# Patient Record
Sex: FEMALE | Race: OTHER | HISPANIC OR LATINO | URBAN - METROPOLITAN AREA
[De-identification: names, ages, dates, MRNs, and addresses within clinical notes are randomized per-mention and may not be internally consistent; named-entity substitution may affect disease eponyms.]

---

## 2017-07-14 ENCOUNTER — EMERGENCY (EMERGENCY)
Facility: HOSPITAL | Age: 27
LOS: 1 days | Discharge: HOME | End: 2017-07-14
Attending: EMERGENCY MEDICINE
Payer: MEDICAID

## 2017-07-14 VITALS
HEART RATE: 71 BPM | RESPIRATION RATE: 16 BRPM | SYSTOLIC BLOOD PRESSURE: 103 MMHG | TEMPERATURE: 98 F | WEIGHT: 128.97 LBS | DIASTOLIC BLOOD PRESSURE: 62 MMHG | HEIGHT: 58 IN | OXYGEN SATURATION: 99 %

## 2017-07-14 DIAGNOSIS — Z87.42 PERSONAL HISTORY OF OTHER DISEASES OF THE FEMALE GENITAL TRACT: Chronic | ICD-10-CM

## 2017-07-14 DIAGNOSIS — Z98.89 OTHER SPECIFIED POSTPROCEDURAL STATES: Chronic | ICD-10-CM

## 2017-07-14 DIAGNOSIS — N12 TUBULO-INTERSTITIAL NEPHRITIS, NOT SPECIFIED AS ACUTE OR CHRONIC: ICD-10-CM

## 2017-07-14 DIAGNOSIS — Z79.3 LONG TERM (CURRENT) USE OF HORMONAL CONTRACEPTIVES: ICD-10-CM

## 2017-07-14 DIAGNOSIS — Z91.018 ALLERGY TO OTHER FOODS: ICD-10-CM

## 2017-07-14 DIAGNOSIS — J45.909 UNSPECIFIED ASTHMA, UNCOMPLICATED: ICD-10-CM

## 2017-07-14 DIAGNOSIS — E28.2 POLYCYSTIC OVARIAN SYNDROME: ICD-10-CM

## 2017-07-14 LAB
ALBUMIN SERPL ELPH-MCNC: 3.7 G/DL — SIGNIFICANT CHANGE UP (ref 3.5–5)
ALP SERPL-CCNC: 41 U/L — SIGNIFICANT CHANGE UP (ref 40–120)
ALT FLD-CCNC: 25 U/L DA — SIGNIFICANT CHANGE UP (ref 10–60)
ANION GAP SERPL CALC-SCNC: 5 MMOL/L — SIGNIFICANT CHANGE UP (ref 5–17)
APPEARANCE UR: CLEAR — SIGNIFICANT CHANGE UP
AST SERPL-CCNC: 13 U/L — SIGNIFICANT CHANGE UP (ref 10–40)
BASOPHILS # BLD AUTO: 0.1 K/UL — SIGNIFICANT CHANGE UP (ref 0–0.2)
BASOPHILS NFR BLD AUTO: 1.1 % — SIGNIFICANT CHANGE UP (ref 0–2)
BILIRUB SERPL-MCNC: 0.4 MG/DL — SIGNIFICANT CHANGE UP (ref 0.2–1.2)
BILIRUB UR-MCNC: ABNORMAL
BUN SERPL-MCNC: 12 MG/DL — SIGNIFICANT CHANGE UP (ref 7–18)
CALCIUM SERPL-MCNC: 8.8 MG/DL — SIGNIFICANT CHANGE UP (ref 8.4–10.5)
CHLORIDE SERPL-SCNC: 107 MMOL/L — SIGNIFICANT CHANGE UP (ref 96–108)
CO2 SERPL-SCNC: 26 MMOL/L — SIGNIFICANT CHANGE UP (ref 22–31)
COLOR SPEC: YELLOW — SIGNIFICANT CHANGE UP
CREAT SERPL-MCNC: 0.63 MG/DL — SIGNIFICANT CHANGE UP (ref 0.5–1.3)
DIFF PNL FLD: ABNORMAL
EOSINOPHIL # BLD AUTO: 0.2 K/UL — SIGNIFICANT CHANGE UP (ref 0–0.5)
EOSINOPHIL NFR BLD AUTO: 2.6 % — SIGNIFICANT CHANGE UP (ref 0–6)
GLUCOSE SERPL-MCNC: 94 MG/DL — SIGNIFICANT CHANGE UP (ref 70–99)
GLUCOSE UR QL: NEGATIVE — SIGNIFICANT CHANGE UP
HCG UR QL: NEGATIVE — SIGNIFICANT CHANGE UP
HCT VFR BLD CALC: 40.6 % — SIGNIFICANT CHANGE UP (ref 34.5–45)
HGB BLD-MCNC: 13.6 G/DL — SIGNIFICANT CHANGE UP (ref 11.5–15.5)
KETONES UR-MCNC: NEGATIVE — SIGNIFICANT CHANGE UP
LEUKOCYTE ESTERASE UR-ACNC: ABNORMAL
LYMPHOCYTES # BLD AUTO: 1.4 K/UL — SIGNIFICANT CHANGE UP (ref 1–3.3)
LYMPHOCYTES # BLD AUTO: 22.7 % — SIGNIFICANT CHANGE UP (ref 13–44)
MCHC RBC-ENTMCNC: 32.9 PG — SIGNIFICANT CHANGE UP (ref 27–34)
MCHC RBC-ENTMCNC: 33.4 GM/DL — SIGNIFICANT CHANGE UP (ref 32–36)
MCV RBC AUTO: 98.5 FL — SIGNIFICANT CHANGE UP (ref 80–100)
MONOCYTES # BLD AUTO: 0.4 K/UL — SIGNIFICANT CHANGE UP (ref 0–0.9)
MONOCYTES NFR BLD AUTO: 7 % — SIGNIFICANT CHANGE UP (ref 2–14)
NEUTROPHILS # BLD AUTO: 4.2 K/UL — SIGNIFICANT CHANGE UP (ref 1.8–7.4)
NEUTROPHILS NFR BLD AUTO: 66.7 % — SIGNIFICANT CHANGE UP (ref 43–77)
NITRITE UR-MCNC: POSITIVE
PH UR: 6 — SIGNIFICANT CHANGE UP (ref 5–8)
PLATELET # BLD AUTO: 240 K/UL — SIGNIFICANT CHANGE UP (ref 150–400)
POTASSIUM SERPL-MCNC: 4.4 MMOL/L — SIGNIFICANT CHANGE UP (ref 3.5–5.3)
POTASSIUM SERPL-SCNC: 4.4 MMOL/L — SIGNIFICANT CHANGE UP (ref 3.5–5.3)
PROT SERPL-MCNC: 8 G/DL — SIGNIFICANT CHANGE UP (ref 6–8.3)
PROT UR-MCNC: 15
RBC # BLD: 4.12 M/UL — SIGNIFICANT CHANGE UP (ref 3.8–5.2)
RBC # FLD: 11.2 % — SIGNIFICANT CHANGE UP (ref 10.3–14.5)
SODIUM SERPL-SCNC: 138 MMOL/L — SIGNIFICANT CHANGE UP (ref 135–145)
SP GR SPEC: 1.01 — SIGNIFICANT CHANGE UP (ref 1.01–1.02)
UROBILINOGEN FLD QL: 4
WBC # BLD: 6.3 K/UL — SIGNIFICANT CHANGE UP (ref 3.8–10.5)
WBC # FLD AUTO: 6.3 K/UL — SIGNIFICANT CHANGE UP (ref 3.8–10.5)

## 2017-07-14 PROCEDURE — 80053 COMPREHEN METABOLIC PANEL: CPT

## 2017-07-14 PROCEDURE — 85027 COMPLETE CBC AUTOMATED: CPT

## 2017-07-14 PROCEDURE — 81001 URINALYSIS AUTO W/SCOPE: CPT

## 2017-07-14 PROCEDURE — 99284 EMERGENCY DEPT VISIT MOD MDM: CPT

## 2017-07-14 PROCEDURE — 96374 THER/PROPH/DIAG INJ IV PUSH: CPT

## 2017-07-14 PROCEDURE — 99284 EMERGENCY DEPT VISIT MOD MDM: CPT | Mod: 25

## 2017-07-14 PROCEDURE — 81025 URINE PREGNANCY TEST: CPT

## 2017-07-14 PROCEDURE — 96375 TX/PRO/DX INJ NEW DRUG ADDON: CPT

## 2017-07-14 PROCEDURE — 87086 URINE CULTURE/COLONY COUNT: CPT

## 2017-07-14 RX ORDER — SODIUM CHLORIDE 9 MG/ML
1000 INJECTION INTRAMUSCULAR; INTRAVENOUS; SUBCUTANEOUS ONCE
Qty: 0 | Refills: 0 | Status: COMPLETED | OUTPATIENT
Start: 2017-07-14 | End: 2017-07-14

## 2017-07-14 RX ORDER — CEFOXITIN 1 G/1
1 INJECTION, POWDER, FOR SOLUTION INTRAVENOUS
Qty: 20 | Refills: 0 | OUTPATIENT
Start: 2017-07-14 | End: 2017-07-24

## 2017-07-14 RX ORDER — KETOROLAC TROMETHAMINE 30 MG/ML
15 SYRINGE (ML) INJECTION ONCE
Qty: 0 | Refills: 0 | Status: DISCONTINUED | OUTPATIENT
Start: 2017-07-14 | End: 2017-07-14

## 2017-07-14 RX ORDER — CEFTRIAXONE 500 MG/1
1 INJECTION, POWDER, FOR SOLUTION INTRAMUSCULAR; INTRAVENOUS ONCE
Qty: 0 | Refills: 0 | Status: COMPLETED | OUTPATIENT
Start: 2017-07-14 | End: 2017-07-14

## 2017-07-14 RX ADMIN — Medication 15 MILLIGRAM(S): at 12:37

## 2017-07-14 RX ADMIN — Medication 15 MILLIGRAM(S): at 14:52

## 2017-07-14 RX ADMIN — CEFTRIAXONE 100 GRAM(S): 500 INJECTION, POWDER, FOR SOLUTION INTRAMUSCULAR; INTRAVENOUS at 12:37

## 2017-07-14 RX ADMIN — SODIUM CHLORIDE 1000 MILLILITER(S): 9 INJECTION INTRAMUSCULAR; INTRAVENOUS; SUBCUTANEOUS at 12:37

## 2017-07-14 NOTE — ED PROVIDER NOTE - MEDICAL DECISION MAKING DETAILS
25 y/o female c/o UTI sxs x 3 days and back pain x 1 day. Will do IV fluids, labs, UA, culture and pregnancy test, reassess.

## 2017-07-14 NOTE — ED PROVIDER NOTE - PMH
Asthma in childhood with multiple ER visits  No recent hospitalizarion. No meds  Ovarian cyst    Umbilical hernia

## 2017-07-14 NOTE — ED PROVIDER NOTE - OBJECTIVE STATEMENT
27 y/o female with a PMhx of  PCOS, on Metformin, Spironolactone and BCP, presents to ED c/o dysuria, urinary frequency and urgency x 3 days. Pt notes she was placed on Macrobid by her PMD but has had no sx relief. She is now experiencing R flank pain x last night. Pt notes she has had UTIs in the past, last was about 5 months ago, and states sxs feel similar. Pt denies any fever, chills, NVD or any other sxs at the moment.

## 2017-07-14 NOTE — ED PROVIDER NOTE - PSH
H/O hernia repair  umbilical hernia repair 2011  History of ovarian cyst  rupture- 2011. pt states she had internal bleeding and needed a transfusion.  S/P Appendectomy

## 2017-07-15 LAB
CULTURE RESULTS: NO GROWTH — SIGNIFICANT CHANGE UP
SPECIMEN SOURCE: SIGNIFICANT CHANGE UP

## 2018-01-10 ENCOUNTER — HOSPITAL ENCOUNTER (EMERGENCY)
Dept: HOSPITAL 93 - ER | Age: 28
Discharge: HOME | End: 2018-01-10
Payer: COMMERCIAL

## 2018-01-10 VITALS — BODY MASS INDEX: 25.6 KG/M2 | HEIGHT: 59 IN | WEIGHT: 127 LBS

## 2018-01-10 DIAGNOSIS — N39.0: Primary | ICD-10-CM

## 2018-01-16 ENCOUNTER — EMERGENCY (EMERGENCY)
Facility: HOSPITAL | Age: 28
LOS: 1 days | Discharge: LEFT BEFORE DISCHARGE | End: 2018-01-16
Admitting: EMERGENCY MEDICINE

## 2018-01-16 ENCOUNTER — EMERGENCY (EMERGENCY)
Facility: HOSPITAL | Age: 28
LOS: 1 days | Discharge: HOME | End: 2018-01-16
Attending: EMERGENCY MEDICINE
Payer: MEDICAID

## 2018-01-16 VITALS
SYSTOLIC BLOOD PRESSURE: 104 MMHG | RESPIRATION RATE: 16 BRPM | TEMPERATURE: 97 F | WEIGHT: 119.05 LBS | OXYGEN SATURATION: 99 % | HEIGHT: 59 IN | HEART RATE: 88 BPM | DIASTOLIC BLOOD PRESSURE: 68 MMHG

## 2018-01-16 VITALS
DIASTOLIC BLOOD PRESSURE: 68 MMHG | TEMPERATURE: 98 F | RESPIRATION RATE: 16 BRPM | SYSTOLIC BLOOD PRESSURE: 124 MMHG | HEART RATE: 91 BPM | OXYGEN SATURATION: 100 %

## 2018-01-16 DIAGNOSIS — Z87.42 PERSONAL HISTORY OF OTHER DISEASES OF THE FEMALE GENITAL TRACT: Chronic | ICD-10-CM

## 2018-01-16 DIAGNOSIS — Z98.89 OTHER SPECIFIED POSTPROCEDURAL STATES: Chronic | ICD-10-CM

## 2018-01-16 PROCEDURE — 99285 EMERGENCY DEPT VISIT HI MDM: CPT | Mod: 25

## 2018-01-16 NOTE — ED ADULT TRIAGE NOTE - CHIEF COMPLAINT QUOTE
pt c/o cramping and pressure like pelvic pain since 1pm. pt also endorses vaginal bleeding/spotting x1 hr. LMP Jan 2nd. PMH- ruptured ovarian cysts. pt states pain feels the same. PMH- Appendicitis, hernia repair x3, asthma, ovarian cysts.

## 2018-01-17 VITALS
HEART RATE: 75 BPM | SYSTOLIC BLOOD PRESSURE: 110 MMHG | RESPIRATION RATE: 16 BRPM | DIASTOLIC BLOOD PRESSURE: 70 MMHG | TEMPERATURE: 98 F | OXYGEN SATURATION: 100 %

## 2018-01-17 LAB
ALBUMIN SERPL ELPH-MCNC: 4 G/DL — SIGNIFICANT CHANGE UP (ref 3.5–5)
ALP SERPL-CCNC: 60 U/L — SIGNIFICANT CHANGE UP (ref 40–120)
ALT FLD-CCNC: 22 U/L DA — SIGNIFICANT CHANGE UP (ref 10–60)
ANION GAP SERPL CALC-SCNC: 6 MMOL/L — SIGNIFICANT CHANGE UP (ref 5–17)
APPEARANCE UR: CLEAR — SIGNIFICANT CHANGE UP
AST SERPL-CCNC: 10 U/L — SIGNIFICANT CHANGE UP (ref 10–40)
BASOPHILS # BLD AUTO: 0.1 K/UL — SIGNIFICANT CHANGE UP (ref 0–0.2)
BASOPHILS NFR BLD AUTO: 1.3 % — SIGNIFICANT CHANGE UP (ref 0–2)
BILIRUB SERPL-MCNC: 0.3 MG/DL — SIGNIFICANT CHANGE UP (ref 0.2–1.2)
BILIRUB UR-MCNC: NEGATIVE — SIGNIFICANT CHANGE UP
BUN SERPL-MCNC: 20 MG/DL — HIGH (ref 7–18)
C TRACH RRNA SPEC QL NAA+PROBE: SIGNIFICANT CHANGE UP
CALCIUM SERPL-MCNC: 8.8 MG/DL — SIGNIFICANT CHANGE UP (ref 8.4–10.5)
CHLORIDE SERPL-SCNC: 103 MMOL/L — SIGNIFICANT CHANGE UP (ref 96–108)
CO2 SERPL-SCNC: 28 MMOL/L — SIGNIFICANT CHANGE UP (ref 22–31)
COLOR SPEC: YELLOW — SIGNIFICANT CHANGE UP
CREAT SERPL-MCNC: 0.75 MG/DL — SIGNIFICANT CHANGE UP (ref 0.5–1.3)
DIFF PNL FLD: ABNORMAL
EOSINOPHIL # BLD AUTO: 0.3 K/UL — SIGNIFICANT CHANGE UP (ref 0–0.5)
EOSINOPHIL NFR BLD AUTO: 3 % — SIGNIFICANT CHANGE UP (ref 0–6)
GLUCOSE SERPL-MCNC: 98 MG/DL — SIGNIFICANT CHANGE UP (ref 70–99)
GLUCOSE UR QL: NEGATIVE — SIGNIFICANT CHANGE UP
HCG SERPL-ACNC: <1 MIU/ML — SIGNIFICANT CHANGE UP
HCG UR QL: NEGATIVE — SIGNIFICANT CHANGE UP
HCT VFR BLD CALC: 41.6 % — SIGNIFICANT CHANGE UP (ref 34.5–45)
HGB BLD-MCNC: 13.5 G/DL — SIGNIFICANT CHANGE UP (ref 11.5–15.5)
KETONES UR-MCNC: NEGATIVE — SIGNIFICANT CHANGE UP
LEUKOCYTE ESTERASE UR-ACNC: ABNORMAL
LYMPHOCYTES # BLD AUTO: 3.6 K/UL — HIGH (ref 1–3.3)
LYMPHOCYTES # BLD AUTO: 34.6 % — SIGNIFICANT CHANGE UP (ref 13–44)
MCHC RBC-ENTMCNC: 32.5 GM/DL — SIGNIFICANT CHANGE UP (ref 32–36)
MCHC RBC-ENTMCNC: 32.9 PG — SIGNIFICANT CHANGE UP (ref 27–34)
MCV RBC AUTO: 101.1 FL — HIGH (ref 80–100)
MONOCYTES # BLD AUTO: 0.9 K/UL — SIGNIFICANT CHANGE UP (ref 0–0.9)
MONOCYTES NFR BLD AUTO: 9 % — SIGNIFICANT CHANGE UP (ref 2–14)
N GONORRHOEA RRNA SPEC QL NAA+PROBE: SIGNIFICANT CHANGE UP
NEUTROPHILS # BLD AUTO: 5.4 K/UL — SIGNIFICANT CHANGE UP (ref 1.8–7.4)
NEUTROPHILS NFR BLD AUTO: 52.1 % — SIGNIFICANT CHANGE UP (ref 43–77)
NITRITE UR-MCNC: NEGATIVE — SIGNIFICANT CHANGE UP
PH UR: 5 — SIGNIFICANT CHANGE UP (ref 5–8)
PLATELET # BLD AUTO: 313 K/UL — SIGNIFICANT CHANGE UP (ref 150–400)
POTASSIUM SERPL-MCNC: 4.1 MMOL/L — SIGNIFICANT CHANGE UP (ref 3.5–5.3)
POTASSIUM SERPL-SCNC: 4.1 MMOL/L — SIGNIFICANT CHANGE UP (ref 3.5–5.3)
PROT SERPL-MCNC: 8.1 G/DL — SIGNIFICANT CHANGE UP (ref 6–8.3)
PROT UR-MCNC: 30 MG/DL
RBC # BLD: 4.12 M/UL — SIGNIFICANT CHANGE UP (ref 3.8–5.2)
RBC # FLD: 11.2 % — SIGNIFICANT CHANGE UP (ref 10.3–14.5)
SODIUM SERPL-SCNC: 137 MMOL/L — SIGNIFICANT CHANGE UP (ref 135–145)
SP GR SPEC: 1.02 — SIGNIFICANT CHANGE UP (ref 1.01–1.02)
SPECIMEN SOURCE: SIGNIFICANT CHANGE UP
UROBILINOGEN FLD QL: NEGATIVE — SIGNIFICANT CHANGE UP
WBC # BLD: 10.3 K/UL — SIGNIFICANT CHANGE UP (ref 3.8–10.5)
WBC # FLD AUTO: 10.3 K/UL — SIGNIFICANT CHANGE UP (ref 3.8–10.5)

## 2018-01-17 PROCEDURE — 76830 TRANSVAGINAL US NON-OB: CPT

## 2018-01-17 PROCEDURE — 85027 COMPLETE CBC AUTOMATED: CPT

## 2018-01-17 PROCEDURE — 76830 TRANSVAGINAL US NON-OB: CPT | Mod: 26,59

## 2018-01-17 PROCEDURE — 86850 RBC ANTIBODY SCREEN: CPT

## 2018-01-17 PROCEDURE — 96375 TX/PRO/DX INJ NEW DRUG ADDON: CPT

## 2018-01-17 PROCEDURE — 84702 CHORIONIC GONADOTROPIN TEST: CPT

## 2018-01-17 PROCEDURE — 87186 SC STD MICRODIL/AGAR DIL: CPT

## 2018-01-17 PROCEDURE — 81001 URINALYSIS AUTO W/SCOPE: CPT

## 2018-01-17 PROCEDURE — 86901 BLOOD TYPING SEROLOGIC RH(D): CPT

## 2018-01-17 PROCEDURE — 76856 US EXAM PELVIC COMPLETE: CPT | Mod: 26,59

## 2018-01-17 PROCEDURE — 76856 US EXAM PELVIC COMPLETE: CPT

## 2018-01-17 PROCEDURE — 99284 EMERGENCY DEPT VISIT MOD MDM: CPT | Mod: 25

## 2018-01-17 PROCEDURE — 87086 URINE CULTURE/COLONY COUNT: CPT

## 2018-01-17 PROCEDURE — 86900 BLOOD TYPING SEROLOGIC ABO: CPT

## 2018-01-17 PROCEDURE — 96374 THER/PROPH/DIAG INJ IV PUSH: CPT

## 2018-01-17 PROCEDURE — 81025 URINE PREGNANCY TEST: CPT

## 2018-01-17 PROCEDURE — 80053 COMPREHEN METABOLIC PANEL: CPT

## 2018-01-17 RX ORDER — KETOROLAC TROMETHAMINE 30 MG/ML
30 SYRINGE (ML) INJECTION ONCE
Qty: 0 | Refills: 0 | Status: DISCONTINUED | OUTPATIENT
Start: 2018-01-17 | End: 2018-01-17

## 2018-01-17 RX ORDER — SODIUM CHLORIDE 9 MG/ML
3 INJECTION INTRAMUSCULAR; INTRAVENOUS; SUBCUTANEOUS ONCE
Qty: 0 | Refills: 0 | Status: COMPLETED | OUTPATIENT
Start: 2018-01-17 | End: 2018-01-17

## 2018-01-17 RX ORDER — MORPHINE SULFATE 50 MG/1
2 CAPSULE, EXTENDED RELEASE ORAL ONCE
Qty: 0 | Refills: 0 | Status: DISCONTINUED | OUTPATIENT
Start: 2018-01-17 | End: 2018-01-17

## 2018-01-17 RX ADMIN — Medication 1 TABLET(S): at 04:15

## 2018-01-17 RX ADMIN — MORPHINE SULFATE 2 MILLIGRAM(S): 50 CAPSULE, EXTENDED RELEASE ORAL at 04:01

## 2018-01-17 RX ADMIN — SODIUM CHLORIDE 3 MILLILITER(S): 9 INJECTION INTRAMUSCULAR; INTRAVENOUS; SUBCUTANEOUS at 01:39

## 2018-01-17 RX ADMIN — MORPHINE SULFATE 2 MILLIGRAM(S): 50 CAPSULE, EXTENDED RELEASE ORAL at 01:46

## 2018-01-17 RX ADMIN — Medication 30 MILLIGRAM(S): at 04:15

## 2018-01-17 RX ADMIN — Medication 30 MILLIGRAM(S): at 04:35

## 2018-01-17 NOTE — ED PROVIDER NOTE - MEDICAL DECISION MAKING DETAILS
Pt with PCOS with pelvic pain and vaginal spotting x 1 day. Will get labs, UA, pelvic ultrasound to r/u torsion. Given morphine for pain and reassess.

## 2018-01-17 NOTE — ED PROVIDER NOTE - OBJECTIVE STATEMENT
26 y/o female with PMHx of PCOS and PSHx of umbilical hernia repair, appendectomy presents to the ED c/o pelvic pain x 1 day. Pt notes the pain began around 1PM today with associated vaginal spotting and dysuria. Pt notes the pain started in the pelvic area and radiated up the abd. Pt denies N/V/D, fever, or any other complaints. Pt allergic to Reglan (rash).

## 2018-01-17 NOTE — ED PROVIDER NOTE - PROGRESS NOTE DETAILS
labs show wbc 10K, UA cw UTI-given bactrim  pelvic US shows R ovarian cyst-no evidence of free fluid or torsion. Discussed above with patient. patient stable for discharge.

## 2018-02-24 ENCOUNTER — EMERGENCY (EMERGENCY)
Facility: HOSPITAL | Age: 28
LOS: 1 days | Discharge: HOME | End: 2018-02-24
Attending: EMERGENCY MEDICINE | Admitting: EMERGENCY MEDICINE
Payer: MEDICAID

## 2018-02-24 VITALS
SYSTOLIC BLOOD PRESSURE: 93 MMHG | DIASTOLIC BLOOD PRESSURE: 56 MMHG | RESPIRATION RATE: 16 BRPM | OXYGEN SATURATION: 99 % | TEMPERATURE: 99 F | HEART RATE: 78 BPM

## 2018-02-24 VITALS
HEART RATE: 74 BPM | OXYGEN SATURATION: 100 % | DIASTOLIC BLOOD PRESSURE: 53 MMHG | SYSTOLIC BLOOD PRESSURE: 101 MMHG | TEMPERATURE: 98 F | RESPIRATION RATE: 18 BRPM

## 2018-02-24 DIAGNOSIS — Z87.42 PERSONAL HISTORY OF OTHER DISEASES OF THE FEMALE GENITAL TRACT: Chronic | ICD-10-CM

## 2018-02-24 DIAGNOSIS — Z98.89 OTHER SPECIFIED POSTPROCEDURAL STATES: Chronic | ICD-10-CM

## 2018-02-24 LAB
ALBUMIN SERPL ELPH-MCNC: 3.8 G/DL — SIGNIFICANT CHANGE UP (ref 3.3–5)
ALP SERPL-CCNC: 34 U/L — LOW (ref 40–120)
ALT FLD-CCNC: 10 U/L — SIGNIFICANT CHANGE UP (ref 4–33)
APPEARANCE UR: CLEAR — SIGNIFICANT CHANGE UP
AST SERPL-CCNC: 12 U/L — SIGNIFICANT CHANGE UP (ref 4–32)
BACTERIA # UR AUTO: SIGNIFICANT CHANGE UP
BASOPHILS # BLD AUTO: 0.05 K/UL — SIGNIFICANT CHANGE UP (ref 0–0.2)
BASOPHILS NFR BLD AUTO: 0.5 % — SIGNIFICANT CHANGE UP (ref 0–2)
BILIRUB SERPL-MCNC: 0.3 MG/DL — SIGNIFICANT CHANGE UP (ref 0.2–1.2)
BILIRUB UR-MCNC: NEGATIVE — SIGNIFICANT CHANGE UP
BLD GP AB SCN SERPL QL: NEGATIVE — SIGNIFICANT CHANGE UP
BLOOD UR QL VISUAL: HIGH
BUN SERPL-MCNC: 11 MG/DL — SIGNIFICANT CHANGE UP (ref 7–23)
CALCIUM SERPL-MCNC: 8.7 MG/DL — SIGNIFICANT CHANGE UP (ref 8.4–10.5)
CHLORIDE SERPL-SCNC: 102 MMOL/L — SIGNIFICANT CHANGE UP (ref 98–107)
CO2 SERPL-SCNC: 23 MMOL/L — SIGNIFICANT CHANGE UP (ref 22–31)
COLOR SPEC: YELLOW — SIGNIFICANT CHANGE UP
CREAT SERPL-MCNC: 0.61 MG/DL — SIGNIFICANT CHANGE UP (ref 0.5–1.3)
EOSINOPHIL # BLD AUTO: 0.14 K/UL — SIGNIFICANT CHANGE UP (ref 0–0.5)
EOSINOPHIL NFR BLD AUTO: 1.3 % — SIGNIFICANT CHANGE UP (ref 0–6)
GLUCOSE SERPL-MCNC: 92 MG/DL — SIGNIFICANT CHANGE UP (ref 70–99)
GLUCOSE UR-MCNC: NEGATIVE — SIGNIFICANT CHANGE UP
HCG SERPL-ACNC: SIGNIFICANT CHANGE UP MIU/ML
HCT VFR BLD CALC: 35.4 % — SIGNIFICANT CHANGE UP (ref 34.5–45)
HGB BLD-MCNC: 12.2 G/DL — SIGNIFICANT CHANGE UP (ref 11.5–15.5)
IMM GRANULOCYTES # BLD AUTO: 0.05 # — SIGNIFICANT CHANGE UP
IMM GRANULOCYTES NFR BLD AUTO: 0.5 % — SIGNIFICANT CHANGE UP (ref 0–1.5)
KETONES UR-MCNC: NEGATIVE — SIGNIFICANT CHANGE UP
LEUKOCYTE ESTERASE UR-ACNC: HIGH
LYMPHOCYTES # BLD AUTO: 2.52 K/UL — SIGNIFICANT CHANGE UP (ref 1–3.3)
LYMPHOCYTES # BLD AUTO: 24.2 % — SIGNIFICANT CHANGE UP (ref 13–44)
MCHC RBC-ENTMCNC: 34.4 PG — HIGH (ref 27–34)
MCHC RBC-ENTMCNC: 34.5 % — SIGNIFICANT CHANGE UP (ref 32–36)
MCV RBC AUTO: 99.7 FL — SIGNIFICANT CHANGE UP (ref 80–100)
MONOCYTES # BLD AUTO: 0.88 K/UL — SIGNIFICANT CHANGE UP (ref 0–0.9)
MONOCYTES NFR BLD AUTO: 8.4 % — SIGNIFICANT CHANGE UP (ref 2–14)
MUCOUS THREADS # UR AUTO: SIGNIFICANT CHANGE UP
NEUTROPHILS # BLD AUTO: 6.78 K/UL — SIGNIFICANT CHANGE UP (ref 1.8–7.4)
NEUTROPHILS NFR BLD AUTO: 65.1 % — SIGNIFICANT CHANGE UP (ref 43–77)
NITRITE UR-MCNC: NEGATIVE — SIGNIFICANT CHANGE UP
NRBC # FLD: 0 — SIGNIFICANT CHANGE UP
PH UR: 5.5 — SIGNIFICANT CHANGE UP (ref 4.6–8)
PLATELET # BLD AUTO: 228 K/UL — SIGNIFICANT CHANGE UP (ref 150–400)
PMV BLD: 10.6 FL — SIGNIFICANT CHANGE UP (ref 7–13)
POTASSIUM SERPL-MCNC: 4.4 MMOL/L — SIGNIFICANT CHANGE UP (ref 3.5–5.3)
POTASSIUM SERPL-SCNC: 4.4 MMOL/L — SIGNIFICANT CHANGE UP (ref 3.5–5.3)
PROT SERPL-MCNC: 6.7 G/DL — SIGNIFICANT CHANGE UP (ref 6–8.3)
PROT UR-MCNC: NEGATIVE MG/DL — SIGNIFICANT CHANGE UP
RBC # BLD: 3.55 M/UL — LOW (ref 3.8–5.2)
RBC # FLD: 11.8 % — SIGNIFICANT CHANGE UP (ref 10.3–14.5)
RBC CASTS # UR COMP ASSIST: SIGNIFICANT CHANGE UP (ref 0–?)
RH IG SCN BLD-IMP: POSITIVE — SIGNIFICANT CHANGE UP
SODIUM SERPL-SCNC: 138 MMOL/L — SIGNIFICANT CHANGE UP (ref 135–145)
SP GR SPEC: 1.02 — SIGNIFICANT CHANGE UP (ref 1–1.04)
SQUAMOUS # UR AUTO: SIGNIFICANT CHANGE UP
UROBILINOGEN FLD QL: NORMAL MG/DL — SIGNIFICANT CHANGE UP
WBC # BLD: 10.42 K/UL — SIGNIFICANT CHANGE UP (ref 3.8–10.5)
WBC # FLD AUTO: 10.42 K/UL — SIGNIFICANT CHANGE UP (ref 3.8–10.5)
WBC UR QL: SIGNIFICANT CHANGE UP (ref 0–?)

## 2018-02-24 PROCEDURE — 76830 TRANSVAGINAL US NON-OB: CPT | Mod: 26

## 2018-02-24 PROCEDURE — 99285 EMERGENCY DEPT VISIT HI MDM: CPT | Mod: 25

## 2018-02-24 NOTE — ED PROVIDER NOTE - MEDICAL DECISION MAKING DETAILS
28yo female 7 weeks gestation with vaginal bleeding, LLQ pain, will check TVUS, labs, offered Tylenol but declined, reassess. Ashley Doherty DO

## 2018-02-24 NOTE — ED ADULT NURSE NOTE - CHIEF COMPLAINT
The patient is a 27y Female complaining of vaginal bleeding when wiping since this morning during flight back from [place], reports x7 weeks pregnant, 2nd pregnancy, last pregnancy resulted in spontaneous . Pt also c/o slight abdominal cramping. Denies any clots, denies saturating pads, denies dizziness/lightheadedness. Reports pmhx ovarian cyst rupture, asthma, umbilical hernia.

## 2018-02-24 NOTE — ED ADULT NURSE NOTE - OBJECTIVE STATEMENT
Received pt in room 20, ambulatory, pt A&Ox4, respirations even and unlabored b/l. Abdomen soft, nondistended, nontender. Mother at bedside. IVL 20g Angiocath placed on right AC. Labs sent. Awaiting MD barakat. Report endorsed to primary RN.

## 2018-02-24 NOTE — ED PROVIDER NOTE - OBJECTIVE STATEMENT
28yo female PMH ovarian cyst ,  ~7 weeks gestation (LMP 18) presenting with vaginal bleeding x 1 day. Patient flew home from [place] earlier today and noticed pink discharge after she landed. Later this evening she noticed dark red blood only while wiping, none in underwear. Patient states she has mild LLQ pain. No dysuria or hematuria, no nausea or vomiting, no back pain. Patient had a miscarriage prior to this pregnancy around the same time. Patient states that she had a visualized IUP earlier this week in [place].     OB/GYN: Dr. Tovar ([place]) 26yo female PMH ovarian cyst ,  ~7 weeks gestation (LMP 18) presenting with vaginal bleeding x 1 day. Patient flew home from [place] earlier today and noticed pink discharge after she landed. Later this evening she noticed dark red blood only while wiping, none in underwear. Patient states she has mild LLQ pain. No dysuria or hematuria, no nausea or vomiting, no back pain. Patient had a miscarriage prior to this pregnancy around the same time. Patient states that she had a visualized IUP earlier this week in [place]. This pregnancy was conceived naturally without aid of fertility agents, desired pregnancy.    OB/GYN: Dr. Tovar ([place])

## 2018-02-24 NOTE — ED PROVIDER NOTE - PHYSICAL EXAMINATION
Gen: NAD  Head: NCAT  Lung: CTAB, no respiratory distress, no wheezing, rales, rhonchi  CV: normal s1/s2, rrr, no murmurs, Normal perfusion  Abd: soft, mild TTP LLQ, no guarding or rigidity  MSK: No edema, no visible deformities, full range of motion in all 4 extremities  Neuro: CN II-XII grossly intact, No focal neurologic deficits  Skin: No rash   Psych: normal affect Gen: NAD  Head: NCAT  Lung: CTAB, no respiratory distress, no wheezing, rales, rhonchi  CV: normal s1/s2, rrr, no murmurs, Normal perfusion  Abd: soft, mild TTP LLQ, no guarding or rigidity  MSK: No edema, no visible deformities, full range of motion in all 4 extremities  Neuro: CN II-XII grossly intact, No focal neurologic deficits  Skin: No rash   Psych: normal affect  Pelvic: (Chaperoned by Dr. Cruz) Cervical os open, +left adnexal tenderness

## 2018-02-24 NOTE — ED ADULT NURSE NOTE - CHIEF COMPLAINT QUOTE
7 weeks pregnant and started having vaginal spotting around 4pm Yesterday but this AM she started to have more bleeding. c/o lower abdominal discomfort. LMP :1/02/18

## 2018-02-25 LAB
BACTERIA UR CULT: SIGNIFICANT CHANGE UP
SPECIMEN SOURCE: SIGNIFICANT CHANGE UP

## 2018-04-03 ENCOUNTER — EMERGENCY (EMERGENCY)
Facility: HOSPITAL | Age: 28
LOS: 1 days | Discharge: HOME | End: 2018-04-03
Attending: EMERGENCY MEDICINE
Payer: MEDICAID

## 2018-04-03 VITALS
TEMPERATURE: 98 F | HEIGHT: 59 IN | RESPIRATION RATE: 16 BRPM | HEART RATE: 76 BPM | SYSTOLIC BLOOD PRESSURE: 103 MMHG | OXYGEN SATURATION: 98 % | WEIGHT: 130.07 LBS | DIASTOLIC BLOOD PRESSURE: 78 MMHG

## 2018-04-03 DIAGNOSIS — Z87.42 PERSONAL HISTORY OF OTHER DISEASES OF THE FEMALE GENITAL TRACT: Chronic | ICD-10-CM

## 2018-04-03 DIAGNOSIS — Z98.89 OTHER SPECIFIED POSTPROCEDURAL STATES: Chronic | ICD-10-CM

## 2018-04-03 LAB
APPEARANCE UR: CLEAR — SIGNIFICANT CHANGE UP
BILIRUB UR-MCNC: NEGATIVE — SIGNIFICANT CHANGE UP
COLOR SPEC: YELLOW — SIGNIFICANT CHANGE UP
DIFF PNL FLD: NEGATIVE — SIGNIFICANT CHANGE UP
GLUCOSE UR QL: NEGATIVE — SIGNIFICANT CHANGE UP
KETONES UR-MCNC: NEGATIVE — SIGNIFICANT CHANGE UP
LEUKOCYTE ESTERASE UR-ACNC: ABNORMAL
NITRITE UR-MCNC: NEGATIVE — SIGNIFICANT CHANGE UP
PH UR: 8 — SIGNIFICANT CHANGE UP (ref 5–8)
PROT UR-MCNC: NEGATIVE — SIGNIFICANT CHANGE UP
SP GR SPEC: 1.01 — SIGNIFICANT CHANGE UP (ref 1.01–1.02)
UROBILINOGEN FLD QL: NEGATIVE — SIGNIFICANT CHANGE UP

## 2018-04-03 PROCEDURE — 99285 EMERGENCY DEPT VISIT HI MDM: CPT

## 2018-04-03 PROCEDURE — 76801 OB US < 14 WKS SINGLE FETUS: CPT

## 2018-04-03 PROCEDURE — 76817 TRANSVAGINAL US OBSTETRIC: CPT

## 2018-04-03 PROCEDURE — 81001 URINALYSIS AUTO W/SCOPE: CPT

## 2018-04-03 PROCEDURE — 99284 EMERGENCY DEPT VISIT MOD MDM: CPT | Mod: 25

## 2018-04-03 PROCEDURE — 76801 OB US < 14 WKS SINGLE FETUS: CPT | Mod: 26

## 2018-04-03 PROCEDURE — 76817 TRANSVAGINAL US OBSTETRIC: CPT | Mod: 26

## 2018-04-03 NOTE — ED PROVIDER NOTE - PMH
Asthma in childhood with multiple ER visits  No recent hospitalizarion. No meds  Endometriosis    Ovarian cyst    Peritoneal adhesions    Umbilical hernia

## 2018-04-03 NOTE — ED ADULT NURSE NOTE - OBJECTIVE STATEMENT
Patient presents to ED with severe pelvic cramping since around 5am today, pressure, patient is about 13 weeks pregnant. Denies any nausea or vomiting

## 2018-04-03 NOTE — ED PROVIDER NOTE - MEDICAL DECISION MAKING DETAILS
Patient with second trimester abdominal pain. Threatened . To f/u with gyn in 1-2 days. Bleeding precautions, pelvic rest given.

## 2018-04-03 NOTE — ED PROVIDER NOTE - OBJECTIVE STATEMENT
Patient reports severe pelvic cramping since around 5am today, pressure. No fever, ha, cp, sob,  n/v/d, dysuria, urgency, frequency, vaginal bleeding, vaginal discharge. Patient is approximately 13 weeks pregnant. Has h/o endometriosis with intraabdominal adhesions.

## 2018-08-28 NOTE — ED ADULT TRIAGE NOTE - CHIEF COMPLAINT QUOTE
7 weeks pregnant and started having vaginal spotting around 4pm Yesterday but this AM she started to have more bleeding. c/o lower abdominal discomfort. LMP )1/02/18 7 weeks pregnant and started having vaginal spotting around 4pm Yesterday but this AM she started to have more bleeding. c/o lower abdominal discomfort. LMP :1/02/18 good, to achieve stated therapy goals

## 2018-09-16 ENCOUNTER — OUTPATIENT (OUTPATIENT)
Dept: OUTPATIENT SERVICES | Facility: HOSPITAL | Age: 28
LOS: 1 days | End: 2018-09-16

## 2018-09-16 DIAGNOSIS — Z98.89 OTHER SPECIFIED POSTPROCEDURAL STATES: Chronic | ICD-10-CM

## 2018-09-16 DIAGNOSIS — O26.899 OTHER SPECIFIED PREGNANCY RELATED CONDITIONS, UNSPECIFIED TRIMESTER: ICD-10-CM

## 2018-09-16 DIAGNOSIS — Z87.42 PERSONAL HISTORY OF OTHER DISEASES OF THE FEMALE GENITAL TRACT: Chronic | ICD-10-CM

## 2018-09-16 DIAGNOSIS — Z3A.00 WEEKS OF GESTATION OF PREGNANCY NOT SPECIFIED: ICD-10-CM

## 2018-09-16 PROBLEM — N80.9 ENDOMETRIOSIS, UNSPECIFIED: Chronic | Status: ACTIVE | Noted: 2018-04-03

## 2018-10-01 ENCOUNTER — OUTPATIENT (OUTPATIENT)
Dept: OUTPATIENT SERVICES | Facility: HOSPITAL | Age: 28
LOS: 1 days | End: 2018-10-01
Payer: MEDICAID

## 2018-10-01 DIAGNOSIS — Z98.89 OTHER SPECIFIED POSTPROCEDURAL STATES: Chronic | ICD-10-CM

## 2018-10-01 DIAGNOSIS — Z87.42 PERSONAL HISTORY OF OTHER DISEASES OF THE FEMALE GENITAL TRACT: Chronic | ICD-10-CM

## 2018-10-07 ENCOUNTER — INPATIENT (INPATIENT)
Facility: HOSPITAL | Age: 28
LOS: 1 days | Discharge: HOME | End: 2018-10-09
Attending: OBSTETRICS & GYNECOLOGY | Admitting: OBSTETRICS & GYNECOLOGY
Payer: MEDICAID

## 2018-10-07 VITALS — HEIGHT: 59 IN | WEIGHT: 160.94 LBS

## 2018-10-07 DIAGNOSIS — Z87.42 PERSONAL HISTORY OF OTHER DISEASES OF THE FEMALE GENITAL TRACT: Chronic | ICD-10-CM

## 2018-10-07 DIAGNOSIS — Z3A.00 WEEKS OF GESTATION OF PREGNANCY NOT SPECIFIED: ICD-10-CM

## 2018-10-07 DIAGNOSIS — Z98.89 OTHER SPECIFIED POSTPROCEDURAL STATES: Chronic | ICD-10-CM

## 2018-10-07 DIAGNOSIS — O26.899 OTHER SPECIFIED PREGNANCY RELATED CONDITIONS, UNSPECIFIED TRIMESTER: ICD-10-CM

## 2018-10-07 LAB
BASOPHILS # BLD AUTO: 0.05 K/UL — SIGNIFICANT CHANGE UP (ref 0–0.2)
BASOPHILS NFR BLD AUTO: 0.5 % — SIGNIFICANT CHANGE UP (ref 0–2)
BLD GP AB SCN SERPL QL: NEGATIVE — SIGNIFICANT CHANGE UP
EOSINOPHIL # BLD AUTO: 0.12 K/UL — SIGNIFICANT CHANGE UP (ref 0–0.5)
EOSINOPHIL NFR BLD AUTO: 1.3 % — SIGNIFICANT CHANGE UP (ref 0–6)
HBV SURFACE AG SER-ACNC: NEGATIVE — SIGNIFICANT CHANGE UP
HCT VFR BLD CALC: 37.2 % — SIGNIFICANT CHANGE UP (ref 34.5–45)
HGB BLD-MCNC: 12.4 G/DL — SIGNIFICANT CHANGE UP (ref 11.5–15.5)
HIV COMBO RESULT: SIGNIFICANT CHANGE UP
HIV1+2 AB SPEC QL: SIGNIFICANT CHANGE UP
IMM GRANULOCYTES # BLD AUTO: 0.06 # — SIGNIFICANT CHANGE UP
IMM GRANULOCYTES NFR BLD AUTO: 0.7 % — SIGNIFICANT CHANGE UP (ref 0–1.5)
LYMPHOCYTES # BLD AUTO: 2.53 K/UL — SIGNIFICANT CHANGE UP (ref 1–3.3)
LYMPHOCYTES # BLD AUTO: 27.6 % — SIGNIFICANT CHANGE UP (ref 13–44)
MCHC RBC-ENTMCNC: 33.3 % — SIGNIFICANT CHANGE UP (ref 32–36)
MCHC RBC-ENTMCNC: 33.4 PG — SIGNIFICANT CHANGE UP (ref 27–34)
MCV RBC AUTO: 100.3 FL — HIGH (ref 80–100)
MONOCYTES # BLD AUTO: 0.87 K/UL — SIGNIFICANT CHANGE UP (ref 0–0.9)
MONOCYTES NFR BLD AUTO: 9.5 % — SIGNIFICANT CHANGE UP (ref 2–14)
NEUTROPHILS # BLD AUTO: 5.55 K/UL — SIGNIFICANT CHANGE UP (ref 1.8–7.4)
NEUTROPHILS NFR BLD AUTO: 60.4 % — SIGNIFICANT CHANGE UP (ref 43–77)
NRBC # FLD: 0 — SIGNIFICANT CHANGE UP
PLATELET # BLD AUTO: 205 K/UL — SIGNIFICANT CHANGE UP (ref 150–400)
PMV BLD: 11.8 FL — SIGNIFICANT CHANGE UP (ref 7–13)
RBC # BLD: 3.71 M/UL — LOW (ref 3.8–5.2)
RBC # FLD: 12.7 % — SIGNIFICANT CHANGE UP (ref 10.3–14.5)
RH IG SCN BLD-IMP: POSITIVE — SIGNIFICANT CHANGE UP
WBC # BLD: 9.18 K/UL — SIGNIFICANT CHANGE UP (ref 3.8–10.5)
WBC # FLD AUTO: 9.18 K/UL — SIGNIFICANT CHANGE UP (ref 3.8–10.5)

## 2018-10-07 PROCEDURE — 59409 OBSTETRICAL CARE: CPT | Mod: U9,UB,GC

## 2018-10-07 RX ORDER — OXYTOCIN 10 UNIT/ML
41.67 VIAL (ML) INJECTION
Qty: 20 | Refills: 0 | Status: DISCONTINUED | OUTPATIENT
Start: 2018-10-07 | End: 2018-10-08

## 2018-10-07 RX ORDER — PRAMOXINE HYDROCHLORIDE 150 MG/15G
1 AEROSOL, FOAM RECTAL EVERY 4 HOURS
Qty: 0 | Refills: 0 | Status: DISCONTINUED | OUTPATIENT
Start: 2018-10-07 | End: 2018-10-09

## 2018-10-07 RX ORDER — MAGNESIUM HYDROXIDE 400 MG/1
30 TABLET, CHEWABLE ORAL
Qty: 0 | Refills: 0 | Status: DISCONTINUED | OUTPATIENT
Start: 2018-10-07 | End: 2018-10-09

## 2018-10-07 RX ORDER — DIPHENHYDRAMINE HCL 50 MG
25 CAPSULE ORAL EVERY 6 HOURS
Qty: 0 | Refills: 0 | Status: DISCONTINUED | OUTPATIENT
Start: 2018-10-07 | End: 2018-10-09

## 2018-10-07 RX ORDER — OXYCODONE HYDROCHLORIDE 5 MG/1
5 TABLET ORAL
Qty: 0 | Refills: 0 | Status: DISCONTINUED | OUTPATIENT
Start: 2018-10-07 | End: 2018-10-09

## 2018-10-07 RX ORDER — ACETAMINOPHEN 500 MG
975 TABLET ORAL EVERY 6 HOURS
Qty: 0 | Refills: 0 | Status: DISCONTINUED | OUTPATIENT
Start: 2018-10-07 | End: 2018-10-09

## 2018-10-07 RX ORDER — OXYTOCIN 10 UNIT/ML
333.33 VIAL (ML) INJECTION
Qty: 20 | Refills: 0 | Status: COMPLETED | OUTPATIENT
Start: 2018-10-07

## 2018-10-07 RX ORDER — SODIUM CHLORIDE 9 MG/ML
3 INJECTION INTRAMUSCULAR; INTRAVENOUS; SUBCUTANEOUS EVERY 8 HOURS
Qty: 0 | Refills: 0 | Status: DISCONTINUED | OUTPATIENT
Start: 2018-10-07 | End: 2018-10-09

## 2018-10-07 RX ORDER — OXYTOCIN 10 UNIT/ML
2 VIAL (ML) INJECTION
Qty: 30 | Refills: 0 | Status: DISCONTINUED | OUTPATIENT
Start: 2018-10-07 | End: 2018-10-07

## 2018-10-07 RX ORDER — DOCUSATE SODIUM 100 MG
100 CAPSULE ORAL
Qty: 0 | Refills: 0 | Status: DISCONTINUED | OUTPATIENT
Start: 2018-10-07 | End: 2018-10-09

## 2018-10-07 RX ORDER — SIMETHICONE 80 MG/1
80 TABLET, CHEWABLE ORAL EVERY 6 HOURS
Qty: 0 | Refills: 0 | Status: DISCONTINUED | OUTPATIENT
Start: 2018-10-07 | End: 2018-10-09

## 2018-10-07 RX ORDER — SODIUM CHLORIDE 9 MG/ML
1000 INJECTION, SOLUTION INTRAVENOUS ONCE
Qty: 0 | Refills: 0 | Status: COMPLETED | OUTPATIENT
Start: 2018-10-07 | End: 2018-10-07

## 2018-10-07 RX ORDER — OXYTOCIN 10 UNIT/ML
333.33 VIAL (ML) INJECTION
Qty: 20 | Refills: 0 | Status: DISCONTINUED | OUTPATIENT
Start: 2018-10-07 | End: 2018-10-07

## 2018-10-07 RX ORDER — IBUPROFEN 200 MG
600 TABLET ORAL EVERY 6 HOURS
Qty: 0 | Refills: 0 | Status: COMPLETED | OUTPATIENT
Start: 2018-10-07 | End: 2019-09-05

## 2018-10-07 RX ORDER — LANOLIN
1 OINTMENT (GRAM) TOPICAL EVERY 6 HOURS
Qty: 0 | Refills: 0 | Status: DISCONTINUED | OUTPATIENT
Start: 2018-10-07 | End: 2018-10-09

## 2018-10-07 RX ORDER — HYDROCORTISONE 1 %
1 OINTMENT (GRAM) TOPICAL EVERY 4 HOURS
Qty: 0 | Refills: 0 | Status: DISCONTINUED | OUTPATIENT
Start: 2018-10-07 | End: 2018-10-09

## 2018-10-07 RX ORDER — ACETAMINOPHEN 500 MG
975 TABLET ORAL EVERY 6 HOURS
Qty: 0 | Refills: 0 | Status: COMPLETED | OUTPATIENT
Start: 2018-10-07 | End: 2019-09-05

## 2018-10-07 RX ORDER — OXYCODONE HYDROCHLORIDE 5 MG/1
5 TABLET ORAL EVERY 4 HOURS
Qty: 0 | Refills: 0 | Status: DISCONTINUED | OUTPATIENT
Start: 2018-10-07 | End: 2018-10-09

## 2018-10-07 RX ORDER — AER TRAVELER 0.5 G/1
1 SOLUTION RECTAL; TOPICAL EVERY 4 HOURS
Qty: 0 | Refills: 0 | Status: DISCONTINUED | OUTPATIENT
Start: 2018-10-07 | End: 2018-10-09

## 2018-10-07 RX ORDER — SODIUM CHLORIDE 9 MG/ML
1000 INJECTION, SOLUTION INTRAVENOUS
Qty: 0 | Refills: 0 | Status: DISCONTINUED | OUTPATIENT
Start: 2018-10-07 | End: 2018-10-07

## 2018-10-07 RX ORDER — HYDROCORTISONE 1 %
1 OINTMENT (GRAM) TOPICAL EVERY 4 HOURS
Qty: 0 | Refills: 0 | Status: DISCONTINUED | OUTPATIENT
Start: 2018-10-07 | End: 2018-10-07

## 2018-10-07 RX ORDER — TETANUS TOXOID, REDUCED DIPHTHERIA TOXOID AND ACELLULAR PERTUSSIS VACCINE, ADSORBED 5; 2.5; 8; 8; 2.5 [IU]/.5ML; [IU]/.5ML; UG/.5ML; UG/.5ML; UG/.5ML
0.5 SUSPENSION INTRAMUSCULAR ONCE
Qty: 0 | Refills: 0 | Status: COMPLETED | OUTPATIENT
Start: 2018-10-07 | End: 2019-09-05

## 2018-10-07 RX ORDER — PRAMOXINE HYDROCHLORIDE 150 MG/15G
1 AEROSOL, FOAM RECTAL EVERY 4 HOURS
Qty: 0 | Refills: 0 | Status: DISCONTINUED | OUTPATIENT
Start: 2018-10-07 | End: 2018-10-07

## 2018-10-07 RX ORDER — INFLUENZA VIRUS VACCINE 15; 15; 15; 15 UG/.5ML; UG/.5ML; UG/.5ML; UG/.5ML
0.5 SUSPENSION INTRAMUSCULAR ONCE
Qty: 0 | Refills: 0 | Status: COMPLETED | OUTPATIENT
Start: 2018-10-07 | End: 2018-10-07

## 2018-10-07 RX ORDER — DIBUCAINE 1 %
1 OINTMENT (GRAM) RECTAL EVERY 4 HOURS
Qty: 0 | Refills: 0 | Status: DISCONTINUED | OUTPATIENT
Start: 2018-10-07 | End: 2018-10-09

## 2018-10-07 RX ORDER — KETOROLAC TROMETHAMINE 30 MG/ML
30 SYRINGE (ML) INJECTION ONCE
Qty: 0 | Refills: 0 | Status: DISCONTINUED | OUTPATIENT
Start: 2018-10-07 | End: 2018-10-07

## 2018-10-07 RX ORDER — GLYCERIN ADULT
1 SUPPOSITORY, RECTAL RECTAL AT BEDTIME
Qty: 0 | Refills: 0 | Status: DISCONTINUED | OUTPATIENT
Start: 2018-10-07 | End: 2018-10-09

## 2018-10-07 RX ADMIN — Medication 30 MILLIGRAM(S): at 23:15

## 2018-10-07 RX ADMIN — Medication 2 MILLIUNIT(S)/MIN: at 13:34

## 2018-10-07 RX ADMIN — SODIUM CHLORIDE 3 MILLILITER(S): 9 INJECTION INTRAMUSCULAR; INTRAVENOUS; SUBCUTANEOUS at 22:44

## 2018-10-07 RX ADMIN — Medication 1 APPLICATION(S): at 22:41

## 2018-10-07 RX ADMIN — SODIUM CHLORIDE 2000 MILLILITER(S): 9 INJECTION, SOLUTION INTRAVENOUS at 04:26

## 2018-10-07 RX ADMIN — Medication 1 APPLICATION(S): at 22:42

## 2018-10-07 RX ADMIN — AER TRAVELER 1 APPLICATION(S): 0.5 SOLUTION RECTAL; TOPICAL at 22:41

## 2018-10-07 RX ADMIN — Medication 125 MILLIUNIT(S)/MIN: at 21:06

## 2018-10-07 RX ADMIN — Medication 30 MILLIGRAM(S): at 22:46

## 2018-10-07 RX ADMIN — PRAMOXINE HYDROCHLORIDE 1 APPLICATION(S): 150 AEROSOL, FOAM RECTAL at 22:41

## 2018-10-08 ENCOUNTER — TRANSCRIPTION ENCOUNTER (OUTPATIENT)
Age: 28
End: 2018-10-08

## 2018-10-08 DIAGNOSIS — Z71.89 OTHER SPECIFIED COUNSELING: ICD-10-CM

## 2018-10-08 LAB
RUBV IGG SER-ACNC: 0.8 INDEX — SIGNIFICANT CHANGE UP
RUBV IGG SER-IMP: NEGATIVE — SIGNIFICANT CHANGE UP
T PALLIDUM AB TITR SER: NEGATIVE — SIGNIFICANT CHANGE UP

## 2018-10-08 RX ORDER — TETANUS TOXOID, REDUCED DIPHTHERIA TOXOID AND ACELLULAR PERTUSSIS VACCINE, ADSORBED 5; 2.5; 8; 8; 2.5 [IU]/.5ML; [IU]/.5ML; UG/.5ML; UG/.5ML; UG/.5ML
0.5 SUSPENSION INTRAMUSCULAR ONCE
Qty: 0 | Refills: 0 | Status: COMPLETED | OUTPATIENT
Start: 2018-10-08 | End: 2018-10-08

## 2018-10-08 RX ORDER — IBUPROFEN 200 MG
600 TABLET ORAL EVERY 6 HOURS
Qty: 0 | Refills: 0 | Status: DISCONTINUED | OUTPATIENT
Start: 2018-10-08 | End: 2018-10-09

## 2018-10-08 RX ADMIN — Medication 600 MILLIGRAM(S): at 06:30

## 2018-10-08 RX ADMIN — Medication 975 MILLIGRAM(S): at 18:28

## 2018-10-08 RX ADMIN — Medication 975 MILLIGRAM(S): at 06:02

## 2018-10-08 RX ADMIN — SODIUM CHLORIDE 3 MILLILITER(S): 9 INJECTION INTRAMUSCULAR; INTRAVENOUS; SUBCUTANEOUS at 05:00

## 2018-10-08 RX ADMIN — SODIUM CHLORIDE 3 MILLILITER(S): 9 INJECTION INTRAMUSCULAR; INTRAVENOUS; SUBCUTANEOUS at 12:14

## 2018-10-08 RX ADMIN — Medication 600 MILLIGRAM(S): at 23:15

## 2018-10-08 RX ADMIN — Medication 600 MILLIGRAM(S): at 06:03

## 2018-10-08 RX ADMIN — TETANUS TOXOID, REDUCED DIPHTHERIA TOXOID AND ACELLULAR PERTUSSIS VACCINE, ADSORBED 0.5 MILLILITER(S): 5; 2.5; 8; 8; 2.5 SUSPENSION INTRAMUSCULAR at 21:34

## 2018-10-08 RX ADMIN — Medication 975 MILLIGRAM(S): at 17:38

## 2018-10-08 RX ADMIN — Medication 975 MILLIGRAM(S): at 12:13

## 2018-10-08 RX ADMIN — Medication 975 MILLIGRAM(S): at 13:07

## 2018-10-08 RX ADMIN — Medication 600 MILLIGRAM(S): at 13:07

## 2018-10-08 RX ADMIN — Medication 975 MILLIGRAM(S): at 06:30

## 2018-10-08 RX ADMIN — Medication 600 MILLIGRAM(S): at 17:38

## 2018-10-08 RX ADMIN — Medication 600 MILLIGRAM(S): at 18:28

## 2018-10-08 RX ADMIN — Medication 1 TABLET(S): at 12:12

## 2018-10-08 RX ADMIN — Medication 600 MILLIGRAM(S): at 12:12

## 2018-10-08 RX ADMIN — Medication 975 MILLIGRAM(S): at 23:15

## 2018-10-08 NOTE — DISCHARGE NOTE OB - CARE PLAN
Principal Discharge DX:	Vacuum extractor delivery, delivered  Goal:	well-being  Assessment and plan of treatment:	After discharge, please stay on pelvic rest for 6 weeks, meaning no sexual intercourse, no tampons and no douching.  No driving for 2 weeks as women can loose a lot of blood during delivery and there is a possibility of being lightheaded/fainting.  No lifting objects heavier than baby for two weeks.  Expect to have vaginal bleeding/spotting for up to six weeks.  The bleeding should get lighter and more white/light brown with time.  For bleeding soaking more than a pad an hour or passing clots greater than the size of your fist, come in to the emergency department.    Follow up in clinic in 6 weeks.

## 2018-10-08 NOTE — LACTATION INITIAL EVALUATION - INTERVENTION OUTCOME
Will continue to follow and assist as needed./good return demonstration/verbalizes understanding/demonstrates understanding of teaching/needs not met

## 2018-10-08 NOTE — DISCHARGE NOTE OB - HOSPITAL COURSE
Patient had uncomplicated, vacuum assisted vaginal delivery on 10/7/2018.  Please see delivery note for details.  During postpartum course patient's vitals were stable, vaginal bleeding appropriate, and pain well controlled.  On day of discharge patient was ambulating, her pain controlled with oral medications, had adequate oral intake, and was voiding freely.  Discharge instructions and precautions were given.  Will return to clinic in 6 weeks for postpartum visit.  Postpartum birth control plan is __Micronor__.

## 2018-10-08 NOTE — PROGRESS NOTE ADULT - SUBJECTIVE AND OBJECTIVE BOX
OB Progress Note:  PPD#1    S: 29yo  PPD#1 s/p . Patient feels well. Pain is well controlled. She is tolerating a regular diet and passing flatus. She is voiding spontaneously, and ambulating without difficulty. Denies CP/SOB. Denies lightheadedness/dizziness. Denies N/V. Denies heavy vaginal bleeding.    O:  Vitals:  Vital Signs Last 24 Hrs  T(C): 36.7 (08 Oct 2018 05:46), Max: 36.7 (07 Oct 2018 22:00)  T(F): 98.1 (08 Oct 2018 05:46), Max: 98.1 (07 Oct 2018 22:00)  HR: 98 (08 Oct 2018 05:46) (71 - 102)  BP: 117/76 (08 Oct 2018 05:46) (106/57 - 120/69)  BP(mean): --  RR: 16 (08 Oct 2018 05:46) (16 - 16)  SpO2: 99% (08 Oct 2018 05:46) (98% - 99%)    MEDICATIONS  (STANDING):  acetaminophen   Tablet .. 975 milliGRAM(s) Oral every 6 hours  diphtheria/tetanus/pertussis (acellular) Vaccine (ADAcel) 0.5 milliLiter(s) IntraMuscular once  ibuprofen  Tablet. 600 milliGRAM(s) Oral every 6 hours  oxyCODONE    IR 5 milliGRAM(s) Oral every 3 hours  oxytocin Infusion 41.667 milliUNIT(s)/Min (125 mL/Hr) IV Continuous <Continuous>  prenatal multivitamin 1 Tablet(s) Oral daily  sodium chloride 0.9% lock flush 3 milliLiter(s) IV Push every 8 hours      Labs:  Blood type: O Positive  Rubella IgG: Negative (10-07 @ 01:05)  RPR: Negative                          12.4   9.18 >-----------< 205    ( 10-07 @ 01:05 )             37.2                  Physical Exam:  General: NAD  Abdomen: soft, non-tender, non-distended, fundus firm  Vaginal: Lochia wnl  Extremities: No erythema/edema

## 2018-10-08 NOTE — DISCHARGE NOTE OB - MEDICATION SUMMARY - MEDICATIONS TO STOP TAKING
I will STOP taking the medications listed below when I get home from the hospital:    Xopenex HFA  -- 2 puff(s) inhaled every 6 hours -for shortness of breath and/or wheezing    Keflex 500 mg oral capsule  -- 1 cap(s) by mouth 2 times a day  -- Finish all this medication unless otherwise directed by prescriber.    Flagyl 500 mg oral tablet  -- 1 tab(s) by mouth 2 times a day  -- Do not drink alcoholic beverages when taking this medication.  Finish all this medication unless otherwise directed by prescriber.  May discolor urine or feces.    Macrobid 100 mg oral capsule  -- 1 cap(s) by mouth 2 times a day  -- Finish all this medication unless otherwise directed by prescriber.  May discolor urine or feces.  Take with food or milk.    Ceftin 500 mg oral tablet  -- 1 tab(s) by mouth every 12 hours  -- Finish all this medication unless otherwise directed by prescriber.  Medication should be taken with plenty of water.  Take with food or milk.    sulfamethoxazole-trimethoprim 800 mg-160 mg oral tablet  -- 1 tab(s) by mouth 2 times a day   -- Avoid prolonged or excessive exposure to direct and/or artificial sunlight while taking this medication.  Finish all this medication unless otherwise directed by prescriber.  Medication should be taken with plenty of water.

## 2018-10-08 NOTE — LACTATION INITIAL EVALUATION - LACTATION INTERVENTIONS
Assisted with deeper latch and proper positioning.  Shown "sniff and sandwich" techniques.  Reviewed benefits of safe skin to skin and exclusive breastfeeding.  Educated in use of the NEW Sleep NumberS ezra.  Encouraged to keep feeding log and to feed on cue./initiate skin to skin/initiate hand expression routine

## 2018-10-08 NOTE — DISCHARGE NOTE OB - PLAN OF CARE
well-being After discharge, please stay on pelvic rest for 6 weeks, meaning no sexual intercourse, no tampons and no douching.  No driving for 2 weeks as women can loose a lot of blood during delivery and there is a possibility of being lightheaded/fainting.  No lifting objects heavier than baby for two weeks.  Expect to have vaginal bleeding/spotting for up to six weeks.  The bleeding should get lighter and more white/light brown with time.  For bleeding soaking more than a pad an hour or passing clots greater than the size of your fist, come in to the emergency department.    Follow up in clinic in 6 weeks.

## 2018-10-08 NOTE — DISCHARGE NOTE OB - MEDICATION SUMMARY - MEDICATIONS TO TAKE
I will START or STAY ON the medications listed below when I get home from the hospital:    acetaminophen 325 mg oral tablet  -- 3 tab(s) by mouth every 6 hours  -- Indication: For Vacuum extractor delivery, delivered    ibuprofen 600 mg oral tablet  -- 1 tab(s) by mouth every 6 hours  -- Indication: For Vacuum extractor delivery, delivered    Prenatal Multivitamins with Folic Acid 1 mg oral tablet  -- 1 tab(s) by mouth once a day  -- Indication: For Vacuum extractor delivery, delivered    norethindrone 0.35 mg oral tablet  -- 1 tab(s) by mouth once a day MDD:1  -- Do not take this drug if you are pregnant.  It is very important that you take or use this exactly as directed.  Do not skip doses or discontinue unless directed by your doctor.    -- Indication: For Vacuum extractor delivery, delivered

## 2018-10-08 NOTE — DISCHARGE NOTE OB - PATIENT PORTAL LINK FT
You can access the [facility] Patient Portal, offered by , by registering with the following website: [email or web address]

## 2018-10-08 NOTE — PROGRESS NOTE ADULT - PROBLEM SELECTOR PLAN 1
- Pain well controlled, continue current pain regimen  - Increase ambulation, SCDs when not ambulating  - Continue regular diet  -Breast Feeding    Pedro Leo PGY-1

## 2018-10-08 NOTE — DISCHARGE NOTE OB - CARE PROVIDER_API CALL
Clinic,   Please follow up with us 6 weeks after your delivery date for vaginal deliveries or 2 weeks after your delivery date for  sections.      Your postpartum visit will be at the Ambulatory Clinic Unit, [facility]: [facility], 3rd floor.    Please schedule an appointment (PHONE # [phone], call after 9 am) or visit during the walk-in hours as follows: MONDAY 3-6 PM, WEDNESDAY 3-6 PM, FRIDAY 9-11 AM; 1-3 PM  Phone: (   )    -  Fax: (   )    -

## 2018-10-09 VITALS
SYSTOLIC BLOOD PRESSURE: 119 MMHG | RESPIRATION RATE: 18 BRPM | TEMPERATURE: 98 F | OXYGEN SATURATION: 99 % | DIASTOLIC BLOOD PRESSURE: 73 MMHG | HEART RATE: 84 BPM

## 2018-10-09 RX ORDER — IBUPROFEN 200 MG
1 TABLET ORAL
Qty: 0 | Refills: 0 | COMMUNITY
Start: 2018-10-09

## 2018-10-09 RX ORDER — NORETHINDRONE 0.35 MG/1
1 TABLET ORAL
Qty: 90 | Refills: 0 | OUTPATIENT
Start: 2018-10-09 | End: 2019-01-06

## 2018-10-09 RX ORDER — ACETAMINOPHEN 500 MG
3 TABLET ORAL
Qty: 0 | Refills: 0 | COMMUNITY
Start: 2018-10-09

## 2018-10-09 RX ADMIN — Medication 975 MILLIGRAM(S): at 12:20

## 2018-10-09 RX ADMIN — Medication 1 TABLET(S): at 12:20

## 2018-10-09 RX ADMIN — Medication 0.5 MILLILITER(S): at 13:21

## 2018-10-09 RX ADMIN — Medication 600 MILLIGRAM(S): at 12:50

## 2018-10-09 RX ADMIN — Medication 100 MILLIGRAM(S): at 05:50

## 2018-10-09 RX ADMIN — Medication 975 MILLIGRAM(S): at 00:00

## 2018-10-09 RX ADMIN — Medication 975 MILLIGRAM(S): at 12:50

## 2018-10-09 RX ADMIN — Medication 600 MILLIGRAM(S): at 05:40

## 2018-10-09 RX ADMIN — Medication 600 MILLIGRAM(S): at 00:00

## 2018-10-09 RX ADMIN — Medication 975 MILLIGRAM(S): at 05:40

## 2018-10-09 RX ADMIN — Medication 600 MILLIGRAM(S): at 12:20

## 2018-10-09 NOTE — PROGRESS NOTE ADULT - SUBJECTIVE AND OBJECTIVE BOX
OB Progress Note: VAVD PPD#2    S: 29yo PPD#2 s/p . Patient feels well. Pain is well controlled. She is tolerating a regular diet and passing flatus. She is voiding spontaneously, and ambulating without difficulty. Denies CP/SOB. Denies lightheadedness/dizziness. Denies N/V. Denies heavy vaginal bleeding.    O:  Vitals:   Vital Signs Last 24 Hrs  T(C): 36.7 (09 Oct 2018 06:00), Max: 36.7 (08 Oct 2018 13:40)  T(F): 98.1 (09 Oct 2018 06:00), Max: 98.1 (08 Oct 2018 13:40)  HR: 84 (09 Oct 2018 06:00) (79 - 90)  BP: 119/73 (09 Oct 2018 06:00) (106/67 - 119/73)  BP(mean): --  RR: 18 (09 Oct 2018 06:00) (18 - 18)  SpO2: 99% (09 Oct 2018 06:00) (99% - 99%)    MEDICATIONS  (STANDING):  acetaminophen   Tablet .. 975 milliGRAM(s) Oral every 6 hours  ibuprofen  Tablet. 600 milliGRAM(s) Oral every 6 hours  measles/mumps/rubella Vaccine 0.5 milliLiter(s) SubCutaneous once  oxyCODONE    IR 5 milliGRAM(s) Oral every 3 hours  prenatal multivitamin 1 Tablet(s) Oral daily  sodium chloride 0.9% lock flush 3 milliLiter(s) IV Push every 8 hours    MEDICATIONS  (PRN):  dibucaine 1% Ointment 1 Application(s) Topical every 4 hours PRN Perineal Discomfort  diphenhydrAMINE 25 milliGRAM(s) Oral every 6 hours PRN Itching  docusate sodium 100 milliGRAM(s) Oral two times a day PRN Stool Softening  glycerin Suppository - Adult 1 Suppository(s) Rectal at bedtime PRN Constipation  hydrocortisone 1% Cream 1 Application(s) Topical every 4 hours PRN perineal discomfort  lanolin Ointment 1 Application(s) Topical every 6 hours PRN Sore Nipples  magnesium hydroxide Suspension 30 milliLiter(s) Oral two times a day PRN Constipation  oxyCODONE    IR 5 milliGRAM(s) Oral every 4 hours PRN Severe Pain (7 -10)  pramoxine 1%/zinc 5% Cream 1 Application(s) Topical every 4 hours PRN perineal discomfort  simethicone 80 milliGRAM(s) Chew every 6 hours PRN Gas  witch hazel Pads 1 Application(s) Topical every 4 hours PRN Perineal Discomfort      Labs:  Blood type: O Positive  Rubella IgG: Negative (10-07 @ 01:05)  RPR: Negative                          12.4   9.18 >-----------< 205    ( 10-07 @ 01:05 )             37.2                  Physical Exam:  General: NAD  Abdomen: soft, non-tender, non-distended, fundus firm  Vaginal: Lochia wnl  Extremities: No erythema/edema

## 2018-10-09 NOTE — PROGRESS NOTE ADULT - PROBLEM SELECTOR PLAN 1
- Pain well controlled, continue current pain regimen  - Increase ambulation, SCDs when not ambulating  - Continue regular diet  - Discharge planning   -Breast Feeding    Pedro Leo PGY-1

## 2018-10-10 ENCOUNTER — EMERGENCY (EMERGENCY)
Facility: HOSPITAL | Age: 28
LOS: 1 days | Discharge: HOME | End: 2018-10-10
Attending: EMERGENCY MEDICINE | Admitting: EMERGENCY MEDICINE
Payer: MEDICAID

## 2018-10-10 VITALS
TEMPERATURE: 98 F | RESPIRATION RATE: 16 BRPM | DIASTOLIC BLOOD PRESSURE: 79 MMHG | HEART RATE: 100 BPM | SYSTOLIC BLOOD PRESSURE: 126 MMHG | OXYGEN SATURATION: 100 %

## 2018-10-10 VITALS — TEMPERATURE: 98 F | DIASTOLIC BLOOD PRESSURE: 76 MMHG | SYSTOLIC BLOOD PRESSURE: 127 MMHG | HEART RATE: 71 BPM

## 2018-10-10 DIAGNOSIS — Z87.42 PERSONAL HISTORY OF OTHER DISEASES OF THE FEMALE GENITAL TRACT: Chronic | ICD-10-CM

## 2018-10-10 DIAGNOSIS — Z98.89 OTHER SPECIFIED POSTPROCEDURAL STATES: Chronic | ICD-10-CM

## 2018-10-10 LAB
ALBUMIN SERPL ELPH-MCNC: 3.2 G/DL — LOW (ref 3.3–5)
ALP SERPL-CCNC: 142 U/L — HIGH (ref 40–120)
ALT FLD-CCNC: 35 U/L — HIGH (ref 4–33)
APPEARANCE UR: CLEAR — SIGNIFICANT CHANGE UP
AST SERPL-CCNC: 44 U/L — HIGH (ref 4–32)
BACTERIA # UR AUTO: NEGATIVE — SIGNIFICANT CHANGE UP
BASOPHILS # BLD AUTO: 0.05 K/UL — SIGNIFICANT CHANGE UP (ref 0–0.2)
BASOPHILS NFR BLD AUTO: 0.4 % — SIGNIFICANT CHANGE UP (ref 0–2)
BILIRUB SERPL-MCNC: < 0.2 MG/DL — LOW (ref 0.2–1.2)
BILIRUB UR-MCNC: NEGATIVE — SIGNIFICANT CHANGE UP
BLOOD UR QL VISUAL: HIGH
BUN SERPL-MCNC: 11 MG/DL — SIGNIFICANT CHANGE UP (ref 7–23)
CALCIUM SERPL-MCNC: 9 MG/DL — SIGNIFICANT CHANGE UP (ref 8.4–10.5)
CHLORIDE SERPL-SCNC: 103 MMOL/L — SIGNIFICANT CHANGE UP (ref 98–107)
CO2 SERPL-SCNC: 23 MMOL/L — SIGNIFICANT CHANGE UP (ref 22–31)
COLOR SPEC: COLORLESS — SIGNIFICANT CHANGE UP
CREAT SERPL-MCNC: 0.66 MG/DL — SIGNIFICANT CHANGE UP (ref 0.5–1.3)
EOSINOPHIL # BLD AUTO: 0.25 K/UL — SIGNIFICANT CHANGE UP (ref 0–0.5)
EOSINOPHIL NFR BLD AUTO: 2 % — SIGNIFICANT CHANGE UP (ref 0–6)
GLUCOSE SERPL-MCNC: 79 MG/DL — SIGNIFICANT CHANGE UP (ref 70–99)
GLUCOSE UR-MCNC: NEGATIVE — SIGNIFICANT CHANGE UP
HCT VFR BLD CALC: 24.3 % — LOW (ref 34.5–45)
HCT VFR BLD CALC: 26.4 % — LOW (ref 34.5–45)
HGB BLD-MCNC: 8 G/DL — LOW (ref 11.5–15.5)
HGB BLD-MCNC: 8.8 G/DL — LOW (ref 11.5–15.5)
HYALINE CASTS # UR AUTO: NEGATIVE — SIGNIFICANT CHANGE UP
IMM GRANULOCYTES # BLD AUTO: 0.16 # — SIGNIFICANT CHANGE UP
IMM GRANULOCYTES NFR BLD AUTO: 1.3 % — SIGNIFICANT CHANGE UP (ref 0–1.5)
KETONES UR-MCNC: NEGATIVE — SIGNIFICANT CHANGE UP
LEUKOCYTE ESTERASE UR-ACNC: SIGNIFICANT CHANGE UP
LYMPHOCYTES # BLD AUTO: 18.7 % — SIGNIFICANT CHANGE UP (ref 13–44)
LYMPHOCYTES # BLD AUTO: 2.3 K/UL — SIGNIFICANT CHANGE UP (ref 1–3.3)
MCHC RBC-ENTMCNC: 32.9 % — SIGNIFICANT CHANGE UP (ref 32–36)
MCHC RBC-ENTMCNC: 33.2 PG — SIGNIFICANT CHANGE UP (ref 27–34)
MCHC RBC-ENTMCNC: 33.3 % — SIGNIFICANT CHANGE UP (ref 32–36)
MCHC RBC-ENTMCNC: 34.4 PG — HIGH (ref 27–34)
MCV RBC AUTO: 100.8 FL — HIGH (ref 80–100)
MCV RBC AUTO: 103.1 FL — HIGH (ref 80–100)
MONOCYTES # BLD AUTO: 0.97 K/UL — HIGH (ref 0–0.9)
MONOCYTES NFR BLD AUTO: 7.9 % — SIGNIFICANT CHANGE UP (ref 2–14)
NEUTROPHILS # BLD AUTO: 8.58 K/UL — HIGH (ref 1.8–7.4)
NEUTROPHILS NFR BLD AUTO: 69.7 % — SIGNIFICANT CHANGE UP (ref 43–77)
NITRITE UR-MCNC: NEGATIVE — SIGNIFICANT CHANGE UP
NRBC # FLD: 0 — SIGNIFICANT CHANGE UP
NRBC # FLD: 0 — SIGNIFICANT CHANGE UP
NT-PROBNP SERPL-SCNC: 398.9 PG/ML — SIGNIFICANT CHANGE UP
OSMOLALITY SERPL: 314 MOSMO/KG — HIGH (ref 275–295)
PH UR: 7.5 — SIGNIFICANT CHANGE UP (ref 5–8)
PLATELET # BLD AUTO: 215 K/UL — SIGNIFICANT CHANGE UP (ref 150–400)
PLATELET # BLD AUTO: 246 K/UL — SIGNIFICANT CHANGE UP (ref 150–400)
PMV BLD: 10.7 FL — SIGNIFICANT CHANGE UP (ref 7–13)
PMV BLD: 11 FL — SIGNIFICANT CHANGE UP (ref 7–13)
POTASSIUM SERPL-MCNC: 4.4 MMOL/L — SIGNIFICANT CHANGE UP (ref 3.5–5.3)
POTASSIUM SERPL-SCNC: 4.4 MMOL/L — SIGNIFICANT CHANGE UP (ref 3.5–5.3)
PROT SERPL-MCNC: 6.5 G/DL — SIGNIFICANT CHANGE UP (ref 6–8.3)
PROT UR-MCNC: NEGATIVE — SIGNIFICANT CHANGE UP
RBC # BLD: 2.41 M/UL — LOW (ref 3.8–5.2)
RBC # BLD: 2.56 M/UL — LOW (ref 3.8–5.2)
RBC # FLD: 13 % — SIGNIFICANT CHANGE UP (ref 10.3–14.5)
RBC # FLD: 13.1 % — SIGNIFICANT CHANGE UP (ref 10.3–14.5)
RBC CASTS # UR COMP ASSIST: SIGNIFICANT CHANGE UP (ref 0–?)
SODIUM SERPL-SCNC: 140 MMOL/L — SIGNIFICANT CHANGE UP (ref 135–145)
SP GR SPEC: 1.01 — SIGNIFICANT CHANGE UP (ref 1–1.04)
SQUAMOUS # UR AUTO: SIGNIFICANT CHANGE UP
TROPONIN T, HIGH SENSITIVITY: < 6 NG/L — SIGNIFICANT CHANGE UP (ref ?–14)
UROBILINOGEN FLD QL: NORMAL — SIGNIFICANT CHANGE UP
WBC # BLD: 10.2 K/UL — SIGNIFICANT CHANGE UP (ref 3.8–10.5)
WBC # BLD: 12.31 K/UL — HIGH (ref 3.8–10.5)
WBC # FLD AUTO: 10.2 K/UL — SIGNIFICANT CHANGE UP (ref 3.8–10.5)
WBC # FLD AUTO: 12.31 K/UL — HIGH (ref 3.8–10.5)
WBC UR QL: SIGNIFICANT CHANGE UP (ref 0–?)

## 2018-10-10 PROCEDURE — 93970 EXTREMITY STUDY: CPT | Mod: 26

## 2018-10-10 PROCEDURE — 99284 EMERGENCY DEPT VISIT MOD MDM: CPT

## 2018-10-10 PROCEDURE — 71275 CT ANGIOGRAPHY CHEST: CPT | Mod: 26

## 2018-10-10 RX ORDER — ACETAMINOPHEN 500 MG
650 TABLET ORAL ONCE
Qty: 0 | Refills: 0 | Status: COMPLETED | OUTPATIENT
Start: 2018-10-10 | End: 2018-10-10

## 2018-10-10 RX ORDER — MECLIZINE HCL 12.5 MG
12.5 TABLET ORAL ONCE
Qty: 0 | Refills: 0 | Status: DISCONTINUED | OUTPATIENT
Start: 2018-10-10 | End: 2018-10-10

## 2018-10-10 RX ORDER — SODIUM CHLORIDE 9 MG/ML
500 INJECTION INTRAMUSCULAR; INTRAVENOUS; SUBCUTANEOUS ONCE
Qty: 0 | Refills: 0 | Status: COMPLETED | OUTPATIENT
Start: 2018-10-10 | End: 2018-10-10

## 2018-10-10 RX ADMIN — Medication 650 MILLIGRAM(S): at 20:22

## 2018-10-10 RX ADMIN — Medication 650 MILLIGRAM(S): at 17:14

## 2018-10-10 RX ADMIN — SODIUM CHLORIDE 500 MILLILITER(S): 9 INJECTION INTRAMUSCULAR; INTRAVENOUS; SUBCUTANEOUS at 17:14

## 2018-10-10 NOTE — ED PROVIDER NOTE - PHYSICAL EXAMINATION
Vital signs reviewed.   CONSTITUTIONAL: Well-appearing; well-nourished; in no apparent distress. Non-toxic appearing.   HEAD: Normocephalic, atraumatic.  EYES: PERRL, EOM intact, conjunctiva and sclera WNL.  ENT: normal nose; no rhinorrhea; normal pharynx with no tonsillar hypertrophy, no erythema, no exudate, no lymphadenopathy. No tongue elevation. No drooling. No oropharyngeal edema. Airway patent.   NECK/LYMPH: Supple; non-tender; no cervical lymphadenopathy.  CARD: Normal S1, S2; no murmurs, rubs, or gallops noted.  RESP: Normal chest excursion with respiration; breath sounds clear and equal bilaterally; no wheezes, rhonchi, or rales.  ABD/GI: soft, non-distended; no palpable organomegaly, no pulsatile mass. NCVAT b/l.   EXT/MS: moves all extremities; distal pulses are normal, Mild pedal edema b/l, Compartments soft. No crepitus or obvious deformities. No calf pain  BREAST: Breast Exam performed with chaperone MD mitch Wright, no erythema, tenderness, massess, lesions, warmth or signs of infection noted.   SKIN: Normal for age and race; warm; dry; good turgor; no apparent lesions or exudate noted. No erythema, no warmth, no signs of infection.   NEURO: Awake, alert, oriented x 3, no gross deficits,  no motor or sensory deficit noted.  PSYCH: Normal mood; appropriate affect.

## 2018-10-10 NOTE — ED PROVIDER NOTE - ATTENDING CONTRIBUTION TO CARE
Gong: I have seen and examined the patient face to face, have reviewed and addended the HPI, PE and a/p as necessary.     27 yo PCOS endometriosis now  s/p  on 10/7/2018 a/w cp b/l LE swelling.  Pt was d/c yesterday noted to have swelling in LE at the time of delivery and some chest discomfort which resolved with tylenol and motrin.  Pt arrives today with increasing pain with deep inspiration and now increasing swelling in her R leg and her ankles feel tight more on R than L.  Denies sob, dizziness, headaches, tongue elevation, drooling, hoarseness, hx DVT/PE, weakness, fevers, chills, n/v/d or any other complaints. Pt admits to epidural injection for recent pregnancy. Pt is ambulatory in ED.    EKG reviewed    GEN - NAD; well appearing; A+O x3; non-toxic appearing CARD -s1s2, RRR, no M,G,R; PULM - CTA b/l, symmetric breath sounds; ABD:  +BS, ND, NT, soft, no guarding, no rebound, no masses; BACK: no CVA tenderness, Normal  spine; EXT: symmetric pulses, 2+ dp, capillary refill < 2 seconds, no clubbing, no cyanosis, +edema in 4 extremity    27 yo postpartum x 5 days, a/w cp/LE swelling.  Given symptoms and recent pregnancy concern for PE/dvt.  Will eval with bilateral us of LE, and CTA of chest. Follow up trop, labs and re-eval.

## 2018-10-10 NOTE — ED ADULT NURSE NOTE - OBJECTIVE STATEMENT
Patient reports having b/l lower extremity non-pitting edema since last night. Patient reports having vaginal delivery on October 7th, with no complications - patient denies having pre-eclampsia/eclampsia. Patient also reports intermittent chest pain and SOB associated with pain in legs. Patient appears NAD, VSS, Patient's labs drawn, pending results and further evaluation.

## 2018-10-10 NOTE — ED PROVIDER NOTE - MEDICAL DECISION MAKING DETAILS
Pt is a 28 y.o female with PMHx of PCOS, endometriosis, hx hernia repair,  who presents to ED s/p  10/7/18 c/o chest pain and b/l LE swelling.  DDx- includes but not limited to; MSK, dehydration, post-partum hormone r/o PE/ acs, r/o DVT  Plan- labs, trop, ecg (NSR no acute ST changes), CTA, LE doppler b/l. UA/UC. Will monitor closely

## 2018-10-10 NOTE — ED PROVIDER NOTE - OBJECTIVE STATEMENT
HPI: Pt is a 28 y.o female with PMHx of PCOS, endometriosis, hx hernia repair,  who presents to ED s/p  10/7/18 c/o chest pain and b/l LE swelling. As per patient states that she was d/c yesterday from hospital s/p delivery, admits she had mild swelling of LE prior to discharge that she feels has worsened from yesterday to today, admitting she feels "whole body is swollen". Pt also mentions episode of chest pain this morning around 7 am, described as sharp/pressure like that was left sided but with inspiration radiated to Rt side and was painful. Sx lasted for 30 mins and resolved. Pt also states she "just feels uneasy". Also mentioned some dysuria. Denies sob, dizziness, headaches, tongue elevation, drooling, hoarseness, hx DVT/PE, weakness, fevers, chills, n/v/d or any other complaints. Pt admits to epidural injection for recent pregnancy. Pt is ambulatory in ED.

## 2018-10-10 NOTE — ED ADULT NURSE NOTE - CHPI ED NUR SYMPTOMS NEG
no back pain/no chills/no nausea/no pain/no loss of consciousness/no decreased eating/drinking/no vomiting/no headache/no dizziness/no fever

## 2018-10-10 NOTE — ED PROVIDER NOTE - PROGRESS NOTE DETAILS
Gautam: OB/GYN paged. Pending call back. Pt just had CTA, pending results. Spoke to OB resident, states to callback if needed s/p results. Pt to be signed out to resident at 7 pm. Will make them aware of plan. Bennie: pt received as signout and reassesssed. Pt still with intermittent cp and sob, no increase in vag bleeding, notes mild suprapubic abd discomfort. All symptoms present since delivery. Pt informed of negative DVT results. Pending CTA results. Bennie: pt feels better, agrees with plan for dispo to home. Hb dropped to 8.0 in setting of fluid administration and multiple blood draws. Pt agrees to return for any worsened abd pain, vaginal bleeding. She agrees to follow with gynecologist. Per OBGYN the drop noted in Hb is expected for a vaginal delivery and not concerning.

## 2018-10-10 NOTE — ED ADULT TRIAGE NOTE - CHIEF COMPLAINT QUOTE
Pt 3 days s/p vag delivery w/o compilations, c/o worsening bilateral lower extremity edema since delivery. Pt denies SOB/CP. Pt to be seen in ED as per L&D triage.

## 2018-10-12 LAB
BACTERIA UR CULT: SIGNIFICANT CHANGE UP
SPECIMEN SOURCE: SIGNIFICANT CHANGE UP

## 2018-10-15 DIAGNOSIS — Z76.89 PERSONS ENCOUNTERING HEALTH SERVICES IN OTHER SPECIFIED CIRCUMSTANCES: ICD-10-CM

## 2018-10-22 ENCOUNTER — EMERGENCY (EMERGENCY)
Facility: HOSPITAL | Age: 28
LOS: 1 days | Discharge: LEFT BEFORE DISCHARGE | End: 2018-10-22
Attending: EMERGENCY MEDICINE

## 2018-10-22 VITALS
TEMPERATURE: 98 F | DIASTOLIC BLOOD PRESSURE: 57 MMHG | OXYGEN SATURATION: 99 % | RESPIRATION RATE: 18 BRPM | HEART RATE: 85 BPM | SYSTOLIC BLOOD PRESSURE: 96 MMHG

## 2018-10-22 DIAGNOSIS — Z87.42 PERSONAL HISTORY OF OTHER DISEASES OF THE FEMALE GENITAL TRACT: Chronic | ICD-10-CM

## 2018-10-22 DIAGNOSIS — Z98.89 OTHER SPECIFIED POSTPROCEDURAL STATES: Chronic | ICD-10-CM

## 2018-10-22 PROBLEM — M79.641 PAIN OF RIGHT HAND: Status: ACTIVE | Noted: 2018-10-22

## 2018-10-22 NOTE — ED ADULT NURSE NOTE - CAS ED LWBS REASON YN
Called several times, no where to be found,left without being seen by  or this RN,,Called via phone statesshelefttosee her own doctor and is at her doctor's office at present. aware.

## 2018-10-24 ENCOUNTER — APPOINTMENT (OUTPATIENT)
Dept: ORTHOPEDIC SURGERY | Facility: CLINIC | Age: 28
End: 2018-10-24
Payer: MEDICAID

## 2018-10-24 VITALS
SYSTOLIC BLOOD PRESSURE: 110 MMHG | HEIGHT: 59 IN | WEIGHT: 142 LBS | DIASTOLIC BLOOD PRESSURE: 74 MMHG | BODY MASS INDEX: 28.63 KG/M2 | HEART RATE: 74 BPM

## 2018-10-24 DIAGNOSIS — Z78.9 OTHER SPECIFIED HEALTH STATUS: ICD-10-CM

## 2018-10-24 DIAGNOSIS — M79.641 PAIN IN RIGHT HAND: ICD-10-CM

## 2018-10-24 PROCEDURE — 29125 APPL SHORT ARM SPLINT STATIC: CPT | Mod: RT

## 2018-10-24 PROCEDURE — 99203 OFFICE O/P NEW LOW 30 MIN: CPT | Mod: 25

## 2018-11-01 PROCEDURE — G9005: CPT

## 2018-11-02 ENCOUNTER — OUTPATIENT (OUTPATIENT)
Dept: OUTPATIENT SERVICES | Facility: HOSPITAL | Age: 28
LOS: 1 days | End: 2018-11-02
Payer: MEDICAID

## 2018-11-02 VITALS
WEIGHT: 145.06 LBS | DIASTOLIC BLOOD PRESSURE: 67 MMHG | SYSTOLIC BLOOD PRESSURE: 101 MMHG | TEMPERATURE: 99 F | HEIGHT: 58.25 IN | HEART RATE: 86 BPM | OXYGEN SATURATION: 98 % | RESPIRATION RATE: 16 BRPM

## 2018-11-02 DIAGNOSIS — Z01.818 ENCOUNTER FOR OTHER PREPROCEDURAL EXAMINATION: ICD-10-CM

## 2018-11-02 DIAGNOSIS — Z98.89 OTHER SPECIFIED POSTPROCEDURAL STATES: Chronic | ICD-10-CM

## 2018-11-02 DIAGNOSIS — S62.324B: ICD-10-CM

## 2018-11-02 DIAGNOSIS — Z87.42 PERSONAL HISTORY OF OTHER DISEASES OF THE FEMALE GENITAL TRACT: Chronic | ICD-10-CM

## 2018-11-02 DIAGNOSIS — S62.324A DISPLACED FRACTURE OF SHAFT OF FOURTH METACARPAL BONE, RIGHT HAND, INITIAL ENCOUNTER FOR CLOSED FRACTURE: ICD-10-CM

## 2018-11-02 LAB
ANION GAP SERPL CALC-SCNC: 7 MMOL/L — SIGNIFICANT CHANGE UP (ref 5–17)
APTT BLD: 30.2 SEC — SIGNIFICANT CHANGE UP (ref 27.5–36.3)
BUN SERPL-MCNC: 15 MG/DL — SIGNIFICANT CHANGE UP (ref 7–18)
CALCIUM SERPL-MCNC: 8.8 MG/DL — SIGNIFICANT CHANGE UP (ref 8.4–10.5)
CHLORIDE SERPL-SCNC: 108 MMOL/L — SIGNIFICANT CHANGE UP (ref 96–108)
CO2 SERPL-SCNC: 25 MMOL/L — SIGNIFICANT CHANGE UP (ref 22–31)
CREAT SERPL-MCNC: 0.69 MG/DL — SIGNIFICANT CHANGE UP (ref 0.5–1.3)
GLUCOSE SERPL-MCNC: 90 MG/DL — SIGNIFICANT CHANGE UP (ref 70–99)
HCG SERPL-ACNC: <1 MIU/ML — SIGNIFICANT CHANGE UP
HCT VFR BLD CALC: 35.1 % — SIGNIFICANT CHANGE UP (ref 34.5–45)
HGB BLD-MCNC: 11.2 G/DL — LOW (ref 11.5–15.5)
INR BLD: 0.98 RATIO — SIGNIFICANT CHANGE UP (ref 0.88–1.16)
MCHC RBC-ENTMCNC: 31.4 PG — SIGNIFICANT CHANGE UP (ref 27–34)
MCHC RBC-ENTMCNC: 32 GM/DL — SIGNIFICANT CHANGE UP (ref 32–36)
MCV RBC AUTO: 98 FL — SIGNIFICANT CHANGE UP (ref 80–100)
PLATELET # BLD AUTO: 246 K/UL — SIGNIFICANT CHANGE UP (ref 150–400)
POTASSIUM SERPL-MCNC: 4 MMOL/L — SIGNIFICANT CHANGE UP (ref 3.5–5.3)
POTASSIUM SERPL-SCNC: 4 MMOL/L — SIGNIFICANT CHANGE UP (ref 3.5–5.3)
PROTHROM AB SERPL-ACNC: 10.9 SEC — SIGNIFICANT CHANGE UP (ref 10–12.9)
RBC # BLD: 3.58 M/UL — LOW (ref 3.8–5.2)
RBC # FLD: 11.8 % — SIGNIFICANT CHANGE UP (ref 10.3–14.5)
SODIUM SERPL-SCNC: 140 MMOL/L — SIGNIFICANT CHANGE UP (ref 135–145)
WBC # BLD: 6.7 K/UL — SIGNIFICANT CHANGE UP (ref 3.8–10.5)
WBC # FLD AUTO: 6.7 K/UL — SIGNIFICANT CHANGE UP (ref 3.8–10.5)

## 2018-11-02 PROCEDURE — 86901 BLOOD TYPING SEROLOGIC RH(D): CPT

## 2018-11-02 PROCEDURE — 85027 COMPLETE CBC AUTOMATED: CPT

## 2018-11-02 PROCEDURE — 85610 PROTHROMBIN TIME: CPT

## 2018-11-02 PROCEDURE — G0463: CPT

## 2018-11-02 PROCEDURE — 80048 BASIC METABOLIC PNL TOTAL CA: CPT

## 2018-11-02 PROCEDURE — 85730 THROMBOPLASTIN TIME PARTIAL: CPT

## 2018-11-02 PROCEDURE — 84702 CHORIONIC GONADOTROPIN TEST: CPT

## 2018-11-02 PROCEDURE — 86850 RBC ANTIBODY SCREEN: CPT

## 2018-11-02 PROCEDURE — 86900 BLOOD TYPING SEROLOGIC ABO: CPT

## 2018-11-02 PROCEDURE — 36415 COLL VENOUS BLD VENIPUNCTURE: CPT

## 2018-11-02 NOTE — H&P PST ADULT - FAMILY HISTORY
Father  Still living? No  Diabetes mellitus, type 2, Age at diagnosis: Age Unknown     Mother  Still living? Yes, Estimated age: Age Unknown  Diabetes mellitus, type 2, Age at diagnosis: Age Unknown     Sibling  Still living? Yes, Estimated age: Age Unknown  Family history of essential hypertension, Age at diagnosis: Age Unknown

## 2018-11-02 NOTE — H&P PST ADULT - ASSESSMENT
29 y/o female one month postpartum diagnosed with displaced fracture of shaft of fourth metacarpal bone, right hand scheduled for open reduction internal fixation of right ring finger metacarpal fracture 11/8/2018

## 2018-11-02 NOTE — H&P PST ADULT - NEGATIVE GENERAL SYMPTOMS
no weight gain/no polyphagia/no anorexia/no chills/no sweating/no malaise/no polyuria/no fever/no weight loss/no polydipsia/no fatigue

## 2018-11-02 NOTE — H&P PST ADULT - HISTORY OF PRESENT ILLNESS
27 y/o female one month postpartum, presents to [facility] for presurgical evaluation. States she fractured her right ring finger 1023/2018, in an incident involving her dog. She was diagnosed with displaced fracture of shaft of fourth metacarpal bone, right hand and is scheduled for

## 2018-11-02 NOTE — H&P PST ADULT - PMH
Asthma in childhood with multiple ER visits  No recent hospitalizarion. No meds. Last asthma attack 13 years ago  Endometriosis    Ovarian cyst    Peritoneal adhesions    Umbilical hernia

## 2018-11-02 NOTE — H&P PST ADULT - RS GEN PE MLT RESP DETAILS PC
no subcutaneous emphysema/clear to auscultation bilaterally/breath sounds equal/airway patent/no rhonchi/no intercostal retractions/normal/respirations non-labored/good air movement/no wheezes/no rales/no chest wall tenderness

## 2018-11-02 NOTE — H&P PST ADULT - COMFORT LEVEL, ACCEPTABLE
"DATE: 6/15/2018  PATIENT: Sonny Mendez    Attending Physician: Tacos Lu M.D.    HISTORY:  Sonny Mendez is a 69 y.o. male who returns for follow up evaluation of  his left foot crush injury.  He underwent irrigation and debridement, ORIF of the great toe distal phalanx, nail bed repairs of the first and second toes and partial amputation of the third toe, 6/8/18.  States his pain is 7/10.  He returns for his first postoperative visit.    PMH/PSH/FamHx/SocHx:  Reviewed and unchanged from prior visit    ROS:  Constitution: Negative for chills, fever, and sweats. Negative for unexplained weight loss.  HENT: Negative for headaches and blurry vision.   Cardiovascular: Negative for chest pain, irregular heartbeat, leg swelling and palpitations.   Respiratory: Negative for cough and shortness of breath.   Gastrointestinal: Negative for abdominal pain, heartburn, nausea and vomiting.   Genitourinary: Negative for bladder incontinence and dysuria.   Musculoskeletal: Negative for systemic arthritis, joint swelling, muscle weakness and myalgias.   Neurological: Negative for numbness.   Psychiatric/Behavioral: Negative for depression.   Endocrine: Negative for polyuria.   Hematologic/Lymphatic: Negative for bleeding disorders.  Skin: Negative for poor wound healing.       EXAM:  Ht 5' 7" (1.702 m)   Wt 89.4 kg (197 lb)   BMI 30.85 kg/m²   Healthy-appearing male in no acute distress.  Dressing removed from the left foot.  The skin about the great toe volarly is mildly macerated.  Second and third toes are intact.  Nails are still present on the first and second toes.  Alignment is satisfactory.  Pin sites clean.    IMAGING:   X-rays of the left foot are personally reviewed.  Fracture alignment of the great toe and pin position of the great and second toes are satisfactory.    ASSESSMENT:  Status post irrigation and debridement, ORIF great toe distal phalanx, nail bed repairs great and second toe and partial " amputation third toe for crush injury left foot, 6/8/18    PLAN:  The implications of the patient's evolution of symptoms and findings were explained to the patient and his wife in detail.  Deonte is doing satisfactory postoperatively.  We'll start local wound care with [facility].  I like to see him back in 10 days' time for reexam..X-rays a left foot to be performed at the next visit.  Pain medicine refill today.    This note was dictated using voice recognition software. Please excuse any grammatical or typographical errors.   0

## 2018-11-07 ENCOUNTER — TRANSCRIPTION ENCOUNTER (OUTPATIENT)
Age: 28
End: 2018-11-07

## 2018-11-08 ENCOUNTER — OUTPATIENT (OUTPATIENT)
Dept: OUTPATIENT SERVICES | Facility: HOSPITAL | Age: 28
LOS: 1 days | End: 2018-11-08
Payer: MEDICAID

## 2018-11-08 ENCOUNTER — APPOINTMENT (OUTPATIENT)
Dept: ORTHOPEDIC SURGERY | Facility: HOSPITAL | Age: 28
End: 2018-11-08

## 2018-11-08 VITALS
DIASTOLIC BLOOD PRESSURE: 53 MMHG | OXYGEN SATURATION: 100 % | RESPIRATION RATE: 16 BRPM | SYSTOLIC BLOOD PRESSURE: 100 MMHG | WEIGHT: 145.06 LBS | TEMPERATURE: 98 F | HEART RATE: 64 BPM | HEIGHT: 58.25 IN

## 2018-11-08 VITALS
OXYGEN SATURATION: 99 % | TEMPERATURE: 98 F | SYSTOLIC BLOOD PRESSURE: 114 MMHG | DIASTOLIC BLOOD PRESSURE: 78 MMHG | HEART RATE: 77 BPM | RESPIRATION RATE: 13 BRPM

## 2018-11-08 DIAGNOSIS — Z87.42 PERSONAL HISTORY OF OTHER DISEASES OF THE FEMALE GENITAL TRACT: Chronic | ICD-10-CM

## 2018-11-08 DIAGNOSIS — Z98.89 OTHER SPECIFIED POSTPROCEDURAL STATES: Chronic | ICD-10-CM

## 2018-11-08 DIAGNOSIS — S62.324B: ICD-10-CM

## 2018-11-08 PROCEDURE — 26615 TREAT METACARPAL FRACTURE: CPT | Mod: F8

## 2018-11-08 PROCEDURE — 26615 TREAT METACARPAL FRACTURE: CPT | Mod: RT

## 2018-11-08 PROCEDURE — 86901 BLOOD TYPING SEROLOGIC RH(D): CPT

## 2018-11-08 PROCEDURE — 86900 BLOOD TYPING SEROLOGIC ABO: CPT

## 2018-11-08 PROCEDURE — 86850 RBC ANTIBODY SCREEN: CPT

## 2018-11-08 RX ORDER — ASPIRIN/CALCIUM CARB/MAGNESIUM 324 MG
1 TABLET ORAL
Qty: 30 | Refills: 0
Start: 2018-11-08 | End: 2018-12-07

## 2018-11-08 RX ORDER — CEPHALEXIN 500 MG
1 CAPSULE ORAL
Qty: 9 | Refills: 0
Start: 2018-11-08 | End: 2018-11-10

## 2018-11-08 RX ORDER — CELECOXIB 200 MG/1
200 CAPSULE ORAL ONCE
Qty: 0 | Refills: 0 | Status: COMPLETED | OUTPATIENT
Start: 2018-11-08 | End: 2018-11-08

## 2018-11-08 RX ORDER — OXYCODONE AND ACETAMINOPHEN 5; 325 MG/1; MG/1
1 TABLET ORAL EVERY 4 HOURS
Qty: 0 | Refills: 0 | Status: DISCONTINUED | OUTPATIENT
Start: 2018-11-08 | End: 2018-11-08

## 2018-11-08 RX ORDER — ONDANSETRON 8 MG/1
4 TABLET, FILM COATED ORAL EVERY 6 HOURS
Qty: 0 | Refills: 0 | Status: DISCONTINUED | OUTPATIENT
Start: 2018-11-08 | End: 2018-11-16

## 2018-11-08 RX ORDER — ACETAMINOPHEN 500 MG
1000 TABLET ORAL ONCE
Qty: 0 | Refills: 0 | Status: DISCONTINUED | OUTPATIENT
Start: 2018-11-08 | End: 2018-11-08

## 2018-11-08 RX ORDER — HYDROMORPHONE HYDROCHLORIDE 2 MG/ML
0.5 INJECTION INTRAMUSCULAR; INTRAVENOUS; SUBCUTANEOUS
Qty: 0 | Refills: 0 | Status: DISCONTINUED | OUTPATIENT
Start: 2018-11-08 | End: 2018-11-08

## 2018-11-08 RX ORDER — ACETAMINOPHEN 500 MG
975 TABLET ORAL ONCE
Qty: 0 | Refills: 0 | Status: COMPLETED | OUTPATIENT
Start: 2018-11-08 | End: 2018-11-08

## 2018-11-08 RX ORDER — SODIUM CHLORIDE 9 MG/ML
3 INJECTION INTRAMUSCULAR; INTRAVENOUS; SUBCUTANEOUS EVERY 8 HOURS
Qty: 0 | Refills: 0 | Status: DISCONTINUED | OUTPATIENT
Start: 2018-11-08 | End: 2018-11-08

## 2018-11-08 RX ADMIN — SODIUM CHLORIDE 3 MILLILITER(S): 9 INJECTION INTRAMUSCULAR; INTRAVENOUS; SUBCUTANEOUS at 10:58

## 2018-11-08 RX ADMIN — Medication 975 MILLIGRAM(S): at 10:58

## 2018-11-08 RX ADMIN — CELECOXIB 200 MILLIGRAM(S): 200 CAPSULE ORAL at 10:58

## 2018-11-08 NOTE — BRIEF OPERATIVE NOTE - POST-OP DX
Closed displaced fracture of shaft of fourth metacarpal bone of right hand, initial encounter  11/08/2018    Active  Viraj Bowling

## 2018-11-08 NOTE — ASU DISCHARGE PLAN (ADULT/PEDIATRIC). - MEDICATION SUMMARY - MEDICATIONS TO TAKE
I will START or STAY ON the medications listed below when I get home from the hospital:    Percocet 5/325 oral tablet  -- 1-2  tab(s) by mouth every 6 hours, As Needed- for moderate pain MDD:7  -- Caution federal law prohibits the transfer of this drug to any person other  than the person for whom it was prescribed.  May cause drowsiness.  Alcohol may intensify this effect.  Use care when operating dangerous machinery.  This prescription cannot be refilled.  This product contains acetaminophen.  Do not use  with any other product containing acetaminophen to prevent possible liver damage.  Using more of this medication than prescribed may cause serious breathing problems.    -- Indication: For Pain    aspirin 325 mg oral tablet  -- 1 tab(s) by mouth once a day   -- Take with food or milk.    -- Indication: For DVT prophylaxis    Keflex 500 mg oral capsule  -- 1 cap(s) by mouth 3 times a day MDD:3  -- Finish all this medication unless otherwise directed by prescriber.    -- Indication: For Antibiotics    Colace 100 mg oral capsule  -- 1 cap(s) by mouth every other day, As Needed  -- Indication: For Constipation

## 2018-11-08 NOTE — BRIEF OPERATIVE NOTE - PROCEDURE
<<-----Click on this checkbox to enter Procedure ORIF, fracture, metacarpal bone  11/08/2018    Active  ERICA

## 2018-11-08 NOTE — ASU DISCHARGE PLAN (ADULT/PEDIATRIC). - SPECIAL INSTRUCTIONS
- Keep splint dry and clean.   - Non-weight bearing to RUE. Keep arm in sling  - Follow up with Dr. Bowling in office in ONE week at [phone]

## 2018-11-16 ENCOUNTER — APPOINTMENT (OUTPATIENT)
Dept: ORTHOPEDIC SURGERY | Facility: CLINIC | Age: 28
End: 2018-11-16
Payer: MEDICAID

## 2018-11-16 PROCEDURE — 99024 POSTOP FOLLOW-UP VISIT: CPT

## 2018-11-16 PROCEDURE — 73130 X-RAY EXAM OF HAND: CPT | Mod: RT

## 2018-11-30 ENCOUNTER — APPOINTMENT (OUTPATIENT)
Dept: ORTHOPEDIC SURGERY | Facility: CLINIC | Age: 28
End: 2018-11-30
Payer: MEDICAID

## 2018-11-30 PROCEDURE — 73130 X-RAY EXAM OF HAND: CPT | Mod: RT

## 2018-11-30 PROCEDURE — 99024 POSTOP FOLLOW-UP VISIT: CPT

## 2018-12-28 PROBLEM — S62.324A CLOSED DISPLACED FRACTURE OF SHAFT OF FOURTH METACARPAL BONE OF RIGHT HAND, INITIAL ENCOUNTER: Status: ACTIVE | Noted: 2018-10-24

## 2018-12-31 ENCOUNTER — APPOINTMENT (OUTPATIENT)
Dept: ORTHOPEDIC SURGERY | Facility: CLINIC | Age: 28
End: 2018-12-31
Payer: MEDICAID

## 2018-12-31 DIAGNOSIS — S62.324A DISPLACED FRACTURE OF SHAFT OF FOURTH METACARPAL BONE, RIGHT HAND, INITIAL ENCOUNTER FOR CLOSED FRACTURE: ICD-10-CM

## 2018-12-31 PROCEDURE — 99024 POSTOP FOLLOW-UP VISIT: CPT

## 2018-12-31 PROCEDURE — 73130 X-RAY EXAM OF HAND: CPT | Mod: RT

## 2019-06-09 ENCOUNTER — EMERGENCY (EMERGENCY)
Facility: HOSPITAL | Age: 29
LOS: 1 days | Discharge: HOME | End: 2019-06-09
Attending: EMERGENCY MEDICINE
Payer: MEDICAID

## 2019-06-09 VITALS
OXYGEN SATURATION: 99 % | TEMPERATURE: 98 F | HEART RATE: 90 BPM | RESPIRATION RATE: 16 BRPM | WEIGHT: 145.95 LBS | SYSTOLIC BLOOD PRESSURE: 132 MMHG | HEIGHT: 58 IN | DIASTOLIC BLOOD PRESSURE: 82 MMHG

## 2019-06-09 DIAGNOSIS — Z87.42 PERSONAL HISTORY OF OTHER DISEASES OF THE FEMALE GENITAL TRACT: Chronic | ICD-10-CM

## 2019-06-09 DIAGNOSIS — Z98.89 OTHER SPECIFIED POSTPROCEDURAL STATES: Chronic | ICD-10-CM

## 2019-06-09 LAB
ALBUMIN SERPL ELPH-MCNC: 3.6 G/DL — SIGNIFICANT CHANGE UP (ref 3.5–5)
ALP SERPL-CCNC: 72 U/L — SIGNIFICANT CHANGE UP (ref 40–120)
ALT FLD-CCNC: 17 U/L DA — SIGNIFICANT CHANGE UP (ref 10–60)
ANION GAP SERPL CALC-SCNC: 5 MMOL/L — SIGNIFICANT CHANGE UP (ref 5–17)
APPEARANCE UR: CLEAR — SIGNIFICANT CHANGE UP
AST SERPL-CCNC: 12 U/L — SIGNIFICANT CHANGE UP (ref 10–40)
BASOPHILS # BLD AUTO: 0.04 K/UL — SIGNIFICANT CHANGE UP (ref 0–0.2)
BASOPHILS NFR BLD AUTO: 0.4 % — SIGNIFICANT CHANGE UP (ref 0–2)
BILIRUB SERPL-MCNC: 0.2 MG/DL — SIGNIFICANT CHANGE UP (ref 0.2–1.2)
BILIRUB UR-MCNC: NEGATIVE — SIGNIFICANT CHANGE UP
BUN SERPL-MCNC: 10 MG/DL — SIGNIFICANT CHANGE UP (ref 7–18)
CALCIUM SERPL-MCNC: 8.7 MG/DL — SIGNIFICANT CHANGE UP (ref 8.4–10.5)
CHLORIDE SERPL-SCNC: 108 MMOL/L — SIGNIFICANT CHANGE UP (ref 96–108)
CO2 SERPL-SCNC: 26 MMOL/L — SIGNIFICANT CHANGE UP (ref 22–31)
COLOR SPEC: YELLOW — SIGNIFICANT CHANGE UP
CREAT SERPL-MCNC: 0.78 MG/DL — SIGNIFICANT CHANGE UP (ref 0.5–1.3)
DIFF PNL FLD: ABNORMAL
EOSINOPHIL # BLD AUTO: 0.49 K/UL — SIGNIFICANT CHANGE UP (ref 0–0.5)
EOSINOPHIL NFR BLD AUTO: 5.5 % — SIGNIFICANT CHANGE UP (ref 0–6)
GLUCOSE SERPL-MCNC: 109 MG/DL — HIGH (ref 70–99)
GLUCOSE UR QL: NEGATIVE — SIGNIFICANT CHANGE UP
HCG SERPL-ACNC: <1 MIU/ML — SIGNIFICANT CHANGE UP
HCT VFR BLD CALC: 37.3 % — SIGNIFICANT CHANGE UP (ref 34.5–45)
HGB BLD-MCNC: 12.1 G/DL — SIGNIFICANT CHANGE UP (ref 11.5–15.5)
IMM GRANULOCYTES NFR BLD AUTO: 0.3 % — SIGNIFICANT CHANGE UP (ref 0–1.5)
KETONES UR-MCNC: NEGATIVE — SIGNIFICANT CHANGE UP
LEUKOCYTE ESTERASE UR-ACNC: ABNORMAL
LIDOCAIN IGE QN: 138 U/L — SIGNIFICANT CHANGE UP (ref 73–393)
LYMPHOCYTES # BLD AUTO: 3.05 K/UL — SIGNIFICANT CHANGE UP (ref 1–3.3)
LYMPHOCYTES # BLD AUTO: 34.2 % — SIGNIFICANT CHANGE UP (ref 13–44)
MCHC RBC-ENTMCNC: 31 PG — SIGNIFICANT CHANGE UP (ref 27–34)
MCHC RBC-ENTMCNC: 32.4 GM/DL — SIGNIFICANT CHANGE UP (ref 32–36)
MCV RBC AUTO: 95.6 FL — SIGNIFICANT CHANGE UP (ref 80–100)
MONOCYTES # BLD AUTO: 0.83 K/UL — SIGNIFICANT CHANGE UP (ref 0–0.9)
MONOCYTES NFR BLD AUTO: 9.3 % — SIGNIFICANT CHANGE UP (ref 2–14)
NEUTROPHILS # BLD AUTO: 4.48 K/UL — SIGNIFICANT CHANGE UP (ref 1.8–7.4)
NEUTROPHILS NFR BLD AUTO: 50.3 % — SIGNIFICANT CHANGE UP (ref 43–77)
NITRITE UR-MCNC: NEGATIVE — SIGNIFICANT CHANGE UP
NRBC # BLD: 0 /100 WBCS — SIGNIFICANT CHANGE UP (ref 0–0)
PH UR: 6.5 — SIGNIFICANT CHANGE UP (ref 5–8)
PLATELET # BLD AUTO: 308 K/UL — SIGNIFICANT CHANGE UP (ref 150–400)
POTASSIUM SERPL-MCNC: 4.3 MMOL/L — SIGNIFICANT CHANGE UP (ref 3.5–5.3)
POTASSIUM SERPL-SCNC: 4.3 MMOL/L — SIGNIFICANT CHANGE UP (ref 3.5–5.3)
PROT SERPL-MCNC: 8 G/DL — SIGNIFICANT CHANGE UP (ref 6–8.3)
PROT UR-MCNC: NEGATIVE — SIGNIFICANT CHANGE UP
RBC # BLD: 3.9 M/UL — SIGNIFICANT CHANGE UP (ref 3.8–5.2)
RBC # FLD: 12.6 % — SIGNIFICANT CHANGE UP (ref 10.3–14.5)
SODIUM SERPL-SCNC: 139 MMOL/L — SIGNIFICANT CHANGE UP (ref 135–145)
SP GR SPEC: 1 — LOW (ref 1.01–1.02)
UROBILINOGEN FLD QL: NEGATIVE — SIGNIFICANT CHANGE UP
WBC # BLD: 8.92 K/UL — SIGNIFICANT CHANGE UP (ref 3.8–10.5)
WBC # FLD AUTO: 8.92 K/UL — SIGNIFICANT CHANGE UP (ref 3.8–10.5)

## 2019-06-09 PROCEDURE — 99285 EMERGENCY DEPT VISIT HI MDM: CPT | Mod: 25

## 2019-06-09 RX ORDER — MORPHINE SULFATE 50 MG/1
2 CAPSULE, EXTENDED RELEASE ORAL ONCE
Refills: 0 | Status: DISCONTINUED | OUTPATIENT
Start: 2019-06-09 | End: 2019-06-09

## 2019-06-09 RX ORDER — IOHEXOL 300 MG/ML
30 INJECTION, SOLUTION INTRAVENOUS ONCE
Refills: 0 | Status: COMPLETED | OUTPATIENT
Start: 2019-06-09 | End: 2019-06-09

## 2019-06-09 RX ORDER — ONDANSETRON 8 MG/1
4 TABLET, FILM COATED ORAL ONCE
Refills: 0 | Status: COMPLETED | OUTPATIENT
Start: 2019-06-09 | End: 2019-06-09

## 2019-06-09 RX ORDER — CEFTRIAXONE 500 MG/1
1 INJECTION, POWDER, FOR SOLUTION INTRAMUSCULAR; INTRAVENOUS ONCE
Refills: 0 | Status: COMPLETED | OUTPATIENT
Start: 2019-06-09 | End: 2019-06-09

## 2019-06-09 RX ADMIN — MORPHINE SULFATE 2 MILLIGRAM(S): 50 CAPSULE, EXTENDED RELEASE ORAL at 23:26

## 2019-06-09 RX ADMIN — ONDANSETRON 4 MILLIGRAM(S): 8 TABLET, FILM COATED ORAL at 23:26

## 2019-06-09 RX ADMIN — CEFTRIAXONE 100 GRAM(S): 500 INJECTION, POWDER, FOR SOLUTION INTRAMUSCULAR; INTRAVENOUS at 23:27

## 2019-06-09 RX ADMIN — IOHEXOL 30 MILLILITER(S): 300 INJECTION, SOLUTION INTRAVENOUS at 23:27

## 2019-06-09 NOTE — ED PROVIDER NOTE - CLINICAL SUMMARY MEDICAL DECISION MAKING FREE TEXT BOX
29 y/o F patient with history of Umbilical Hernial repair presents to the ED w/ mid abdominal pain associated w/ nausea.  Will get labs, UA, and CT Abd. Will give morphine for pain and reassess. 27 y/o F patient with history of Umbilical Hernial repair presents to the ED w/ mid abdominal pain associated w/ nausea.  Will get labs, UA, and CT Abd. Will give morphine for pain and reassess.    labs unremarkable, UA cw UTI-given ceftriaxone,   CT shows 1.  No CT evidence of bowel obstruction, active inflammatory process or intra-abdominal source for infection.2.  Hepatomegaly.3.  Approximately 1.5 x 1 cm uterine fibroid uterine fibroid.4.  Sclerosis and subchondral lucencies in the sacroiliac joints bilaterally.  The imaging appearance is atypical for osteitis condensans ilii.  Sacroiliitis should be excluded clinically.  MRI of the sacroiliac joints may be obtained as clinically warranted..    Discussed above with patient. On reeval patient reports improvement of pain. Pt stable for discharge to f/u with PMD/GI as outpatient.

## 2019-06-09 NOTE — ED PROVIDER NOTE - NSFOLLOWUPINSTRUCTIONS_ED_ALL_ED_FT
Take antibiotic as prescribed. Followup with PMD for reevaluation.    Return to ED if you develop fever >100.8F, vomiting or worsening pain.

## 2019-06-09 NOTE — ED PROVIDER NOTE - OBJECTIVE STATEMENT
27 y/o F patient w/ PMHx of Umbilical hernial repair x2 times and ovarian cyst presents to the ED w/ mid-abd pain associated w/ difficult bowel movements for the last couple of days. Patient states this evening she had a very small bowel movement, then an hour later she experienced severe abd pain associated w/ nausea. Patient denies any new vaginal discharge, fever, dysuria, or any other acute complaints. LMP was at the end of May. Allergies: Nuts: Anaphylaxis, mouth edema, and "itchy throat", Reglan: Hives, Rash, and pruritic rash, Fruits(Pineapple, Pears, Cherries, Kiwi, and Apple): Mouth edema and "itchy throat"

## 2019-06-10 VITALS
HEART RATE: 89 BPM | RESPIRATION RATE: 18 BRPM | SYSTOLIC BLOOD PRESSURE: 121 MMHG | DIASTOLIC BLOOD PRESSURE: 66 MMHG | TEMPERATURE: 98 F | OXYGEN SATURATION: 100 %

## 2019-06-10 PROCEDURE — 85027 COMPLETE CBC AUTOMATED: CPT

## 2019-06-10 PROCEDURE — 96374 THER/PROPH/DIAG INJ IV PUSH: CPT | Mod: XU

## 2019-06-10 PROCEDURE — 74177 CT ABD & PELVIS W/CONTRAST: CPT | Mod: 26

## 2019-06-10 PROCEDURE — 80053 COMPREHEN METABOLIC PANEL: CPT

## 2019-06-10 PROCEDURE — 96375 TX/PRO/DX INJ NEW DRUG ADDON: CPT

## 2019-06-10 PROCEDURE — 84702 CHORIONIC GONADOTROPIN TEST: CPT

## 2019-06-10 PROCEDURE — 87086 URINE CULTURE/COLONY COUNT: CPT

## 2019-06-10 PROCEDURE — 99285 EMERGENCY DEPT VISIT HI MDM: CPT | Mod: 25

## 2019-06-10 PROCEDURE — 81001 URINALYSIS AUTO W/SCOPE: CPT

## 2019-06-10 PROCEDURE — 83690 ASSAY OF LIPASE: CPT

## 2019-06-10 PROCEDURE — 36415 COLL VENOUS BLD VENIPUNCTURE: CPT

## 2019-06-10 PROCEDURE — 74177 CT ABD & PELVIS W/CONTRAST: CPT

## 2019-06-10 RX ORDER — CEFDINIR 250 MG/5ML
1 POWDER, FOR SUSPENSION ORAL
Qty: 14 | Refills: 0
Start: 2019-06-10 | End: 2019-06-16

## 2019-06-11 LAB
CULTURE RESULTS: NO GROWTH — SIGNIFICANT CHANGE UP
SPECIMEN SOURCE: SIGNIFICANT CHANGE UP

## 2019-06-12 NOTE — ED PROVIDER NOTE - GRAVIDA
Detail Level: Detailed Quality 110: Preventive Care And Screening: Influenza Immunization: Influenza Immunization Ordered or Recommended, but not Administered due to system reason 1

## 2019-07-16 NOTE — PATIENT PROFILE OB - BABY A: MECONIUM PRESENT, DELIVERY
Patient:   JESSICA JEFFERS            MRN: [id number]            FIN: [id number]              Age:   75 years     Sex:  FEMALE     :  45   Associated Diagnoses:   None   Author:   KRANTHI ADAIR     CARDIOLOGY    Reason for Consult: Clincal Decision Unit (CDU) Chest Pain Pathway    HPI: This is a patient who presents from the ED with atypical CP and admitted to the Chest Pain Pathway.  The symptoms are not c/w classic angina or unstable angina.  The symptoms are also not characteristic of PE or Aortic Dissection.  The patient has subsequently ruled out for MI with negative trops and EKGs.  Tele reviewed as well.  There are enough concerning features/RF to warrant a stress test.  Stress Testing has been ordered.   Currently, this patient denies chest pressure, SOB, NOVOA, palp, light headedness.      ROS: 12 systems reviewed and negative except as above    PMH/PSH: please see Internal Medicine H&P    SH:  negative for Etoh  negative for Tob  negative for IVDA    FH:  negative for premature CAD  negative for SCD    ALL:  see EMR    Cardiac Meds: see MAR for full list  Medications (18) Active  Scheduled: (13)  Anastrozole 1 mg tab  1 mg 1 tab, Oral, Daily  Aspirin 81 mg DR tab  81 mg 1 tab, Oral, Daily  Atorvastatin 40 mg tab  40 mg 1 tab, Oral, Daily  Calcium-vitamin D 500 mg-200 unit tab [oyster shell 1,250 mg]  1 tab, Oral, BID  Enalapril 20 mg tab  20 mg 1 tab, Oral, Daily  Enoxaparin 40 mg/0.4 mL syringe  40 mg 0.4 mL, Subcutaneous, Daily  Gabapentin 100 mg cap  100 mg 1 cap, Oral, BID [with breakfast & lunch]  insulin glargine  43 unit 0.43 mL, Subcutaneous, Q Evening  Insulin human lispro 10 unit/0.1 mL inj  13 unit 0.13 mL, Subcutaneous, BID [before breakfast & dinner]  Insulin human lispro 10 unit/0.1 mL inj  1-6 units, Subcutaneous, QID [with meals & HS]  Levothyroxine 125 mcg tab  62.5 mcg 0.5 tab, Oral, Daily  NIFEdipine 30 mg XL tab  30 mg 1 tab, Oral, Daily  Pantoprazole 40 mg DR tab  40 mg  1 tab, Oral, Daily  Continuous: (0)  PRN: (5)  Acetaminophen 325 mg tab  650 mg 2 tab, Oral, Q4H  Dextrose (glucose) 40% 15 gm/37.5 gm oral gel UD  15 gm, Oral, As Directed PRN  Dextrose (glucose) 50% 25 gm/50 mL syringe  12.5 gm 25 mL, IV Push, As Directed PRN  Glucagon 1 mg/1 mL emergency kit SDV  1 mg 1 mL, IM, As Directed PRN  Senna-docusate sodium 8.6-50 mg tab  2 tab, Oral, Q Bedtime      Vitals:  Vitals between:   15-JUL-2019 14:20:56   TO   16-JUL-2019 14:20:56                   LAST RESULT MINIMUM MAXIMUM  Temperature 36.4 36.4 36.6  Heart Rate 64 64 78  Respiratory Rate 16 16 20  NISBP           137 124 160  NIDBP           71 63 71  NIMBP           92 83 99  SpO2                    99 97 100    Tele:no sig events  Gen: AAOx3, NAD  Eyes: nl sclera  Neck: no JVD, nl carotids, no bruit  Neuro: no focal deficits  Cards: RRR, nl S1S2, no M/G  Pulm: CTAB, no W/C  Abd: soft, NT/ND, nabs  Ext: no C/C/E, 2+ pulses  Skin: no lesions    Labs: reviewed    Labs between:  15-JUL-2019 14:21 to 16-JUL-2019 14:21    CBC:                 WBC  HgB  Hct  Plt  MCV  RDW   16-JUL-2019 5.3  12.3  37.1  293  83.2  13.7     DIFF:                 Seg  Neutroph//ABS  Lymph//ABS  Mono//ABS  EOS/ABS  16-JUL-2019 NOT APPLICABLE  53 // 2.8 33 // 1.7 8 // 0.4 5 // 0.2    BMP:                 Na  Cl  BUN  Glu   16-JUL-2019 144  105  14  (H) 159                              K  CO2  Cr  Ca                              3.5  28  0.77  9.2     POC GLU:                 Latest Result  Latest Date  Minimum  Min Date  Maximum  Max Date                             (H) 161  16-JUL-2019 (H) 161  16-JUL-2019 (H) 363  15-JUL-2019                   Trop: neg x 2    EKG: no ischemia    IMPRESSION:    Atypical CP: stress test has been performed in Echo lab  HTN  HL  DM  h/o thyroid cancer  h/o breast cancer  h/o cervical cancer    RECS:    No further inpatient CV w/u indicated if stress test is negative  Pt to f/u with outpatient PCP for risk factor  modification    -Discussed with team, patient and nursing staff  -Thank you   no

## 2019-10-28 NOTE — ED PROVIDER NOTE - TIMING
Was the condition present on admission, if so, please document in the chart that “(the condition) was present on admission.”
none

## 2019-11-08 ENCOUNTER — EMERGENCY (EMERGENCY)
Facility: HOSPITAL | Age: 29
LOS: 1 days | Discharge: HOME | End: 2019-11-08
Attending: STUDENT IN AN ORGANIZED HEALTH CARE EDUCATION/TRAINING PROGRAM
Payer: MEDICAID

## 2019-11-08 VITALS
SYSTOLIC BLOOD PRESSURE: 117 MMHG | HEART RATE: 84 BPM | HEIGHT: 58 IN | TEMPERATURE: 98 F | OXYGEN SATURATION: 98 % | WEIGHT: 136.91 LBS | RESPIRATION RATE: 16 BRPM | DIASTOLIC BLOOD PRESSURE: 78 MMHG

## 2019-11-08 DIAGNOSIS — Z98.89 OTHER SPECIFIED POSTPROCEDURAL STATES: Chronic | ICD-10-CM

## 2019-11-08 DIAGNOSIS — Z87.42 PERSONAL HISTORY OF OTHER DISEASES OF THE FEMALE GENITAL TRACT: Chronic | ICD-10-CM

## 2019-11-08 PROCEDURE — 94640 AIRWAY INHALATION TREATMENT: CPT

## 2019-11-08 PROCEDURE — 99284 EMERGENCY DEPT VISIT MOD MDM: CPT

## 2019-11-08 PROCEDURE — 99284 EMERGENCY DEPT VISIT MOD MDM: CPT | Mod: 25

## 2019-11-08 PROCEDURE — 71046 X-RAY EXAM CHEST 2 VIEWS: CPT

## 2019-11-08 PROCEDURE — 71046 X-RAY EXAM CHEST 2 VIEWS: CPT | Mod: 26

## 2019-11-08 RX ORDER — LEVALBUTEROL 1.25 MG/.5ML
2 SOLUTION, CONCENTRATE RESPIRATORY (INHALATION)
Qty: 1 | Refills: 0
Start: 2019-11-08

## 2019-11-08 RX ORDER — IPRATROPIUM/ALBUTEROL SULFATE 18-103MCG
6 AEROSOL WITH ADAPTER (GRAM) INHALATION ONCE
Refills: 0 | Status: COMPLETED | OUTPATIENT
Start: 2019-11-08 | End: 2019-11-08

## 2019-11-08 RX ORDER — LEVALBUTEROL 1.25 MG/.5ML
3 SOLUTION, CONCENTRATE RESPIRATORY (INHALATION)
Qty: 60 | Refills: 0
Start: 2019-11-08

## 2019-11-08 RX ADMIN — Medication 6 MILLILITER(S): at 11:26

## 2019-11-08 RX ADMIN — Medication 60 MILLIGRAM(S): at 11:27

## 2019-11-08 NOTE — ED PROVIDER NOTE - PROGRESS NOTE DETAILS
patient lung sounds improved. no longer wheezing, endorses feeling better. return precaution instructed. given med, instructed to f.u pmd

## 2019-11-08 NOTE — ED PROVIDER NOTE - PATIENT PORTAL LINK FT
You can access the FollowMyHealth Patient Portal offered by [facility] by registering at the following website: [email or web address]. By joining [employer]’s FollowMyHealth portal, you will also be able to view your health information using other applications (apps) compatible with our system.

## 2019-11-08 NOTE — ED PROVIDER NOTE - CLINICAL SUMMARY MEDICAL DECISION MAKING FREE TEXT BOX
Patient presenting with sob, history of asthma. will obtain xray, r/o pna. treat for asthma, ed obs and reassess. speaking full sentence, vital stable. perc -. will observe and reassess

## 2019-11-08 NOTE — ED ADULT NURSE NOTE - OBJECTIVE STATEMENT
pt is here for cold symptoms.  pt stated that cold with asthma attacks, albuterol puff not helping for 3 days, c/o pain 4-6/10, denied sob, denied fever or chills, pt calm at this time.

## 2019-11-08 NOTE — ED PROVIDER NOTE - OBJECTIVE STATEMENT
29 y.o w/ pmh of presenting with cough and sob x 2 day. denies n, v, fever, recent travel, calf pain/swelling. endorses that she uses xopenex but ran out.

## 2020-01-01 ENCOUNTER — EMERGENCY (EMERGENCY)
Facility: HOSPITAL | Age: 30
LOS: 1 days | Discharge: HOME | End: 2020-01-01
Attending: EMERGENCY MEDICINE
Payer: MEDICAID

## 2020-01-01 VITALS
HEART RATE: 120 BPM | SYSTOLIC BLOOD PRESSURE: 124 MMHG | OXYGEN SATURATION: 98 % | TEMPERATURE: 98 F | WEIGHT: 139.99 LBS | RESPIRATION RATE: 24 BRPM | DIASTOLIC BLOOD PRESSURE: 74 MMHG

## 2020-01-01 VITALS — HEART RATE: 98 BPM | DIASTOLIC BLOOD PRESSURE: 60 MMHG | SYSTOLIC BLOOD PRESSURE: 102 MMHG

## 2020-01-01 DIAGNOSIS — Z87.42 PERSONAL HISTORY OF OTHER DISEASES OF THE FEMALE GENITAL TRACT: Chronic | ICD-10-CM

## 2020-01-01 DIAGNOSIS — Z98.89 OTHER SPECIFIED POSTPROCEDURAL STATES: Chronic | ICD-10-CM

## 2020-01-01 PROCEDURE — 71046 X-RAY EXAM CHEST 2 VIEWS: CPT | Mod: 26

## 2020-01-01 PROCEDURE — 99283 EMERGENCY DEPT VISIT LOW MDM: CPT

## 2020-01-01 PROCEDURE — 94640 AIRWAY INHALATION TREATMENT: CPT

## 2020-01-01 PROCEDURE — 99283 EMERGENCY DEPT VISIT LOW MDM: CPT | Mod: 25

## 2020-01-01 PROCEDURE — 71046 X-RAY EXAM CHEST 2 VIEWS: CPT

## 2020-01-01 RX ORDER — IPRATROPIUM/ALBUTEROL SULFATE 18-103MCG
3 AEROSOL WITH ADAPTER (GRAM) INHALATION ONCE
Refills: 0 | Status: COMPLETED | OUTPATIENT
Start: 2020-01-01 | End: 2020-01-01

## 2020-01-01 RX ORDER — LEVALBUTEROL 1.25 MG/.5ML
3 SOLUTION, CONCENTRATE RESPIRATORY (INHALATION)
Qty: 90 | Refills: 0
Start: 2020-01-01 | End: 2020-01-10

## 2020-01-01 RX ORDER — ACETAMINOPHEN 500 MG
975 TABLET ORAL ONCE
Refills: 0 | Status: COMPLETED | OUTPATIENT
Start: 2020-01-01 | End: 2020-01-01

## 2020-01-01 RX ORDER — IBUPROFEN 200 MG
1 TABLET ORAL
Qty: 30 | Refills: 0
Start: 2020-01-01 | End: 2020-01-10

## 2020-01-01 RX ADMIN — Medication 60 MILLIGRAM(S): at 10:40

## 2020-01-01 RX ADMIN — Medication 3 MILLILITER(S): at 10:39

## 2020-01-01 RX ADMIN — Medication 975 MILLIGRAM(S): at 10:39

## 2020-01-01 RX ADMIN — Medication 975 MILLIGRAM(S): at 11:10

## 2020-01-01 NOTE — ED ADULT TRIAGE NOTE - CHIEF COMPLAINT QUOTE
as per the pt " I woke up this morning with difficulty in breathing " pt has the h/o asthma peak flow 170

## 2020-01-01 NOTE — ED PROVIDER NOTE - OBJECTIVE STATEMENT
30 y/o F with a significant PMHx of asthma and a significant PSHx of appendectomy presents to the ED with complaints of shortness of breath, ear pain and L upper back pain x 1 week. Patient states she went to an urgent care and was prescribed Mucinex. Patient states she was feeling better, however developed a cough yesterday. Patient states back pain is worse when coughing and deep breathes. Denies fever, body aches, chance of pregnancy or any other acute complaints.

## 2020-01-01 NOTE — ED PROVIDER NOTE - CLINICAL SUMMARY MEDICAL DECISION MAKING FREE TEXT BOX
30 y/o F presents with flu like symptoms, cough and shortness of breath x 1 week, here with daughter and granddaughter with similar symptoms. Will treat and get X-ray to rule out PNA.

## 2020-01-01 NOTE — ED PROVIDER NOTE - CARE PLAN
Principal Discharge DX:	Influenza  Secondary Diagnosis:	Exacerbation of asthma, unspecified asthma severity, unspecified whether persistent

## 2020-01-01 NOTE — ED ADULT NURSE NOTE - CHPI ED NUR CONTEXT2
Operative Note


Operative Note


Operative Note:





Preoperative Diagnosis:  Right inguinal hernia





Postoperative Diagnosis: Same





Procedure: Right inguinal hernia repair with mesh





Surgeon: Houston





Assistant: Jessica RAMOS





Anesthesia: Gen.





EBL: 10 mL





Specimen: None





Drains: None





Complications: None





Indication: The patient is an 83-year-old male who is referred due to a right 

inguinal hernia. He was offered surgical repair. The risks of surgery were 

discussed which include bleeding, infection, recurrence, pain, anesthetic risk, 

potential need for additional surgery or procedure. He understands and would 

like to proceed.





Description:  The patient was taken to the operating room and placed supine on 

the operating table. Gen. anesthesia was performed. The right groin was shaved 

and prepped with ChloraPrep and draped in a standard surgical manner. An 

incision was made in the right groin with a scalpel. Cautery dissection was 

carried down to the external oblique aponeurosis. The aponeurosis was opened 

down to the external ring. The contents of the inguinal canal were digitally 

mobilized and encircled with a Penrose drain. The vas deferens and other cord 

structures were identified and preserved. The patient had a fairly large direct 

hernia defect. There was no evidence of an indirect hernia sac. We mobilized 

the edges of the hernia defect circumferentially. The attenuated transversalis 

fascia was opened exposing the preperitoneal fat. An extra-large Phasix mesh 

plug was then used to fill this defect. The plug was secured around its 

periphery with 2-0 Vicryl. The entire inguinal floor was then reinforced with a 

keyhole Prolene mesh patch. The patch was secured inferiorly to the shelving 

edge of the inguinal ligament and superiorly to the internal oblique muscle and 

aponeurosis. A slit was made to accommodate the cord structures. Upon 

completion the plug remained well intact with the onlay mesh patch providing 

full coverage with overlap of the hernia defect. The external oblique was 

closed over the mesh with 2-0 Vicryl. The subcutaneous tissues approximated 

with 3-0 Vicryl. The skin was closed with 4-0 Monocryl. The incision was 

infiltrated with half percent Marcaine with epinephrine. Steri-Strips and a 

sterile dressing were applied. The patient tolerated the procedure well and was 

sent to the recovery room in stable condition. At the end of the case all 

counts were correct.











OTIS RODNEY MD Apr 8, 2019 11:27 unknown

## 2020-01-01 NOTE — ED PROVIDER NOTE - PATIENT PORTAL LINK FT
You can access the FollowMyHealth Patient Portal offered by [facility] by registering at the following website: [email or web address]. By joining [employer]’s FollowMyHealth portal, you will also be able to view your health information using other applications (apps) compatible with our system.

## 2020-01-01 NOTE — ED PROVIDER NOTE - PROGRESS NOTE DETAILS
Pt improved, lungs cta, pt is comfortable, in no distress, will dc to follow up with pmd. Precautions reviewed.

## 2020-01-01 NOTE — ED PROVIDER NOTE - MUSCULOSKELETAL, MLM
Spine appears normal, range of motion is not limited, no muscle or joint tenderness. No edema, no calf tenderness.

## 2020-08-15 ENCOUNTER — EMERGENCY (EMERGENCY)
Facility: HOSPITAL | Age: 30
LOS: 1 days | Discharge: HOME | End: 2020-08-15
Attending: EMERGENCY MEDICINE | Admitting: EMERGENCY MEDICINE
Payer: MEDICAID

## 2020-08-15 VITALS
HEART RATE: 80 BPM | SYSTOLIC BLOOD PRESSURE: 119 MMHG | OXYGEN SATURATION: 100 % | TEMPERATURE: 98 F | RESPIRATION RATE: 18 BRPM | DIASTOLIC BLOOD PRESSURE: 65 MMHG

## 2020-08-15 VITALS
RESPIRATION RATE: 16 BRPM | SYSTOLIC BLOOD PRESSURE: 138 MMHG | HEART RATE: 89 BPM | DIASTOLIC BLOOD PRESSURE: 83 MMHG | TEMPERATURE: 98 F | OXYGEN SATURATION: 100 %

## 2020-08-15 DIAGNOSIS — Z87.42 PERSONAL HISTORY OF OTHER DISEASES OF THE FEMALE GENITAL TRACT: Chronic | ICD-10-CM

## 2020-08-15 DIAGNOSIS — Z98.89 OTHER SPECIFIED POSTPROCEDURAL STATES: Chronic | ICD-10-CM

## 2020-08-15 LAB
ALBUMIN SERPL ELPH-MCNC: 4.6 G/DL — SIGNIFICANT CHANGE UP (ref 3.3–5)
ALP SERPL-CCNC: 59 U/L — SIGNIFICANT CHANGE UP (ref 40–120)
ALT FLD-CCNC: 16 U/L — SIGNIFICANT CHANGE UP (ref 4–33)
ANION GAP SERPL CALC-SCNC: 13 MMO/L — SIGNIFICANT CHANGE UP (ref 7–14)
APPEARANCE UR: CLEAR — SIGNIFICANT CHANGE UP
AST SERPL-CCNC: 16 U/L — SIGNIFICANT CHANGE UP (ref 4–32)
BACTERIA # UR AUTO: SIGNIFICANT CHANGE UP
BASE EXCESS BLDV CALC-SCNC: 1.8 MMOL/L — SIGNIFICANT CHANGE UP
BASOPHILS # BLD AUTO: 0.04 K/UL — SIGNIFICANT CHANGE UP (ref 0–0.2)
BASOPHILS # BLD AUTO: 0.06 K/UL — SIGNIFICANT CHANGE UP (ref 0–0.2)
BASOPHILS NFR BLD AUTO: 0.5 % — SIGNIFICANT CHANGE UP (ref 0–2)
BASOPHILS NFR BLD AUTO: 0.7 % — SIGNIFICANT CHANGE UP (ref 0–2)
BILIRUB SERPL-MCNC: 0.2 MG/DL — SIGNIFICANT CHANGE UP (ref 0.2–1.2)
BILIRUB UR-MCNC: NEGATIVE — SIGNIFICANT CHANGE UP
BLOOD GAS VENOUS - CREATININE: 0.64 MG/DL — SIGNIFICANT CHANGE UP (ref 0.5–1.3)
BLOOD UR QL VISUAL: SIGNIFICANT CHANGE UP
BUN SERPL-MCNC: 13 MG/DL — SIGNIFICANT CHANGE UP (ref 7–23)
CALCIUM SERPL-MCNC: 9.4 MG/DL — SIGNIFICANT CHANGE UP (ref 8.4–10.5)
CHLORIDE BLDV-SCNC: 107 MMOL/L — SIGNIFICANT CHANGE UP (ref 96–108)
CHLORIDE SERPL-SCNC: 103 MMOL/L — SIGNIFICANT CHANGE UP (ref 98–107)
CO2 SERPL-SCNC: 22 MMOL/L — SIGNIFICANT CHANGE UP (ref 22–31)
COLOR SPEC: SIGNIFICANT CHANGE UP
CREAT SERPL-MCNC: 0.59 MG/DL — SIGNIFICANT CHANGE UP (ref 0.5–1.3)
EOSINOPHIL # BLD AUTO: 0.43 K/UL — SIGNIFICANT CHANGE UP (ref 0–0.5)
EOSINOPHIL # BLD AUTO: 0.44 K/UL — SIGNIFICANT CHANGE UP (ref 0–0.5)
EOSINOPHIL NFR BLD AUTO: 4.8 % — SIGNIFICANT CHANGE UP (ref 0–6)
EOSINOPHIL NFR BLD AUTO: 4.9 % — SIGNIFICANT CHANGE UP (ref 0–6)
GAS PNL BLDV: 138 MMOL/L — SIGNIFICANT CHANGE UP (ref 136–146)
GLUCOSE BLDV-MCNC: 103 MG/DL — HIGH (ref 70–99)
GLUCOSE SERPL-MCNC: 102 MG/DL — HIGH (ref 70–99)
GLUCOSE UR-MCNC: NEGATIVE — SIGNIFICANT CHANGE UP
HCO3 BLDV-SCNC: 23 MMOL/L — SIGNIFICANT CHANGE UP (ref 20–27)
HCT VFR BLD CALC: 33.4 % — LOW (ref 34.5–45)
HCT VFR BLD CALC: 39.9 % — SIGNIFICANT CHANGE UP (ref 34.5–45)
HCT VFR BLDV CALC: 42.7 % — SIGNIFICANT CHANGE UP (ref 34.5–45)
HGB BLD-MCNC: 11.1 G/DL — LOW (ref 11.5–15.5)
HGB BLD-MCNC: 13.1 G/DL — SIGNIFICANT CHANGE UP (ref 11.5–15.5)
HGB BLDV-MCNC: 13.9 G/DL — SIGNIFICANT CHANGE UP (ref 11.5–15.5)
IMM GRANULOCYTES NFR BLD AUTO: 0.3 % — SIGNIFICANT CHANGE UP (ref 0–1.5)
IMM GRANULOCYTES NFR BLD AUTO: 0.3 % — SIGNIFICANT CHANGE UP (ref 0–1.5)
KETONES UR-MCNC: NEGATIVE — SIGNIFICANT CHANGE UP
LACTATE BLDV-MCNC: 1.9 MMOL/L — SIGNIFICANT CHANGE UP (ref 0.5–2)
LEUKOCYTE ESTERASE UR-ACNC: NEGATIVE — SIGNIFICANT CHANGE UP
LYMPHOCYTES # BLD AUTO: 2.88 K/UL — SIGNIFICANT CHANGE UP (ref 1–3.3)
LYMPHOCYTES # BLD AUTO: 2.89 K/UL — SIGNIFICANT CHANGE UP (ref 1–3.3)
LYMPHOCYTES # BLD AUTO: 31.6 % — SIGNIFICANT CHANGE UP (ref 13–44)
LYMPHOCYTES # BLD AUTO: 33.1 % — SIGNIFICANT CHANGE UP (ref 13–44)
MCHC RBC-ENTMCNC: 31.6 PG — SIGNIFICANT CHANGE UP (ref 27–34)
MCHC RBC-ENTMCNC: 31.9 PG — SIGNIFICANT CHANGE UP (ref 27–34)
MCHC RBC-ENTMCNC: 32.8 % — SIGNIFICANT CHANGE UP (ref 32–36)
MCHC RBC-ENTMCNC: 33.2 % — SIGNIFICANT CHANGE UP (ref 32–36)
MCV RBC AUTO: 96 FL — SIGNIFICANT CHANGE UP (ref 80–100)
MCV RBC AUTO: 96.4 FL — SIGNIFICANT CHANGE UP (ref 80–100)
MONOCYTES # BLD AUTO: 0.87 K/UL — SIGNIFICANT CHANGE UP (ref 0–0.9)
MONOCYTES # BLD AUTO: 0.89 K/UL — SIGNIFICANT CHANGE UP (ref 0–0.9)
MONOCYTES NFR BLD AUTO: 10 % — SIGNIFICANT CHANGE UP (ref 2–14)
MONOCYTES NFR BLD AUTO: 9.7 % — SIGNIFICANT CHANGE UP (ref 2–14)
NEUTROPHILS # BLD AUTO: 4.44 K/UL — SIGNIFICANT CHANGE UP (ref 1.8–7.4)
NEUTROPHILS # BLD AUTO: 4.83 K/UL — SIGNIFICANT CHANGE UP (ref 1.8–7.4)
NEUTROPHILS NFR BLD AUTO: 51.2 % — SIGNIFICANT CHANGE UP (ref 43–77)
NEUTROPHILS NFR BLD AUTO: 52.9 % — SIGNIFICANT CHANGE UP (ref 43–77)
NITRITE UR-MCNC: NEGATIVE — SIGNIFICANT CHANGE UP
NRBC # FLD: 0 K/UL — SIGNIFICANT CHANGE UP (ref 0–0)
NRBC # FLD: 0 K/UL — SIGNIFICANT CHANGE UP (ref 0–0)
PCO2 BLDV: 55 MMHG — HIGH (ref 41–51)
PH BLDV: 7.32 PH — SIGNIFICANT CHANGE UP (ref 7.32–7.43)
PH UR: 7 — SIGNIFICANT CHANGE UP (ref 5–8)
PLATELET # BLD AUTO: 228 K/UL — SIGNIFICANT CHANGE UP (ref 150–400)
PLATELET # BLD AUTO: 273 K/UL — SIGNIFICANT CHANGE UP (ref 150–400)
PMV BLD: 10.6 FL — SIGNIFICANT CHANGE UP (ref 7–13)
PMV BLD: 10.7 FL — SIGNIFICANT CHANGE UP (ref 7–13)
PO2 BLDV: < 24 MMHG — LOW (ref 35–40)
POTASSIUM BLDV-SCNC: 3.8 MMOL/L — SIGNIFICANT CHANGE UP (ref 3.4–4.5)
POTASSIUM SERPL-MCNC: 4.1 MMOL/L — SIGNIFICANT CHANGE UP (ref 3.5–5.3)
POTASSIUM SERPL-SCNC: 4.1 MMOL/L — SIGNIFICANT CHANGE UP (ref 3.5–5.3)
PROT SERPL-MCNC: 7.5 G/DL — SIGNIFICANT CHANGE UP (ref 6–8.3)
PROT UR-MCNC: NEGATIVE — SIGNIFICANT CHANGE UP
RBC # BLD: 3.48 M/UL — LOW (ref 3.8–5.2)
RBC # BLD: 4.14 M/UL — SIGNIFICANT CHANGE UP (ref 3.8–5.2)
RBC # FLD: 11.6 % — SIGNIFICANT CHANGE UP (ref 10.3–14.5)
RBC # FLD: 11.6 % — SIGNIFICANT CHANGE UP (ref 10.3–14.5)
RBC CASTS # UR COMP ASSIST: SIGNIFICANT CHANGE UP (ref 0–?)
SAO2 % BLDV: 25 % — LOW (ref 60–85)
SODIUM SERPL-SCNC: 138 MMOL/L — SIGNIFICANT CHANGE UP (ref 135–145)
SP GR SPEC: 1.02 — SIGNIFICANT CHANGE UP (ref 1–1.04)
UROBILINOGEN FLD QL: NORMAL — SIGNIFICANT CHANGE UP
WBC # BLD: 8.69 K/UL — SIGNIFICANT CHANGE UP (ref 3.8–10.5)
WBC # BLD: 9.14 K/UL — SIGNIFICANT CHANGE UP (ref 3.8–10.5)
WBC # FLD AUTO: 8.69 K/UL — SIGNIFICANT CHANGE UP (ref 3.8–10.5)
WBC # FLD AUTO: 9.14 K/UL — SIGNIFICANT CHANGE UP (ref 3.8–10.5)
WBC UR QL: SIGNIFICANT CHANGE UP (ref 0–?)

## 2020-08-15 PROCEDURE — 76830 TRANSVAGINAL US NON-OB: CPT | Mod: 26

## 2020-08-15 PROCEDURE — 99284 EMERGENCY DEPT VISIT MOD MDM: CPT

## 2020-08-15 RX ORDER — IBUPROFEN 200 MG
1 TABLET ORAL
Qty: 21 | Refills: 0
Start: 2020-08-15 | End: 2020-08-21

## 2020-08-15 RX ORDER — SODIUM CHLORIDE 9 MG/ML
1000 INJECTION INTRAMUSCULAR; INTRAVENOUS; SUBCUTANEOUS ONCE
Refills: 0 | Status: COMPLETED | OUTPATIENT
Start: 2020-08-15 | End: 2020-08-15

## 2020-08-15 RX ORDER — NITROFURANTOIN MACROCRYSTAL 50 MG
1 CAPSULE ORAL
Qty: 14 | Refills: 0
Start: 2020-08-15 | End: 2020-08-21

## 2020-08-15 RX ADMIN — SODIUM CHLORIDE 1000 MILLILITER(S): 9 INJECTION INTRAMUSCULAR; INTRAVENOUS; SUBCUTANEOUS at 20:21

## 2020-08-15 NOTE — ED PROVIDER NOTE - CLINICAL SUMMARY MEDICAL DECISION MAKING FREE TEXT BOX
29 year old female with PMH of Asthma and PCOS presents to the ED complaining of pelvic pain for 4 days with associated urinary symptoms, on day 4 Cefpodoxime without much improvement. HD stable. Imp: Pelvic pain with urinary symptoms, likely cystitis, r/o ovarian torsion vs ruptured cysts, no CVAT suggestive Pyelo. Plan for labs, UA, urine culture, TVUS, UCG, analgesics, reassess.

## 2020-08-15 NOTE — ED PROVIDER NOTE - PROGRESS NOTE DETAILS
Progress: Received sign out from JUANCHO Chong. Pt with 4.9cm hemorrhagic cyst, no concern for torsion at this time, pt is comfortable and did not receive any pain meds. Return precautions for torsion discussed. Pt also still complaining of dysuria, currently being treated with abx from UC, no available cultures. UA here clear but pt is currently on abx. WIll switch to macrobid and advised pt to f/u with ED for ucx results. Pt with drop in hgb but stable vitals and still not requiring pain meds. ENgaged in shared decision making with patient- option to repeat cbc again In 3 hours or to go home with return precautions. Pt comfortable going home and able to verbalize return precautions.

## 2020-08-15 NOTE — ED PROVIDER NOTE - NSFOLLOWUPINSTRUCTIONS_ED_ALL_ED_FT
Please take macrobid twice daily for the next 7 days. U/S demonstrated hemorrhagic cyst. Please follow up with your GYN for further management return to ER for worsening pain, new onset of fever, nausea/vomiting, vaginal bleed, lightheadedness, dizziness or weakness    1) Follow up with your provider or contact [phone] to assist with scheduling appointment with PCP/specialist for further care  2) Please continue to take any home medications as prescribed. Take tylenol 325 mg every 4 hours for pain relief/fever control or ibuprofen 600 mg every 6 hours for pain relief/fever control  3) Your test results from your ED visit were discussed with you prior to discharge  4) You were provided with a copy of your test results

## 2020-08-15 NOTE — ED ADULT NURSE NOTE - OBJECTIVE STATEMENT
received pt  in bed A and Ox 3  in NAD resting comfortably, c/o LLQ abd pain and burning on urination, denies back pain, fever, chills, abd soft non distended BS present 4 quadrants,  orders noted  and completed.

## 2020-08-15 NOTE — ED PROVIDER NOTE - PATIENT PORTAL LINK FT
You can access the FollowMyHealth Patient Portal offered by [facility] by registering at the following website: [email or web address]. By joining [employer]’s FollowMyHealth portal, you will also be able to view your health information using other applications (apps) compatible with our system.

## 2020-08-15 NOTE — ED ADULT TRIAGE NOTE - CHIEF COMPLAINT QUOTE
Pt c/o lower abd pain for 5 days with dysuria and urinary frequency. Went to Urgent Care on Wed and was dx with UTI, prescribed cefuroxime with no relief. Reports worsening pain. Denies n/v/d, fever, chills. LMP 7/28. PMHx asthma, PCOS

## 2020-08-15 NOTE — ED PROVIDER NOTE - OBJECTIVE STATEMENT
29 year old female with PMH of Asthma and PCOS presents to the ED complaining of pelvic pain for 4 days. Pt reports intermittent pelvic pain and associated dysuria and urinary frequency; states she was treated for a UTI after an urgent care with Cefpodoxime without much improvement. Pt denies associated nausea, vomiting, diarrhea, constipation, melena, hematochezia, fever and chills. Pt states she is not sexually active currently. Pt denies any other complaints. 29 year old female with PMH of Asthma and PCOS presents to the ED complaining of pelvic pain for 4 days. Pt reports constant pelvic pain worse with sitting and associated dysuria and urinary frequency; states she was treated for a UTI after an urgent care with Cefpodoxime without much improvement. Pt denies associated nausea, vomiting, diarrhea, constipation, melena, hematochezia, fever and chills. Pt states she is not sexually active currently. Pt denies any other complaints.

## 2020-08-15 NOTE — ED PROVIDER NOTE - ATTENDING CONTRIBUTION TO CARE
I performed a history and physical exam of the patient and discussed their management with the PA. I reviewed the PA's note and agree with the documented findings and plan of care. I have edited as appropriate. My medical decision making and observations are found above.  NAD, comfortable appearing, abd soft, minimally tender to palpation b/l lower quads

## 2020-08-17 ENCOUNTER — EMERGENCY (EMERGENCY)
Facility: HOSPITAL | Age: 30
LOS: 1 days | Discharge: HOME | End: 2020-08-17
Attending: EMERGENCY MEDICINE | Admitting: EMERGENCY MEDICINE
Payer: MEDICAID

## 2020-08-17 DIAGNOSIS — Z98.89 OTHER SPECIFIED POSTPROCEDURAL STATES: Chronic | ICD-10-CM

## 2020-08-17 DIAGNOSIS — Z87.42 PERSONAL HISTORY OF OTHER DISEASES OF THE FEMALE GENITAL TRACT: Chronic | ICD-10-CM

## 2020-08-17 LAB
CULTURE RESULTS: SIGNIFICANT CHANGE UP
SPECIMEN SOURCE: SIGNIFICANT CHANGE UP

## 2020-08-17 PROCEDURE — 99284 EMERGENCY DEPT VISIT MOD MDM: CPT

## 2020-08-18 VITALS
RESPIRATION RATE: 18 BRPM | TEMPERATURE: 98 F | DIASTOLIC BLOOD PRESSURE: 77 MMHG | OXYGEN SATURATION: 100 % | SYSTOLIC BLOOD PRESSURE: 117 MMHG | HEART RATE: 102 BPM

## 2020-08-18 VITALS
RESPIRATION RATE: 18 BRPM | HEART RATE: 80 BPM | SYSTOLIC BLOOD PRESSURE: 105 MMHG | DIASTOLIC BLOOD PRESSURE: 65 MMHG | OXYGEN SATURATION: 97 %

## 2020-08-18 DIAGNOSIS — N83.20 UNSPECIFIED OVARIAN CYSTS: ICD-10-CM

## 2020-08-18 LAB
ALBUMIN SERPL ELPH-MCNC: 4.4 G/DL — SIGNIFICANT CHANGE UP (ref 3.3–5)
ALP SERPL-CCNC: 57 U/L — SIGNIFICANT CHANGE UP (ref 40–120)
ALT FLD-CCNC: 15 U/L — SIGNIFICANT CHANGE UP (ref 4–33)
ANION GAP SERPL CALC-SCNC: 16 MMO/L — HIGH (ref 7–14)
APPEARANCE UR: CLEAR — SIGNIFICANT CHANGE UP
AST SERPL-CCNC: 14 U/L — SIGNIFICANT CHANGE UP (ref 4–32)
BASOPHILS # BLD AUTO: 0.05 K/UL — SIGNIFICANT CHANGE UP (ref 0–0.2)
BASOPHILS NFR BLD AUTO: 0.6 % — SIGNIFICANT CHANGE UP (ref 0–2)
BILIRUB SERPL-MCNC: 0.2 MG/DL — SIGNIFICANT CHANGE UP (ref 0.2–1.2)
BILIRUB UR-MCNC: NEGATIVE — SIGNIFICANT CHANGE UP
BLD GP AB SCN SERPL QL: NEGATIVE — SIGNIFICANT CHANGE UP
BLOOD UR QL VISUAL: SIGNIFICANT CHANGE UP
BUN SERPL-MCNC: 13 MG/DL — SIGNIFICANT CHANGE UP (ref 7–23)
CALCIUM SERPL-MCNC: 9.6 MG/DL — SIGNIFICANT CHANGE UP (ref 8.4–10.5)
CHLORIDE SERPL-SCNC: 102 MMOL/L — SIGNIFICANT CHANGE UP (ref 98–107)
CO2 SERPL-SCNC: 22 MMOL/L — SIGNIFICANT CHANGE UP (ref 22–31)
COLOR SPEC: SIGNIFICANT CHANGE UP
CREAT SERPL-MCNC: 0.6 MG/DL — SIGNIFICANT CHANGE UP (ref 0.5–1.3)
EOSINOPHIL # BLD AUTO: 0.46 K/UL — SIGNIFICANT CHANGE UP (ref 0–0.5)
EOSINOPHIL NFR BLD AUTO: 5.5 % — SIGNIFICANT CHANGE UP (ref 0–6)
GLUCOSE SERPL-MCNC: 92 MG/DL — SIGNIFICANT CHANGE UP (ref 70–99)
GLUCOSE UR-MCNC: NEGATIVE — SIGNIFICANT CHANGE UP
HCT VFR BLD CALC: 37.6 % — SIGNIFICANT CHANGE UP (ref 34.5–45)
HGB BLD-MCNC: 12.5 G/DL — SIGNIFICANT CHANGE UP (ref 11.5–15.5)
IMM GRANULOCYTES NFR BLD AUTO: 0.5 % — SIGNIFICANT CHANGE UP (ref 0–1.5)
KETONES UR-MCNC: NEGATIVE — SIGNIFICANT CHANGE UP
LEUKOCYTE ESTERASE UR-ACNC: NEGATIVE — SIGNIFICANT CHANGE UP
LYMPHOCYTES # BLD AUTO: 2.97 K/UL — SIGNIFICANT CHANGE UP (ref 1–3.3)
LYMPHOCYTES # BLD AUTO: 35.7 % — SIGNIFICANT CHANGE UP (ref 13–44)
MCHC RBC-ENTMCNC: 31.6 PG — SIGNIFICANT CHANGE UP (ref 27–34)
MCHC RBC-ENTMCNC: 33.2 % — SIGNIFICANT CHANGE UP (ref 32–36)
MCV RBC AUTO: 95.2 FL — SIGNIFICANT CHANGE UP (ref 80–100)
MONOCYTES # BLD AUTO: 0.78 K/UL — SIGNIFICANT CHANGE UP (ref 0–0.9)
MONOCYTES NFR BLD AUTO: 9.4 % — SIGNIFICANT CHANGE UP (ref 2–14)
NEUTROPHILS # BLD AUTO: 4.01 K/UL — SIGNIFICANT CHANGE UP (ref 1.8–7.4)
NEUTROPHILS NFR BLD AUTO: 48.3 % — SIGNIFICANT CHANGE UP (ref 43–77)
NITRITE UR-MCNC: NEGATIVE — SIGNIFICANT CHANGE UP
NRBC # FLD: 0 K/UL — SIGNIFICANT CHANGE UP (ref 0–0)
PH UR: 6.5 — SIGNIFICANT CHANGE UP (ref 5–8)
PLATELET # BLD AUTO: 264 K/UL — SIGNIFICANT CHANGE UP (ref 150–400)
PMV BLD: 10.6 FL — SIGNIFICANT CHANGE UP (ref 7–13)
POTASSIUM SERPL-MCNC: 3.9 MMOL/L — SIGNIFICANT CHANGE UP (ref 3.5–5.3)
POTASSIUM SERPL-SCNC: 3.9 MMOL/L — SIGNIFICANT CHANGE UP (ref 3.5–5.3)
PROT SERPL-MCNC: 7.4 G/DL — SIGNIFICANT CHANGE UP (ref 6–8.3)
PROT UR-MCNC: 10 — SIGNIFICANT CHANGE UP
RBC # BLD: 3.95 M/UL — SIGNIFICANT CHANGE UP (ref 3.8–5.2)
RBC # FLD: 11.6 % — SIGNIFICANT CHANGE UP (ref 10.3–14.5)
RBC CASTS # UR COMP ASSIST: SIGNIFICANT CHANGE UP (ref 0–?)
RH IG SCN BLD-IMP: POSITIVE — SIGNIFICANT CHANGE UP
SODIUM SERPL-SCNC: 140 MMOL/L — SIGNIFICANT CHANGE UP (ref 135–145)
SP GR SPEC: 1.01 — SIGNIFICANT CHANGE UP (ref 1–1.04)
UROBILINOGEN FLD QL: NORMAL — SIGNIFICANT CHANGE UP
WBC # BLD: 8.31 K/UL — SIGNIFICANT CHANGE UP (ref 3.8–10.5)
WBC # FLD AUTO: 8.31 K/UL — SIGNIFICANT CHANGE UP (ref 3.8–10.5)
WBC UR QL: SIGNIFICANT CHANGE UP (ref 0–?)

## 2020-08-18 PROCEDURE — 76830 TRANSVAGINAL US NON-OB: CPT | Mod: 26

## 2020-08-18 RX ORDER — SODIUM CHLORIDE 9 MG/ML
1000 INJECTION INTRAMUSCULAR; INTRAVENOUS; SUBCUTANEOUS ONCE
Refills: 0 | Status: COMPLETED | OUTPATIENT
Start: 2020-08-18 | End: 2020-08-18

## 2020-08-18 RX ORDER — ONDANSETRON 8 MG/1
4 TABLET, FILM COATED ORAL ONCE
Refills: 0 | Status: COMPLETED | OUTPATIENT
Start: 2020-08-18 | End: 2020-08-18

## 2020-08-18 RX ORDER — KETOROLAC TROMETHAMINE 30 MG/ML
30 SYRINGE (ML) INJECTION ONCE
Refills: 0 | Status: DISCONTINUED | OUTPATIENT
Start: 2020-08-18 | End: 2020-08-18

## 2020-08-18 RX ORDER — ACETAMINOPHEN 500 MG
650 TABLET ORAL ONCE
Refills: 0 | Status: COMPLETED | OUTPATIENT
Start: 2020-08-18 | End: 2020-08-18

## 2020-08-18 RX ADMIN — Medication 650 MILLIGRAM(S): at 05:58

## 2020-08-18 RX ADMIN — SODIUM CHLORIDE 1000 MILLILITER(S): 9 INJECTION INTRAMUSCULAR; INTRAVENOUS; SUBCUTANEOUS at 03:15

## 2020-08-18 RX ADMIN — ONDANSETRON 4 MILLIGRAM(S): 8 TABLET, FILM COATED ORAL at 00:55

## 2020-08-18 RX ADMIN — Medication 30 MILLIGRAM(S): at 00:55

## 2020-08-18 RX ADMIN — SODIUM CHLORIDE 1000 MILLILITER(S): 9 INJECTION INTRAMUSCULAR; INTRAVENOUS; SUBCUTANEOUS at 00:55

## 2020-08-18 NOTE — ED PROVIDER NOTE - CLINICAL SUMMARY MEDICAL DECISION MAKING FREE TEXT BOX
28 y/o F w/ recent hemorrhagic cyst w/ sudden worsening of pain. Concern for rupture. Vitals stable. Heartrate borderline. Will check labs, ultrasounds, POCUS fast exam shows trace fluid in Snads's pouch. Case discussed w/ GYN. Will check transvaginal ultrasound, give fluids, pain medications, and antiemetics. Will reassess pain and keep close eye on vitals. Will transfuse if needed. 28 y/o F w/ recent hemorrhagic cyst w/ sudden worsening of pain. Concern for rupture. Vitals stable. Heart rate borderline. Will check labs, ultrasound. Case discussed w/ GYN. Will check transvaginal ultrasound, give fluids, pain medications, and antiemetics. Will reassess pain and keep close eye on vitals. Will transfuse if needed.

## 2020-08-18 NOTE — ED PROVIDER NOTE - CHPI ED SYMPTOMS NEG
no lightheadedness, no weakness, no chest pain, no shoulder pain, no vaginal pain, no difficulty breathing/no chills/no dysuria/no hematuria/no fever/no vomiting

## 2020-08-18 NOTE — CONSULT NOTE ADULT - SUBJECTIVE AND OBJECTIVE BOX
R4 GYN Consult Note    Patient is a 29y  female LMP 20 presents with sudden onset of abdominal pain this evening. She was recently seen in the ED for abdominal pain 3 days ago for abdominal pain and was found to have a 5cm simple cyst on the right ovary. She has experienced right sided abdominal pain for the past 8 days, with her baseline level of pain of 6/10. She reports this evening while she was sitting on the couch with her child, she had sudden onset of pain increased from 6 to 9/10 out of pain. She additionally experienced nausea during this time. Upon arrival to the ED, she received Zofran and Toradol and her pain has decreased to 6/10 now. She denies lightheadedness, dizziness, CP, SOB, palpitations, dysuria, urinary frequency/urgency, changes in BM, vaginal discharge/bleeding.     OB/GYN HISTORY:   [place]: 2  Parity: 1  Term: 1  VAVD 7#8oz  : 0  MAB/TAB/Ectopic: 1  Livin    Last Menstrual Period 20    Name of GYN Physician: Dr. Nick  Date of Last Pap:  normal per patient   Hx PCOS  Hx ruptured Hemorrhagic cyst s/p  Dx-LSC, removal of hemoperitoneum   Current OCP use: denies     PAST MEDICAL & SURGICAL HISTORY:  Peritoneal adhesions  Umbilical hernia  Ovarian cyst  Asthma in childhood with multiple ER visits: No recent hospitalizarion. No meds. Last asthma attack 13 years ago  History of ovarian cyst: rupture- . pt states she had internal bleeding and needed a transfusion.  H/O hernia repair: umbilical hernia repair x3    S/P Appendectomy age 14      REVIEW OF SYSTEMS  General: denies fever, chills, sweats, lightheadedness, dizziness	  Respiratory and Thorax: denies dyspnea  Cardiovascular:	denies CP, palpitations  Gastrointestinal:	+abd pain, nausea  Genitourinary: denies dysuria, urinary frequency/urgency, vaginal bleeding      MEDICATIONS  (STANDING):    MEDICATIONS  (PRN):      Allergies  fruits (apples, pineapples, pears, cherries,  kiwi)---mouth edema and &quot;itchy throat&quot; (Other)  Nuts (Anaphylaxis)  nuts--mouth edema and &quot;itchy throat&quot; (Other)  Reglan (Hives)  Reglan (Rash)  reglan--pruritic rash (Other)    SOCIAL HISTORY: denies x3     FAMILY HISTORY:  Family history of essential hypertension (Sibling)  Diabetes mellitus, type 2 (Mother)  Diabetes mellitus, type 2 (Father)      Vital Signs Last 24 Hrs  T(C): 36.6 (18 Aug 2020 00:05), Max: 36.6 (18 Aug 2020 00:05)  T(F): 97.8 (18 Aug 2020 00:05), Max: 97.8 (18 Aug 2020 00:05)  HR: 102 (18 Aug 2020 00:05) (102 - 102)  BP: 117/77 (18 Aug 2020 00:05) (117/77 - 117/77)  RR: 18 (18 Aug 2020 00:05) (18 - 18)  SpO2: 100% (18 Aug 2020 00:05) (100% - 100%)    PHYSICAL EXAM:  Constitutional: alert and oriented x 3  Respiratory: CTA-B, symmetrical expansion  Cardiovascular: RRR, S1, S2  Gastrointestinal: soft, RLQ tenderness, no guarding/rebound, + bowel sounds.   Genitourinary: NEFG  Cervix: closed/ long, no CMT  Uterus: normal size, non tender  Adnexa: non tender, no palpable masses          LABS:                        12.5   8.31  )-----------( 264      ( 18 Aug 2020 00:50 )             37.6                 RADIOLOGY & ADDITIONAL STUDIES: R4 GYN Consult Note    Patient is a 29y  female LMP 20 presents with sudden onset of abdominal pain this evening. She was recently seen in the ED for abdominal pain 3 days ago for abdominal pain and was found to have a 5cm simple cyst on the right ovary. She has experienced right sided abdominal pain for the past 8 days, with her baseline level of pain of 6/10. She reports this evening while she was sitting on the couch with her child, she had sudden onset of pain increased from 6 to 9/10 out of pain. She additionally experienced nausea during this time. Upon arrival to the ED, she received Zofran and Toradol and her pain has decreased to 6/10 now. She denies lightheadedness, dizziness, CP, SOB, palpitations, dysuria, urinary frequency/urgency, changes in BM, vaginal discharge/bleeding.     OB/GYN HISTORY:   [place]: 2  Parity: 1  Term: 1  VAVD 7#8oz  : 0  MAB/TAB/Ectopic: 1  Livin    Last Menstrual Period 20    Name of GYN Physician: Dr. Nick  Date of Last Pap:  normal per patient   Hx PCOS  Hx ruptured Hemorrhagic cyst s/p  Dx-LSC, removal of hemoperitoneum   Current OCP use: denies     PAST MEDICAL & SURGICAL HISTORY:  Peritoneal adhesions  Umbilical hernia  Ovarian cyst  Asthma in childhood with multiple ER visits: No recent hospitalizarion. No meds. Last asthma attack 13 years ago  History of ovarian cyst: rupture- . pt states she had internal bleeding and needed a transfusion.  H/O hernia repair: umbilical hernia repair x3    S/P Appendectomy age 14      REVIEW OF SYSTEMS  General: denies fever, chills, sweats, lightheadedness, dizziness	  Respiratory and Thorax: denies dyspnea  Cardiovascular:	denies CP, palpitations  Gastrointestinal:	+abd pain, nausea  Genitourinary: denies dysuria, urinary frequency/urgency, vaginal bleeding      MEDICATIONS  (STANDING):    MEDICATIONS  (PRN):      Allergies  fruits (apples, pineapples, pears, cherries,  kiwi)---mouth edema and &quot;itchy throat&quot; (Other)  Nuts (Anaphylaxis)  nuts--mouth edema and &quot;itchy throat&quot; (Other)  Reglan (Hives)  Reglan (Rash)  reglan--pruritic rash (Other)    SOCIAL HISTORY: denies x3     FAMILY HISTORY:  Family history of essential hypertension (Sibling)  Diabetes mellitus, type 2 (Mother)  Diabetes mellitus, type 2 (Father)      Vital Signs Last 24 Hrs  T(C): 36.6 (18 Aug 2020 00:05), Max: 36.6 (18 Aug 2020 00:05)  T(F): 97.8 (18 Aug 2020 00:05), Max: 97.8 (18 Aug 2020 00:05)  HR: 102 (18 Aug 2020 00:05) (102 - 102)  BP: 117/77 (18 Aug 2020 00:05) (117/77 - 117/77)  RR: 18 (18 Aug 2020 00:05) (18 - 18)  SpO2: 100% (18 Aug 2020 00:05) (100% - 100%)    PHYSICAL EXAM:  Constitutional: alert and oriented x 3  Respiratory: CTA-B, symmetrical expansion  Cardiovascular: RRR, S1, S2  Gastrointestinal: soft, RLQ tenderness, no guarding/rebound, + bowel sounds.   Genitourinary: NEFG  Cervix: closed/ long, no CMT  Uterus: normal size, non tender  Adnexa: non tender, no palpable masses          LABS:                        12.5   8.31  )-----------( 264      ( 18 Aug 2020 00:50 )             37.6                 RADIOLOGY & ADDITIONAL STUDIES:  EXAM:  US TRANSVAGINAL        PROCEDURE DATE:  Aug 18 2020         INTERPRETATION:  CLINICAL INFORMATION: Right pelvic pain. Ovarian cyst seen on prior ultrasound    LMP: 2020    COMPARISON: Pelvic ultrasound 8/15/2020    TECHNIQUE:  Endovaginal pelvic sonogram only. Color and Spectral Doppler was performed.    FINDINGS:    Uterus: 7.0 x 3.0 x 4.8 cm. Within normal limits. Cervical nabothian cyst.  Endometrium: 5 mm. Within normal limits.    Right ovary: 5.6 x 3.0 x 4.0 cm. Complex cyst with lacelike internal echoes measuring 4.9 x 2.1 x 3.8 cm, grossly unchanged from prior. Normal arterial and venous waveforms.  Left ovary: 2.8 x 1.6 x 4 cm. Within normal limits. Normal arterial and venous waveforms.    Fluid: None.    IMPRESSION:  Unchanged complex right ovarian cyst measuring up to 4.9 cm likely a hemorrhagic follicular cyst. Vascular flow documented to the right ovary. No pelvic free fluid.

## 2020-08-18 NOTE — ED PROVIDER NOTE - PROGRESS NOTE DETAILS
pain down to 6/10, seen by gyn, pelvic exam performed with them. awaiting US. Vitals stable: pt feels well. pain well controlled, will give Tylenol as well. US shows no significant change, gyn consult- pt can dc home with precautions and outpt gyn followup.

## 2020-08-18 NOTE — ED ADULT NURSE NOTE - OBJECTIVE STATEMENT
Receive pt in room12, Pt A*Ox3, Pt  complain of worsening abdominal pain , pt was here on Saturday and told to come back if pain worsen. Pt denies fevers chills, n. v ,dizziness ,sob,  chest  pt denies vaginal discharge or bleed. LMP 7/23/20. Abdomen tender to palpation, 20g placed in right a/c labs sent. pt awaiting transvaginal US, fluid and pain meds given will monitor pt. Receive pt in room12, Pt A*Ox3, Pt  with hx of right ovarian cyst complain of worsening abdominal pain , pt was here on Saturday and told to come back if pain worsen. Pt denies fevers chills, v ,dizziness ,sob,  chest  pt denies vaginal discharge or bleed. Pt endorses nausea,  LMP 7/23/20. Abdomen tender to palpation, 20g placed in right a/c labs sent. pt awaiting transvaginal US, fluid and pain meds given will monitor pt.

## 2020-08-18 NOTE — ED ADULT TRIAGE NOTE - CHIEF COMPLAINT QUOTE
pt c/o worsening abdominal pain. pt here on Saturday and diagnosed with bleeding ovarian cyst advised to come back if pain returned. states pain is lower abdomen radiating to back, also c/o nausea. denies dizziness, chest pain, SOB. LMP 7/23/20

## 2020-08-18 NOTE — CONSULT NOTE ADULT - PROBLEM SELECTOR RECOMMENDATION 9
- Pain likely 2/2 cyst, low suspicion for ovarian torsion at this time.   - recommend Tylenol 975mg q6 hr and Ibuprofen 600mg q6 hr PRN for pain control   - discussed activity precautions  - patient to follow up with Private GYN in 2 weeks for follow up visit  - Torsion precautions given    seen with Dr. Susan Zamora [email or web address]

## 2020-08-18 NOTE — CONSULT NOTE ADULT - ASSESSMENT
30 yo  female presenting with worsening RLQ in the setting of known 5cm cyst. Physical exam significant for RLQ without guarding/rebound/peritoneal signs. TVUS showing unchanged cyst, likely hemorrhagic cyst given patient's LMP 20. Pain improved with Toradol from 9->6/10.

## 2020-08-18 NOTE — ED PROVIDER NOTE - NSFOLLOWUPINSTRUCTIONS_ED_ALL_ED_FT
U/S demonstrated hemorrhagic cyst. Please follow up with your GYN for further management return to ER for worsening pain, new onset of fever, nausea/vomiting, vaginal bleed, lightheadedness, dizziness or weakness    1) Follow up with your provider or contact [phone] to assist with scheduling appointment with PCP/specialist for further care  2) Please continue to take any home medications as prescribed. Take Tylenol 650 mg every 4 hours for pain relief/fever control or ibuprofen 600 mg every 6 hours with food as needed for pain.  3) Your results were discussed with you.  4) A copy of your results were given to you prior to discharge.

## 2020-08-18 NOTE — ED PROVIDER NOTE - PATIENT PORTAL LINK FT
You can access the FollowMyHealth Patient Portal offered by [facility] by registering at the following website: [email or web address]. By joining [employer]’s FollowMyHealth portal, you will also be able to view your health information using other applications (apps) compatible with our system.

## 2020-08-18 NOTE — ED PROVIDER NOTE - OBJECTIVE STATEMENT
30 y/o F w/ PMH ovarian cyst, diagnosed 4.9CM R ovarian cyst 3 days ago. Pt has been managing pain w/ ibuprofen 600mg. Tonight after 7-8PM, pt felt sudden worsening of pain up to 9/10, radiating all over her abdomen and into her back w/ nausea, no vomiting. Denies any fever, chills, dysuria, hematuria, lightheadedness, weakness, or vaginal pain. Pt states pain is sharp, worse w/ movement and feels better when she is hunched over. Denies any chest pain, shoulder pain, or difficulty breathing. Pain is currently still 9/10. Nonsmoker. Nondrinker. Not sexually active >1year. 30 y/o F w/ PMH ovarian cyst, diagnosed 4.9CM R ovarian cyst 3 days ago. Pt has been managing pain w/ ibuprofen 600mg. Tonight after 7-8PM, pt felt sudden worsening of pain up to 9/10, radiating all over her abdomen and into her back w/ nausea, no vomiting. Denies any fever, chills, dysuria, hematuria, lightheadedness, weakness, or vaginal pain. Pt states pain is pressure like, worse w/ movement and feels better when she is hunched over. Denies any chest pain, shoulder pain, or difficulty breathing. Pain is currently still 9/10. Nonsmoker. Nondrinker. Not sexually active >1year.

## 2020-08-18 NOTE — CONSULT NOTE ADULT - ATTENDING COMMENTS
28 y/o  c/o RLQ in the setting of known 5cm cyst. Physical exam significant for RLQ tenderness without rebound or guarding. TVUS showing unchanged cyst, likely hemorrhagic cyst given patient's LMP 20. Pain improved with Toradol from 9->/10. right ovarian complex cyst with lacelike internal echoes measuring 4.9 x 2.1 x 3.8 cm, grossly unchanged from prior with normal arterial and venous waveforms. Low suspicion for ovarian torsion. Pt. to f/u as outpatient and to return if pain worsens.

## 2020-08-18 NOTE — ED ADULT NURSE NOTE - NS PRO PASSIVE SMOKE EXP
BREASTFEEDING SUPPORT SERVICES NOTE    Time spent with mother/baby: 7 minutes    Breastfeeding Support Staff have met with the patient/family and the following services have been provided:  Introduced to breastfeeding services    Proper positioning at the breast/latch on  How to know breast feeding is going well at home by keeping a feeding diary, provided  Importance of Skin to Skin contact  24 hour rooming-in encouraged  Discussed cluster feedings  Delay of bottles and pacifiers while infant learning at breast.  Encouraged to look at videos/online breast feeding information  Evaluated medications: None  Maternal history of breastfeeding 1 other babies for 4 weeks. Now 15 months old.      Observed baby at breast as I entered room, latched well with swallows noted. Demonstrated swallows to parents. Assisted with more pillow support, feels more comfortable now. Has pump for home use, lanolin for nipple care. Output adequate, weight loss 1.5%. Plans discharge today. No other concerns.     Lactation to follow if here tomorrow.    No

## 2020-09-20 ENCOUNTER — TRANSCRIPTION ENCOUNTER (OUTPATIENT)
Age: 30
End: 2020-09-20

## 2020-09-29 NOTE — ED ADULT TRIAGE NOTE - TEMPERATURE IN FAHRENHEIT (DEGREES F)
97.8 100 feet/Patient also observed to be ambulating on unit with PCA, without assistive device 20'x 2

## 2020-11-09 ENCOUNTER — EMERGENCY (EMERGENCY)
Facility: HOSPITAL | Age: 30
LOS: 1 days | Discharge: HOME | End: 2020-11-09
Attending: EMERGENCY MEDICINE
Payer: MEDICAID

## 2020-11-09 VITALS
HEIGHT: 58 IN | OXYGEN SATURATION: 98 % | HEART RATE: 71 BPM | RESPIRATION RATE: 16 BRPM | TEMPERATURE: 98 F | SYSTOLIC BLOOD PRESSURE: 100 MMHG | WEIGHT: 147.05 LBS | DIASTOLIC BLOOD PRESSURE: 67 MMHG

## 2020-11-09 DIAGNOSIS — Z98.89 OTHER SPECIFIED POSTPROCEDURAL STATES: Chronic | ICD-10-CM

## 2020-11-09 DIAGNOSIS — Z87.42 PERSONAL HISTORY OF OTHER DISEASES OF THE FEMALE GENITAL TRACT: Chronic | ICD-10-CM

## 2020-11-09 LAB
ANION GAP SERPL CALC-SCNC: 5 MMOL/L — SIGNIFICANT CHANGE UP (ref 5–17)
APPEARANCE UR: CLEAR — SIGNIFICANT CHANGE UP
BASOPHILS # BLD AUTO: 0.05 K/UL — SIGNIFICANT CHANGE UP (ref 0–0.2)
BASOPHILS NFR BLD AUTO: 0.6 % — SIGNIFICANT CHANGE UP (ref 0–2)
BILIRUB UR-MCNC: NEGATIVE — SIGNIFICANT CHANGE UP
BUN SERPL-MCNC: 13 MG/DL — SIGNIFICANT CHANGE UP (ref 7–18)
CALCIUM SERPL-MCNC: 9.2 MG/DL — SIGNIFICANT CHANGE UP (ref 8.4–10.5)
CHLORIDE SERPL-SCNC: 106 MMOL/L — SIGNIFICANT CHANGE UP (ref 96–108)
CO2 SERPL-SCNC: 27 MMOL/L — SIGNIFICANT CHANGE UP (ref 22–31)
COLOR SPEC: YELLOW — SIGNIFICANT CHANGE UP
CREAT SERPL-MCNC: 0.74 MG/DL — SIGNIFICANT CHANGE UP (ref 0.5–1.3)
DIFF PNL FLD: ABNORMAL
EOSINOPHIL # BLD AUTO: 0.42 K/UL — SIGNIFICANT CHANGE UP (ref 0–0.5)
EOSINOPHIL NFR BLD AUTO: 5 % — SIGNIFICANT CHANGE UP (ref 0–6)
GLUCOSE SERPL-MCNC: 96 MG/DL — SIGNIFICANT CHANGE UP (ref 70–99)
GLUCOSE UR QL: NEGATIVE — SIGNIFICANT CHANGE UP
HCT VFR BLD CALC: 38.1 % — SIGNIFICANT CHANGE UP (ref 34.5–45)
HGB BLD-MCNC: 12.8 G/DL — SIGNIFICANT CHANGE UP (ref 11.5–15.5)
IMM GRANULOCYTES NFR BLD AUTO: 0.4 % — SIGNIFICANT CHANGE UP (ref 0–1.5)
KETONES UR-MCNC: NEGATIVE — SIGNIFICANT CHANGE UP
LEUKOCYTE ESTERASE UR-ACNC: NEGATIVE — SIGNIFICANT CHANGE UP
LYMPHOCYTES # BLD AUTO: 2.89 K/UL — SIGNIFICANT CHANGE UP (ref 1–3.3)
LYMPHOCYTES # BLD AUTO: 34.1 % — SIGNIFICANT CHANGE UP (ref 13–44)
MCHC RBC-ENTMCNC: 32.6 PG — SIGNIFICANT CHANGE UP (ref 27–34)
MCHC RBC-ENTMCNC: 33.6 GM/DL — SIGNIFICANT CHANGE UP (ref 32–36)
MCV RBC AUTO: 96.9 FL — SIGNIFICANT CHANGE UP (ref 80–100)
MONOCYTES # BLD AUTO: 0.64 K/UL — SIGNIFICANT CHANGE UP (ref 0–0.9)
MONOCYTES NFR BLD AUTO: 7.5 % — SIGNIFICANT CHANGE UP (ref 2–14)
NEUTROPHILS # BLD AUTO: 4.45 K/UL — SIGNIFICANT CHANGE UP (ref 1.8–7.4)
NEUTROPHILS NFR BLD AUTO: 52.4 % — SIGNIFICANT CHANGE UP (ref 43–77)
NITRITE UR-MCNC: NEGATIVE — SIGNIFICANT CHANGE UP
NRBC # BLD: 0 /100 WBCS — SIGNIFICANT CHANGE UP (ref 0–0)
PH UR: 6 — SIGNIFICANT CHANGE UP (ref 5–8)
PLATELET # BLD AUTO: 270 K/UL — SIGNIFICANT CHANGE UP (ref 150–400)
POTASSIUM SERPL-MCNC: 3.5 MMOL/L — SIGNIFICANT CHANGE UP (ref 3.5–5.3)
POTASSIUM SERPL-SCNC: 3.5 MMOL/L — SIGNIFICANT CHANGE UP (ref 3.5–5.3)
PROT UR-MCNC: NEGATIVE — SIGNIFICANT CHANGE UP
RBC # BLD: 3.93 M/UL — SIGNIFICANT CHANGE UP (ref 3.8–5.2)
RBC # FLD: 11.7 % — SIGNIFICANT CHANGE UP (ref 10.3–14.5)
SODIUM SERPL-SCNC: 138 MMOL/L — SIGNIFICANT CHANGE UP (ref 135–145)
SP GR SPEC: 1.01 — SIGNIFICANT CHANGE UP (ref 1.01–1.02)
UROBILINOGEN FLD QL: NEGATIVE — SIGNIFICANT CHANGE UP
WBC # BLD: 8.48 K/UL — SIGNIFICANT CHANGE UP (ref 3.8–10.5)
WBC # FLD AUTO: 8.48 K/UL — SIGNIFICANT CHANGE UP (ref 3.8–10.5)

## 2020-11-09 PROCEDURE — 99285 EMERGENCY DEPT VISIT HI MDM: CPT

## 2020-11-09 RX ORDER — SODIUM CHLORIDE 9 MG/ML
3 INJECTION INTRAMUSCULAR; INTRAVENOUS; SUBCUTANEOUS ONCE
Refills: 0 | Status: COMPLETED | OUTPATIENT
Start: 2020-11-09 | End: 2020-11-09

## 2020-11-09 RX ORDER — SODIUM CHLORIDE 9 MG/ML
1000 INJECTION INTRAMUSCULAR; INTRAVENOUS; SUBCUTANEOUS ONCE
Refills: 0 | Status: COMPLETED | OUTPATIENT
Start: 2020-11-09 | End: 2020-11-09

## 2020-11-09 RX ORDER — MORPHINE SULFATE 50 MG/1
4 CAPSULE, EXTENDED RELEASE ORAL ONCE
Refills: 0 | Status: DISCONTINUED | OUTPATIENT
Start: 2020-11-09 | End: 2020-11-09

## 2020-11-09 RX ADMIN — SODIUM CHLORIDE 3 MILLILITER(S): 9 INJECTION INTRAMUSCULAR; INTRAVENOUS; SUBCUTANEOUS at 22:33

## 2020-11-09 RX ADMIN — SODIUM CHLORIDE 1000 MILLILITER(S): 9 INJECTION INTRAMUSCULAR; INTRAVENOUS; SUBCUTANEOUS at 22:33

## 2020-11-09 RX ADMIN — MORPHINE SULFATE 4 MILLIGRAM(S): 50 CAPSULE, EXTENDED RELEASE ORAL at 23:32

## 2020-11-09 NOTE — ED PROVIDER NOTE - NSFOLLOWUPCLINICS_GEN_ALL_ED_FT
[facility]  Gastroenterology  [address] [place] [address]  Phone: [phone]  Fax: [phone]  Follow Up Time:     [facility]  Internal Medicine  [address] [place] [address]  Phone: [phone]  Fax: [phone]  Follow Up Time:

## 2020-11-09 NOTE — ED PROVIDER NOTE - PATIENT PORTAL LINK FT
You can access the FollowMyHealth Patient Portal offered by [facility] by registering at the following website: [email or web address]. By joining [email or web address]’s FollowMyHealth portal, you will also be able to view your health information using other applications (apps) compatible with our system.

## 2020-11-09 NOTE — ED PROVIDER NOTE - OBJECTIVE STATEMENT
Chief complaint of right upper abdominal and flank area tenderness progressively worsening x 1 week . +Nausea, no fever, no vomiting.   No chest pain, no shortness of breath.

## 2020-11-09 NOTE — ED ADULT NURSE NOTE - CHPI ED NUR SYMPTOMS NEG
no hematuria/no nausea/no diarrhea/no dysuria/no vomiting/no abdominal distension/no blood in stool/no burning urination/no chills/no fever

## 2020-11-09 NOTE — ED ADULT NURSE NOTE - OBJECTIVE STATEMENT
As per pt, c/o RUQ pain radiating to R flank x1 week worsening to today. As per pt, the is like a punching pain. LMP 10/15/2020. Pt denies CP, SOB, f/c, n/v/d, cough, or H/A.

## 2020-11-09 NOTE — ED PROVIDER NOTE - CLINICAL SUMMARY MEDICAL DECISION MAKING FREE TEXT BOX
330a- Pt is well appearing, no guarding to repeat abdominal palpation, able to eat and drink without vomiting. Pt will f/u with GI-contact provided.   CT reported No evidence of acute intra-abdominal pathology.   Pt is well appearing, has no new complaints and able to walk with normal gait. Pt is stable for discharge and follow up with medical doctor. Pt educated on care and need for follow up. Discussed anticipatory guidance and return precautions. Questions answered. I had a detailed discussion with the patient and/or guardian regarding the historical points, exam findings, and any diagnostic results supporting the discharge diagnosis.

## 2020-11-09 NOTE — ED PROVIDER NOTE - NSFOLLOWUPINSTRUCTIONS_ED_ALL_ED_FT
Return to ER immediately if your abdominal pain does not improve, worsens, or persists, if you have fever, vomiting,  blood in stools or you have black stools, unable to eat or drink, have worsening/persistent diarrhea or constipation, any concerns. See your doctor as soon as possible (within 1-2 days).   If you need further assistance for appointments you can contact the [facility] Care Coordinator at [phone]. In addition our outpatient [facility] is located at [address] [place] [address], tele: [phone].

## 2020-11-09 NOTE — ED ADULT TRIAGE NOTE - NS ED NURSE BANDS TYPE
Name band; Detail Level: Simple Tazorac Counseling:  Patient advised that medication is irritating and drying. Patient may need to apply sparingly and wash off after an hour before eventually leaving it on overnight. The patient verbalized understanding of the proper use and possible adverse effects of tazorac. All of the patient's questions and concerns were addressed. Use Enhanced Medication Counseling?: No Bactrim Counseling:  I discussed with the patient the risks of sulfa antibiotics including but not limited to GI upset, allergic reaction, drug rash, diarrhea, dizziness, photosensitivity, and yeast infections. Rarely, more serious reactions can occur including but not limited to aplastic anemia, agranulocytosis, methemoglobinemia, blood dyscrasias, liver or kidney failure, lung infiltrates or desquamative/blistering drug rashes. Tetracycline Counseling: Patient counseled regarding possible photosensitivity and increased risk for sunburn. Patient instructed to avoid sunlight, if possible. When exposed to sunlight, patients should wear protective clothing, sunglasses, and sunscreen. The patient was instructed to call the office immediately if the following severe adverse effects occur:  hearing changes, easy bruising/bleeding, severe headache, or vision changes. The patient verbalized understanding of the proper use and possible adverse effects of tetracycline. All of the patient's questions and concerns were addressed. Patient understands to avoid pregnancy while on therapy due to potential birth defects. Dapsone Counseling: I discussed with the patient the risks of dapsone including but not limited to hemolytic anemia, agranulocytosis, rashes, methemoglobinemia, kidney failure, peripheral neuropathy, headaches, GI upset, and liver toxicity. Patients who start dapsone require monitoring including baseline LFTs and weekly CBCs for the first month, then every month thereafter. The patient verbalized understanding of the proper use and possible adverse effects of dapsone. All of the patient's questions and concerns were addressed. Azithromycin Counseling:  I discussed with the patient the risks of azithromycin including but not limited to GI upset, allergic reaction, drug rash, diarrhea, and yeast infections. Benzoyl Peroxide Pregnancy And Lactation Text: This medication is Pregnancy Category C. It is unknown if benzoyl peroxide is excreted in breast milk. Spironolactone Counseling: Patient advised regarding risks of diarrhea, abdominal pain, hyperkalemia, birth defects (for female patients), liver toxicity and renal toxicity. The patient may need blood work to monitor liver and kidney function and potassium levels while on therapy. The patient verbalized understanding of the proper use and possible adverse effects of spironolactone. All of the patient's questions and concerns were addressed. Topical Retinoid counseling:  Patient advised to apply a pea-sized amount only at bedtime and wait 30 minutes after washing their face before applying. If too drying, patient may add a non-comedogenic moisturizer. The patient verbalized understanding of the proper use and possible adverse effects of retinoids. All of the patient's questions and concerns were addressed. Isotretinoin Counseling: Patient should get monthly blood tests, not donate blood, not drive at night if vision affected, not share medication, and not undergo elective surgery for 6 months after tx completed. Side effects reviewed, pt to contact office should one occur. Isotretinoin Pregnancy And Lactation Text: This medication is Pregnancy Category X and is considered extremely dangerous during pregnancy. It is unknown if it is excreted in breast milk. Tetracycline Pregnancy And Lactation Text: This medication is Pregnancy Category D and not consider safe during pregnancy. It is also excreted in breast milk. Erythromycin Pregnancy And Lactation Text: This medication is Pregnancy Category B and is considered safe during pregnancy. It is also excreted in breast milk. High Dose Vitamin A Counseling: Side effects reviewed, pt to contact office should one occur. Topical Sulfur Applications Counseling: Topical Sulfur Counseling: Patient counseled that this medication may cause skin irritation or allergic reactions. In the event of skin irritation, the patient was advised to reduce the amount of the drug applied or use it less frequently. The patient verbalized understanding of the proper use and possible adverse effects of topical sulfur application. All of the patient's questions and concerns were addressed. Birth Control Pills Pregnancy And Lactation Text: This medication should be avoided if pregnant and for the first 30 days post-partum. Doxycycline Counseling:  Patient counseled regarding possible photosensitivity and increased risk for sunburn. Patient instructed to avoid sunlight, if possible. When exposed to sunlight, patients should wear protective clothing, sunglasses, and sunscreen. The patient was instructed to call the office immediately if the following severe adverse effects occur:  hearing changes, easy bruising/bleeding, severe headache, or vision changes. The patient verbalized understanding of the proper use and possible adverse effects of doxycycline. All of the patient's questions and concerns were addressed. Topical Sulfur Applications Pregnancy And Lactation Text: This medication is Pregnancy Category C and has an unknown safety profile during pregnancy. It is unknown if this topical medication is excreted in breast milk. Benzoyl Peroxide Counseling: Patient counseled that medicine may cause skin irritation and bleach clothing. In the event of skin irritation, the patient was advised to reduce the amount of the drug applied or use it less frequently. The patient verbalized understanding of the proper use and possible adverse effects of benzoyl peroxide. All of the patient's questions and concerns were addressed. Bactrim Pregnancy And Lactation Text: This medication is Pregnancy Category D and is known to cause fetal risk. It is also excreted in breast milk. Spironolactone Pregnancy And Lactation Text: This medication can cause feminization of the male fetus and should be avoided during pregnancy. The active metabolite is also found in breast milk. Topical Retinoid Pregnancy And Lactation Text: This medication is Pregnancy Category C. It is unknown if this medication is excreted in breast milk. Doxycycline Pregnancy And Lactation Text: This medication is Pregnancy Category D and not consider safe during pregnancy. It is also excreted in breast milk but is considered safe for shorter treatment courses. Dapsone Pregnancy And Lactation Text: This medication is Pregnancy Category C and is not considered safe during pregnancy or breast feeding. Topical Clindamycin Pregnancy And Lactation Text: This medication is Pregnancy Category B and is considered safe during pregnancy. It is unknown if it is excreted in breast milk. Minocycline Counseling: Patient advised regarding possible photosensitivity and discoloration of the teeth, skin, lips, tongue and gums. Patient instructed to avoid sunlight, if possible. When exposed to sunlight, patients should wear protective clothing, sunglasses, and sunscreen. The patient was instructed to call the office immediately if the following severe adverse effects occur:  hearing changes, easy bruising/bleeding, severe headache, or vision changes. The patient verbalized understanding of the proper use and possible adverse effects of minocycline. All of the patient's questions and concerns were addressed. Erythromycin Counseling:  I discussed with the patient the risks of erythromycin including but not limited to GI upset, allergic reaction, drug rash, diarrhea, increase in liver enzymes, and yeast infections. Sarecycline Counseling: Patient advised regarding possible photosensitivity and discoloration of the teeth, skin, lips, tongue and gums. Patient instructed to avoid sunlight, if possible. When exposed to sunlight, patients should wear protective clothing, sunglasses, and sunscreen. The patient was instructed to call the office immediately if the following severe adverse effects occur:  hearing changes, easy bruising/bleeding, severe headache, or vision changes. The patient verbalized understanding of the proper use and possible adverse effects of sarecycline. All of the patient's questions and concerns were addressed. Azithromycin Pregnancy And Lactation Text: This medication is considered safe during pregnancy and is also secreted in breast milk. Birth Control Pills Counseling: Birth Control Pill Counseling: I discussed with the patient the potential side effects of OCPs including but not limited to increased risk of stroke, heart attack, thrombophlebitis, deep venous thrombosis, hepatic adenomas, breast changes, GI upset, headaches, and depression. The patient verbalized understanding of the proper use and possible adverse effects of OCPs. All of the patient's questions and concerns were addressed. High Dose Vitamin A Pregnancy And Lactation Text: High dose vitamin A therapy is contraindicated during pregnancy and breast feeding. Topical Clindamycin Counseling: Patient counseled that this medication may cause skin irritation or allergic reactions. In the event of skin irritation, the patient was advised to reduce the amount of the drug applied or use it less frequently. The patient verbalized understanding of the proper use and possible adverse effects of clindamycin. All of the patient's questions and concerns were addressed. Tazorac Pregnancy And Lactation Text: This medication is not safe during pregnancy. It is unknown if this medication is excreted in breast milk.

## 2020-11-10 LAB — HCG UR QL: NEGATIVE — SIGNIFICANT CHANGE UP

## 2020-11-10 PROCEDURE — 83690 ASSAY OF LIPASE: CPT

## 2020-11-10 PROCEDURE — 80048 BASIC METABOLIC PNL TOTAL CA: CPT

## 2020-11-10 PROCEDURE — 81001 URINALYSIS AUTO W/SCOPE: CPT

## 2020-11-10 PROCEDURE — 36415 COLL VENOUS BLD VENIPUNCTURE: CPT

## 2020-11-10 PROCEDURE — 80076 HEPATIC FUNCTION PANEL: CPT

## 2020-11-10 PROCEDURE — 74176 CT ABD & PELVIS W/O CONTRAST: CPT

## 2020-11-10 PROCEDURE — 84702 CHORIONIC GONADOTROPIN TEST: CPT

## 2020-11-10 PROCEDURE — 96374 THER/PROPH/DIAG INJ IV PUSH: CPT

## 2020-11-10 PROCEDURE — 85025 COMPLETE CBC W/AUTO DIFF WBC: CPT

## 2020-11-10 PROCEDURE — 74176 CT ABD & PELVIS W/O CONTRAST: CPT | Mod: 26

## 2020-11-10 PROCEDURE — 81025 URINE PREGNANCY TEST: CPT

## 2020-11-10 PROCEDURE — 99284 EMERGENCY DEPT VISIT MOD MDM: CPT | Mod: 25

## 2020-11-10 RX ORDER — ACETAMINOPHEN 500 MG
2 TABLET ORAL
Qty: 20 | Refills: 0
Start: 2020-11-10

## 2020-11-22 ENCOUNTER — TRANSCRIPTION ENCOUNTER (OUTPATIENT)
Age: 30
End: 2020-11-22

## 2020-12-03 ENCOUNTER — APPOINTMENT (OUTPATIENT)
Age: 30
End: 2020-12-03
Payer: MEDICAID

## 2020-12-03 VITALS
WEIGHT: 146 LBS | OXYGEN SATURATION: 98 % | HEIGHT: 59 IN | HEART RATE: 85 BPM | TEMPERATURE: 96.8 F | DIASTOLIC BLOOD PRESSURE: 67 MMHG | BODY MASS INDEX: 29.43 KG/M2 | SYSTOLIC BLOOD PRESSURE: 102 MMHG

## 2020-12-03 DIAGNOSIS — R10.9 UNSPECIFIED ABDOMINAL PAIN: ICD-10-CM

## 2020-12-03 PROCEDURE — 99203 OFFICE O/P NEW LOW 30 MIN: CPT

## 2020-12-03 PROCEDURE — 99072 ADDL SUPL MATRL&STAF TM PHE: CPT

## 2020-12-06 NOTE — ASSESSMENT
[FreeTextEntry1] : This patient has right flank pain.\par \par Plan\par HIDA scan and sono and if negative will her father for urology workup to rule out kidney stones. I have taken the liberty to refer her to Dr. Escalante as she has had recurrent UTIs as well

## 2020-12-06 NOTE — HISTORY OF PRESENT ILLNESS
[de-identified] : This patient is 30 years old with right flank pain 2 weeks ago and also to the right side. Mom and grandmother had gallbladder issues. CT scan was negative. Labs normal. LFTs and lipase normal. The pain wakes her up at night at times. Comes on after meals. She never had a sono a HIDA scan. She has no nausea vomiting. She has had no groin pain. She works for [employer] company.

## 2020-12-07 ENCOUNTER — APPOINTMENT (OUTPATIENT)
Age: 30
End: 2020-12-07
Payer: MEDICAID

## 2020-12-07 VITALS
BODY MASS INDEX: 29.43 KG/M2 | HEART RATE: 78 BPM | HEIGHT: 59 IN | SYSTOLIC BLOOD PRESSURE: 116 MMHG | DIASTOLIC BLOOD PRESSURE: 74 MMHG | WEIGHT: 146 LBS | TEMPERATURE: 98.4 F

## 2020-12-07 PROCEDURE — 99072 ADDL SUPL MATRL&STAF TM PHE: CPT

## 2020-12-07 PROCEDURE — 99204 OFFICE O/P NEW MOD 45 MIN: CPT

## 2020-12-13 NOTE — HISTORY OF PRESENT ILLNESS
[FreeTextEntry1] : 29 yo F with history of recurrent UTI for the lat 2 yrs\par Intermittent dysuria, sometimes straining\par Usually voids only about 3 times per day, nocturia 1/night\par 7 courses of abx this year, most recently two weeks ago, feels better after abx\par Went to gyn this past summer - told to go to urologist for possible urethral diverticulum\par not tender, no bleeding\par no gross hematuria\par Mild stress incontinence - no pads or diapers\par Drinks 5 bottles of water daily\par 1 child, \par normal bowel movements\par Of note, presented to ER about 4 weeks ago with right upper quadrant pain, usually after a meal\par CT scan showed normal kidneys
Euthymic

## 2020-12-13 NOTE — PHYSICAL EXAM
[General Appearance - Well Developed] : well developed [General Appearance - Well Nourished] : well nourished [Normal Appearance] : normal appearance [Well Groomed] : well groomed [General Appearance - In No Acute Distress] : no acute distress [Edema] : no peripheral edema [Respiration, Rhythm And Depth] : normal respiratory rhythm and effort [Exaggerated Use Of Accessory Muscles For Inspiration] : no accessory muscle use [Abdomen Soft] : soft [Abdomen Tenderness] : non-tender [Costovertebral Angle Tenderness] : no ~M costovertebral angle tenderness [Urinary Bladder Findings] : the bladder was normal on palpation [Normal Station and Gait] : the gait and station were normal for the patient's age [] : no rash [No Focal Deficits] : no focal deficits [Oriented To Time, Place, And Person] : oriented to person, place, and time [Affect] : the affect was normal [Mood] : the mood was normal [Not Anxious] : not anxious [No Palpable Adenopathy] : no palpable adenopathy [Urethral Meatus] : the meatus of the urethra showed no abnormalities [External Female Genitalia] : normal external genitalia [Vagina] : normal vaginal exam [FreeTextEntry1] : small anterior vaginal cyst vs urethral cyst measuring about 1-2 cm

## 2020-12-13 NOTE — ASSESSMENT
[FreeTextEntry1] : 31 yo F with recurrent UTI, urethral tic vs cyst\par \par - PVR = 0ml\par - discussed possible etiologies for UTI. Spent extensive period of time discussed behavioral modification including adequate hydration, cutting back on caffeine intake, timed voiding during the day, importance of controlling any diabetes and chronic constipation and how all of these things could potentially increase risk for persistent or recurrent UTI.\par - Reviewed options for urethral cyst vs tic including observation vs surgical excision and the pros and cons of each option. Also discussed how this may be contributing to her recurrent UTI\par - UA, culture

## 2020-12-23 ENCOUNTER — APPOINTMENT (OUTPATIENT)
Dept: ULTRASOUND IMAGING | Facility: HOSPITAL | Age: 30
End: 2020-12-23
Payer: MEDICAID

## 2020-12-23 ENCOUNTER — OUTPATIENT (OUTPATIENT)
Dept: OUTPATIENT SERVICES | Facility: HOSPITAL | Age: 30
LOS: 1 days | End: 2020-12-23
Payer: MEDICAID

## 2020-12-23 DIAGNOSIS — Z98.89 OTHER SPECIFIED POSTPROCEDURAL STATES: Chronic | ICD-10-CM

## 2020-12-23 DIAGNOSIS — K62.89 OTHER SPECIFIED DISEASES OF ANUS AND RECTUM: ICD-10-CM

## 2020-12-23 DIAGNOSIS — Z87.42 PERSONAL HISTORY OF OTHER DISEASES OF THE FEMALE GENITAL TRACT: Chronic | ICD-10-CM

## 2020-12-23 PROCEDURE — 76700 US EXAM ABDOM COMPLETE: CPT

## 2020-12-23 PROCEDURE — 76700 US EXAM ABDOM COMPLETE: CPT | Mod: 26

## 2020-12-30 LAB
APPEARANCE: CLEAR
BACTERIA UR CULT: ABNORMAL
BACTERIA: NEGATIVE
BILIRUBIN URINE: NEGATIVE
BLOOD URINE: ABNORMAL
COLOR: NORMAL
GLUCOSE QUALITATIVE U: NEGATIVE
HYALINE CASTS: 0 /LPF
KETONES URINE: NEGATIVE
LEUKOCYTE ESTERASE URINE: ABNORMAL
MICROSCOPIC-UA: NORMAL
NITRITE URINE: NEGATIVE
PH URINE: 6
PROTEIN URINE: NORMAL
RED BLOOD CELLS URINE: 6 /HPF
SPECIFIC GRAVITY URINE: 1.03
SQUAMOUS EPITHELIAL CELLS: 4 /HPF
UROBILINOGEN URINE: NORMAL
WHITE BLOOD CELLS URINE: 7 /HPF

## 2021-02-02 ENCOUNTER — APPOINTMENT (OUTPATIENT)
Dept: SURGERY | Facility: CLINIC | Age: 31
End: 2021-02-02
Payer: MEDICAID

## 2021-02-02 VITALS
WEIGHT: 146 LBS | SYSTOLIC BLOOD PRESSURE: 108 MMHG | BODY MASS INDEX: 29.43 KG/M2 | HEART RATE: 81 BPM | DIASTOLIC BLOOD PRESSURE: 70 MMHG | OXYGEN SATURATION: 99 % | HEIGHT: 59 IN | TEMPERATURE: 98.2 F

## 2021-02-02 VITALS — TEMPERATURE: 97.2 F

## 2021-02-02 DIAGNOSIS — R10.9 UNSPECIFIED ABDOMINAL PAIN: ICD-10-CM

## 2021-02-02 PROCEDURE — 99203 OFFICE O/P NEW LOW 30 MIN: CPT

## 2021-02-02 PROCEDURE — 99072 ADDL SUPL MATRL&STAF TM PHE: CPT

## 2021-02-02 RX ORDER — DICLOFENAC SODIUM 50 MG/1
50 TABLET, DELAYED RELEASE ORAL
Qty: 60 | Refills: 1 | Status: DISCONTINUED | COMMUNITY
Start: 2018-11-16 | End: 2021-02-02

## 2021-02-03 LAB — SARS-COV-2 N GENE NPH QL NAA+PROBE: NOT DETECTED

## 2021-02-03 NOTE — DATA REVIEWED
[FreeTextEntry1] : EXAM:  US ABDOMEN COMPLETE\par \par PROCEDURE DATE:  12/23/2020\par \par INTERPRETATION:  CLINICAL INFORMATION: Right flank pain\par \par COMPARISON: Abdominal CT dated 11/10/2020.\par \par TECHNIQUE: Sonography of the abdomen.\par \par FINDINGS:\par \par Liver: Within normal limits.\par Bile ducts: Normal caliber. Common bile duct measures 3.4 mm.\par Gallbladder: 1.7 cm stone in the gallbladder neck. No evidence for thickened gallbladder wall, pericholecystic fluid or ultrasonic Wong's sign.\par Pancreas: Visualized portions are within normal limits.\par Spleen: 9.7 cm. Within normal limits.\par Right kidney: 9.7 cm. No hydronephrosis. Within normal limits.\par Left kidney: 11.5 cm.  No hydronephrosis. Within normal limits.\par Ascites: None.\par Aorta and IVC: Visualized portions are within normal limits.\par \par IMPRESSION:\par 1.7 cm stone in the gallbladder neck. No evidence for thickened gallbladder wall, pericholecystic fluid or ultrasonic Wong's sign.\par \par

## 2021-02-03 NOTE — PLAN
[FreeTextEntry1] : Patient was told significance of findings, options, risks and benefits were explained. We will arrange for pre-surgical testing and schedule the patient for laparoscopic cholecystectomy possible open at [facility]. \par \par PST\par Labs\par COVID

## 2021-02-03 NOTE — PHYSICAL EXAM
[Normal Breath Sounds] : Normal breath sounds [Normal Heart Sounds] : normal heart sounds [No Rash or Lesion] : No rash or lesion [Alert] : alert [Oriented to Person] : oriented to person [Oriented to Place] : oriented to place [Oriented to Time] : oriented to time [Calm] : calm [de-identified] : The patient is alert, well-groomed, well developed and cheerful.  [de-identified] : Head is normocephalic. Conjunctiva pink, anicteric. Nasal mucosa pink, septum midline. Oral mucosa pink. Tongue midline, pharynx without exudates. \par \par   [de-identified] : Neck supple. Trachea midline. Thyroid isthmus barely palpable, lobes not felt.\par   [de-identified] : Breath sounds equal and bilateral, no wheezing no rales or rhonchi  [de-identified] :  good S1, S2, no m/r/g bilateral  [de-identified] : Normoactive bowel sounds, soft and nontender, no hepatosplenomegaly or masses noted, Patient well healed scars, no hernias\par   [de-identified] : WNL

## 2021-02-03 NOTE — ASSESSMENT
[FreeTextEntry1] : 30 year F with gallstones. Recommend laparoscopic cholecystectomy possible open  to avoid complications of gallstones. Risks, benefits, alternatives discussed. Risks discussed include: bleeding, bile duct injury, accidental injury to adjacent structures, cystic duct leak, need to do open procedure, need to do subtotal cholecystectomy, infection. All questions answered. She agrees to proceed. \par \par PST\par Labs\par COVID

## 2021-02-03 NOTE — HISTORY OF PRESENT ILLNESS
[de-identified] : JESU OHARA is a 30 year old female who present in the office for initial consultation who was referred by Dr. Crum with the chief complaint of having right flank pain. Patient stated that pain is not radiating. She reports that she has that pain all the time and it gets worse postprandial. Patient taking Motrin for pain in the morning and night.  She has abdominal ultrasound on 12/23/2020 that showed 1.7 cm stone in the gallbladder neck. No evidence for thickened gallbladder wall, pericholecystic fluid or ultrasonic Wong's sign. Patient reports no nausea or vomiting and no history of jaundice, acholia or choluria. Appetite is good ( mostly takes shakes)  and weight is stable. \par \par Patient has PShx of  raptured appendix, s/p appendectomy, s/p 3 umbilical hernia repair without a mesh, and raptured ovarian cyst

## 2021-02-04 ENCOUNTER — APPOINTMENT (OUTPATIENT)
Dept: ULTRASOUND IMAGING | Facility: HOSPITAL | Age: 31
End: 2021-02-04

## 2021-02-04 ENCOUNTER — APPOINTMENT (OUTPATIENT)
Dept: NUCLEAR MEDICINE | Facility: HOSPITAL | Age: 31
End: 2021-02-04

## 2021-02-04 ENCOUNTER — TRANSCRIPTION ENCOUNTER (OUTPATIENT)
Age: 31
End: 2021-02-04

## 2021-02-04 ENCOUNTER — OUTPATIENT (OUTPATIENT)
Dept: OUTPATIENT SERVICES | Facility: HOSPITAL | Age: 31
LOS: 1 days | End: 2021-02-04
Payer: MEDICAID

## 2021-02-04 VITALS
WEIGHT: 147.05 LBS | RESPIRATION RATE: 18 BRPM | DIASTOLIC BLOOD PRESSURE: 72 MMHG | HEIGHT: 59 IN | SYSTOLIC BLOOD PRESSURE: 104 MMHG | OXYGEN SATURATION: 100 % | TEMPERATURE: 98 F | HEART RATE: 72 BPM

## 2021-02-04 DIAGNOSIS — K81.9 CHOLECYSTITIS, UNSPECIFIED: ICD-10-CM

## 2021-02-04 DIAGNOSIS — Z98.89 OTHER SPECIFIED POSTPROCEDURAL STATES: Chronic | ICD-10-CM

## 2021-02-04 DIAGNOSIS — Z01.818 ENCOUNTER FOR OTHER PREPROCEDURAL EXAMINATION: ICD-10-CM

## 2021-02-04 DIAGNOSIS — Z87.42 PERSONAL HISTORY OF OTHER DISEASES OF THE FEMALE GENITAL TRACT: Chronic | ICD-10-CM

## 2021-02-04 DIAGNOSIS — S62.309A UNSPECIFIED FRACTURE OF UNSPECIFIED METACARPAL BONE, INITIAL ENCOUNTER FOR CLOSED FRACTURE: Chronic | ICD-10-CM

## 2021-02-04 LAB — BLD GP AB SCN SERPL QL: SIGNIFICANT CHANGE UP

## 2021-02-04 PROCEDURE — G0463: CPT

## 2021-02-04 RX ORDER — SODIUM CHLORIDE 9 MG/ML
3 INJECTION INTRAMUSCULAR; INTRAVENOUS; SUBCUTANEOUS EVERY 8 HOURS
Refills: 0 | Status: DISCONTINUED | OUTPATIENT
Start: 2021-02-05 | End: 2021-02-05

## 2021-02-04 RX ORDER — DOCUSATE SODIUM 100 MG
1 CAPSULE ORAL
Qty: 0 | Refills: 0 | DISCHARGE

## 2021-02-04 NOTE — H&P PST ADULT - NSICDXFAMILYHX_GEN_ALL_CORE_FT
FAMILY HISTORY:  FH: HTN (hypertension), mother    Father  Still living? No  Diabetes mellitus, type 2, Age at diagnosis: Age Unknown    Mother  Still living? Yes, Estimated age: Age Unknown  Diabetes mellitus, type 2, Age at diagnosis: Age Unknown    Sibling  Still living? Yes, Estimated age: Age Unknown  Family history of essential hypertension, Age at diagnosis: Age Unknown

## 2021-02-04 NOTE — H&P PST ADULT - NSICDXPROBLEM_GEN_ALL_CORE_FT
PROBLEM DIAGNOSES  Problem: Cholecystitis  Assessment and Plan: patient is scheduled for laparoscopic cholecystectomy possible open on 2/5/2021

## 2021-02-04 NOTE — H&P PST ADULT - NSICDXPASTSURGICALHX_GEN_ALL_CORE_FT
PAST SURGICAL HISTORY:  Fracture, metacarpal s/p repair with metal plate    H/O hernia repair umbilical hernia repair 2011 X 3    History of ovarian cyst rupture- 2011. pt states she had internal bleeding and needed a transfusion.    S/P Appendectomy

## 2021-02-04 NOTE — H&P PST ADULT - ASSESSMENT
30 years old female with PMHX asthma, PCOS and PSHX appendectomy umbilical hernia repair x3, ovarian cyst rupture, metacarpal bone fracture with repair presented for presurgical evaluation for laparoscopic cholecystectomy possible open on 2/4/2021

## 2021-02-04 NOTE — H&P PST ADULT - NEUROLOGICAL DETAILS
alert and oriented x 3/responds to pain/responds to verbal commands/sensation intact/deep reflexes intact/cranial nerves intact/no spontaneous movement

## 2021-02-04 NOTE — H&P PST ADULT - REASON FOR ADMISSION
"I have been having spasms in my right upper abdomen. I was told I have stones and my gallbladder needs to be removed" "I have been having spasms in my right upper abdomen. I was told I have stones and my gallbladder needs to be removed."

## 2021-02-04 NOTE — H&P PST ADULT - MUSCULOSKELETAL
details… normal/ROM intact/no joint swelling/no joint erythema/no joint warmth/no calf tenderness/normal strength detailed exam

## 2021-02-04 NOTE — H&P PST ADULT - NEGATIVE MUSCULOSKELETAL SYMPTOMS
no arthralgia/no arthritis/no joint swelling/no myalgia/no muscle cramps/no muscle weakness/no stiffness

## 2021-02-04 NOTE — H&P PST ADULT - NEGATIVE GENERAL GENITOURINARY SYMPTOMS
no hematuria/no renal colic/no flank pain L/no flank pain R/no urine discoloration/no urinary hesitancy/normal urinary frequency/no nocturia

## 2021-02-04 NOTE — H&P PST ADULT - HISTORY OF PRESENT ILLNESS
30 years old female with PMHX asthma, PCOS and PSHX appendectony, umbilical hernia repair x3, ovarian cyst ruprure, metacarpal bone fracture with repair 30 years old female with PMHX asthma, PCOS and PSHX appendectomy umbilical hernia repair x3, ovarian cyst rupture, metacarpal bone fracture with repair presented for presurgical evaluation for laparoscopic cholecystectomy possible open on 2/5/2021

## 2021-02-04 NOTE — H&P PST ADULT - NEGATIVE NEUROLOGICAL SYMPTOMS
no transient paralysis/no weakness/no paresthesias/no generalized seizures/no focal seizures/no syncope/no tremors/no difficulty walking

## 2021-02-05 ENCOUNTER — RESULT REVIEW (OUTPATIENT)
Age: 31
End: 2021-02-05

## 2021-02-05 ENCOUNTER — OUTPATIENT (OUTPATIENT)
Dept: OUTPATIENT SERVICES | Facility: HOSPITAL | Age: 31
LOS: 1 days | End: 2021-02-05
Payer: MEDICAID

## 2021-02-05 VITALS
DIASTOLIC BLOOD PRESSURE: 56 MMHG | SYSTOLIC BLOOD PRESSURE: 105 MMHG | RESPIRATION RATE: 20 BRPM | OXYGEN SATURATION: 98 % | HEART RATE: 83 BPM | TEMPERATURE: 98 F

## 2021-02-05 VITALS
HEIGHT: 59 IN | DIASTOLIC BLOOD PRESSURE: 60 MMHG | OXYGEN SATURATION: 100 % | RESPIRATION RATE: 16 BRPM | WEIGHT: 147.05 LBS | HEART RATE: 75 BPM | SYSTOLIC BLOOD PRESSURE: 111 MMHG | TEMPERATURE: 98 F

## 2021-02-05 DIAGNOSIS — K81.9 CHOLECYSTITIS, UNSPECIFIED: ICD-10-CM

## 2021-02-05 DIAGNOSIS — Z01.818 ENCOUNTER FOR OTHER PREPROCEDURAL EXAMINATION: ICD-10-CM

## 2021-02-05 DIAGNOSIS — S62.309A UNSPECIFIED FRACTURE OF UNSPECIFIED METACARPAL BONE, INITIAL ENCOUNTER FOR CLOSED FRACTURE: Chronic | ICD-10-CM

## 2021-02-05 DIAGNOSIS — Z87.42 PERSONAL HISTORY OF OTHER DISEASES OF THE FEMALE GENITAL TRACT: Chronic | ICD-10-CM

## 2021-02-05 DIAGNOSIS — Z98.89 OTHER SPECIFIED POSTPROCEDURAL STATES: Chronic | ICD-10-CM

## 2021-02-05 LAB — BLD GP AB SCN SERPL QL: SIGNIFICANT CHANGE UP

## 2021-02-05 PROCEDURE — C1889: CPT

## 2021-02-05 PROCEDURE — 47563 LAPARO CHOLECYSTECTOMY/GRAPH: CPT

## 2021-02-05 PROCEDURE — 86850 RBC ANTIBODY SCREEN: CPT

## 2021-02-05 PROCEDURE — 86901 BLOOD TYPING SEROLOGIC RH(D): CPT

## 2021-02-05 PROCEDURE — 88304 TISSUE EXAM BY PATHOLOGIST: CPT | Mod: 26

## 2021-02-05 PROCEDURE — 86900 BLOOD TYPING SEROLOGIC ABO: CPT

## 2021-02-05 PROCEDURE — 47562 LAPAROSCOPIC CHOLECYSTECTOMY: CPT

## 2021-02-05 PROCEDURE — 76000 FLUOROSCOPY <1 HR PHYS/QHP: CPT

## 2021-02-05 PROCEDURE — 36415 COLL VENOUS BLD VENIPUNCTURE: CPT

## 2021-02-05 PROCEDURE — 88304 TISSUE EXAM BY PATHOLOGIST: CPT

## 2021-02-05 RX ORDER — ACETAMINOPHEN 500 MG
2 TABLET ORAL
Qty: 32 | Refills: 0
Start: 2021-02-05 | End: 2021-02-08

## 2021-02-05 RX ORDER — OXYCODONE AND ACETAMINOPHEN 5; 325 MG/1; MG/1
1 TABLET ORAL ONCE
Refills: 0 | Status: DISCONTINUED | OUTPATIENT
Start: 2021-02-05 | End: 2021-02-05

## 2021-02-05 RX ORDER — IBUPROFEN 200 MG
1 TABLET ORAL
Qty: 16 | Refills: 0
Start: 2021-02-05 | End: 2021-02-08

## 2021-02-05 RX ORDER — SODIUM CHLORIDE 9 MG/ML
1000 INJECTION, SOLUTION INTRAVENOUS
Refills: 0 | Status: DISCONTINUED | OUTPATIENT
Start: 2021-02-05 | End: 2021-02-06

## 2021-02-05 RX ORDER — HYDROMORPHONE HYDROCHLORIDE 2 MG/ML
1 INJECTION INTRAMUSCULAR; INTRAVENOUS; SUBCUTANEOUS
Refills: 0 | Status: DISCONTINUED | OUTPATIENT
Start: 2021-02-05 | End: 2021-02-06

## 2021-02-05 RX ORDER — HYDROMORPHONE HYDROCHLORIDE 2 MG/ML
0.5 INJECTION INTRAMUSCULAR; INTRAVENOUS; SUBCUTANEOUS
Refills: 0 | Status: DISCONTINUED | OUTPATIENT
Start: 2021-02-05 | End: 2021-02-06

## 2021-02-05 RX ADMIN — SODIUM CHLORIDE 3 MILLILITER(S): 9 INJECTION INTRAMUSCULAR; INTRAVENOUS; SUBCUTANEOUS at 10:53

## 2021-02-05 RX ADMIN — OXYCODONE AND ACETAMINOPHEN 1 TABLET(S): 5; 325 TABLET ORAL at 18:39

## 2021-02-05 NOTE — ASU DISCHARGE PLAN (ADULT/PEDIATRIC) - CALL YOUR DOCTOR IF YOU HAVE ANY OF THE FOLLOWING:
Wound/Surgical Site with redness, or foul smelling discharge or pus Bleeding that does not stop/Wound/Surgical Site with redness, or foul smelling discharge or pus/Nausea and vomiting that does not stop/Unable to urinate

## 2021-02-05 NOTE — ASU PATIENT PROFILE, ADULT - NSSUBSTANCEUSE_GEN_ALL_CORE_SD
40 and continuing as l
stephen as a woman is in good health.  Breast MRI is recommended for women with an approximate 20-25% or 
greater lifetime risk of breast cancer, including women with a strong family history of breast or ova
kirstin cancer or who have been treated for Hodgkin's disease.



Signer Name: Stephanie Santillan MD 

Signed: 12/16/2020 8:58 AM

Workstation Name: [employer]

## 2021-02-05 NOTE — ASU PATIENT PROFILE, ADULT - NS PRO AD INFO GIVEN Y
Location: [address]    HEARING AID ORIENTATION/FITTING    Patient arrived for hearing aid fitting:  Hearing evaluation performed within past 6 months, purchase agreement signed and reviewed warranty/hearing aid life expectancy, payment collected and  receipt completed, insurance authorization received, if applicable, reviewed departmental policies.    Medical clearance: signed waiver (patient has high frequency asymmetry, discussed with patient that he might consider seeing ENT due to asymmetry)    Accompanied by: none    HEARING AIDS:  Fitting Date: 9/17/19  Payor/Insurance: [facility]  Make/Model: Richard Akers [id number] AIs     Left: [id number]      Right: [id number]  Service Plan: 1 year service plan with [employer] (11/13/2020);  warranty 11/13/22  Color//Dome: steel grey / gain 50 snap receivers (length 3) / 7 mm open dome  Battery Size: none  /Warranty:11/13/22  Loss/Damage:11/13/22  Accessories:  [id number]    Initial Programming: Patient stated speech seemed \"echo-y\" and his own voice sounded different. Decreased overall gain, and low frequency gain 2 clicks in software. Patient stated that it sounded better. Sounded clear and comfortable    PATIENT REVIEW:  yes:  Patient Reviewed: VC (Volume Control), Program Button,  Battery, manual, tools, troubleshooting  yes:  Review of battery changing and safety. Patient can perform: yes  yes:  Insertion/removal of hearing aid, earmold,  dome. Patient can perform: yes  yes:  Cleaning of hearing aid, earmold, dome. Patient can perform: yes     yes:  Reviewed telephone use.      Accessories provided: manuals, , domes, wax guards, desicant, brush    Patient Impressions: After adjustments, patient stated hearing aids sounded clear and comfortable    Real ear performed: no - will perform at follow up    Other: Paired to iphone but not able to get service in the building to demonstrate use. Patient stated he would try it  out on his own. Also not able to download kaden in office (still loading). Patient stated that he would like to play with it on his own. Showed him manual and instructions on how to change routing of signal, as well as descriptions of different options within the kaden       Recommendations: Daily use, remove at night and when showering or swimming, use brush/pick to clean daily, place in  when not in use.     Follow-up: Patient will call to schedule follow up appointment      yes

## 2021-02-05 NOTE — ASU DISCHARGE PLAN (ADULT/PEDIATRIC) - CARE PROVIDER_API CALL
Kaz Yang (MD)  Surgery  [address] [place] [address]  Phone: [phone]  Fax: [phone]  Follow Up Time:

## 2021-02-05 NOTE — BRIEF OPERATIVE NOTE - OPERATION/FINDINGS
preop diagnosis:   postop diagnosis:   procedure: laparoscopic cholecystectomy with intraoperative cholangiogram

## 2021-02-05 NOTE — ASU DISCHARGE PLAN (ADULT/PEDIATRIC) - ASU DC SPECIAL INSTRUCTIONSFT
you may shower normally tomorrow but no bathing or soaking for at least 2 weeks. leave the skin tape in place; they will come off on their own with showering. no heavy lifting for 4 weeks; your incisions will take time to heal and strengthen. you may have some diarrhea at first since your gallbladder has been removed, but this should resolve after some time. take tylenol and ibuprofen around the clock for the first few days to help control pain.

## 2021-02-05 NOTE — ASU PATIENT PROFILE, ADULT - PSH
Fracture, metacarpal  s/p repair with metal plate  H/O hernia repair  umbilical hernia repair 2011 X 3  History of ovarian cyst  rupture- 2011. pt states she had internal bleeding and needed a transfusion.  S/P Appendectomy

## 2021-02-08 ENCOUNTER — RESULT REVIEW (OUTPATIENT)
Age: 31
End: 2021-02-08

## 2021-02-08 PROBLEM — J45.909 UNSPECIFIED ASTHMA, UNCOMPLICATED: Chronic | Status: ACTIVE | Noted: 2021-02-04

## 2021-02-08 PROBLEM — Z87.42 PERSONAL HISTORY OF OTHER DISEASES OF THE FEMALE GENITAL TRACT: Chronic | Status: ACTIVE | Noted: 2021-02-04

## 2021-02-09 LAB — SURGICAL PATHOLOGY STUDY: SIGNIFICANT CHANGE UP

## 2021-02-16 ENCOUNTER — APPOINTMENT (OUTPATIENT)
Dept: SURGERY | Facility: CLINIC | Age: 31
End: 2021-02-16
Payer: MEDICAID

## 2021-02-16 VITALS
HEIGHT: 59 IN | TEMPERATURE: 97.7 F | BODY MASS INDEX: 29.43 KG/M2 | WEIGHT: 146 LBS | HEART RATE: 90 BPM | SYSTOLIC BLOOD PRESSURE: 90 MMHG | DIASTOLIC BLOOD PRESSURE: 62 MMHG

## 2021-02-16 DIAGNOSIS — Z87.42 PERSONAL HISTORY OF OTHER DISEASES OF THE FEMALE GENITAL TRACT: ICD-10-CM

## 2021-02-16 DIAGNOSIS — K42.9 UMBILICAL HERNIA W/OUT OBSTRUCTION OR GANGRENE: ICD-10-CM

## 2021-02-16 DIAGNOSIS — Z30.09 ENCOUNTER FOR OTHER GENERAL COUNSELING AND ADVICE ON CONTRACEPTION: ICD-10-CM

## 2021-02-16 PROCEDURE — 99024 POSTOP FOLLOW-UP VISIT: CPT

## 2021-02-23 LAB
ALBUMIN SERPL ELPH-MCNC: 4.8 G/DL
ALP BLD-CCNC: 69 U/L
ALT SERPL-CCNC: 17 U/L
ANION GAP SERPL CALC-SCNC: 12 MMOL/L
APTT BLD: 31.5 SEC
AST SERPL-CCNC: 14 U/L
BASOPHILS # BLD AUTO: 0.05 K/UL
BASOPHILS NFR BLD AUTO: 0.7 %
BILIRUB SERPL-MCNC: 0.5 MG/DL
BUN SERPL-MCNC: 14 MG/DL
CALCIUM SERPL-MCNC: 9.7 MG/DL
CHLORIDE SERPL-SCNC: 102 MMOL/L
CO2 SERPL-SCNC: 23 MMOL/L
CREAT SERPL-MCNC: 0.75 MG/DL
EOSINOPHIL # BLD AUTO: 0.19 K/UL
EOSINOPHIL NFR BLD AUTO: 2.6 %
GLUCOSE SERPL-MCNC: 87 MG/DL
HCG SERPL QL: NEGATIVE
HCT VFR BLD CALC: 42.6 %
HGB BLD-MCNC: 13.6 G/DL
IMM GRANULOCYTES NFR BLD AUTO: 0.4 %
INR PPP: 0.98 RATIO
LYMPHOCYTES # BLD AUTO: 1.62 K/UL
LYMPHOCYTES NFR BLD AUTO: 22.4 %
MAN DIFF?: NORMAL
MCHC RBC-ENTMCNC: 31.9 GM/DL
MCHC RBC-ENTMCNC: 32.1 PG
MCV RBC AUTO: 100.5 FL
MONOCYTES # BLD AUTO: 0.7 K/UL
MONOCYTES NFR BLD AUTO: 9.7 %
NEUTROPHILS # BLD AUTO: 4.63 K/UL
NEUTROPHILS NFR BLD AUTO: 64.2 %
PAPP-A SERPL-ACNC: <1 MIU/ML
PLATELET # BLD AUTO: 280 K/UL
POTASSIUM SERPL-SCNC: 4.5 MMOL/L
PROT SERPL-MCNC: 7.9 G/DL
PT BLD: 11.6 SEC
RBC # BLD: 4.24 M/UL
RBC # FLD: 11.9 %
SODIUM SERPL-SCNC: 137 MMOL/L
WBC # FLD AUTO: 7.22 K/UL

## 2021-02-24 NOTE — PHYSICAL EXAM
[Normal Breath Sounds] : Normal breath sounds [Normal Heart Sounds] : normal heart sounds [No Rash or Lesion] : No rash or lesion [Alert] : alert [Oriented to Person] : oriented to person [Oriented to Place] : oriented to place [Oriented to Time] : oriented to time [Calm] : calm [de-identified] : The patient is alert, well-groomed, well developed and cheerful.  [de-identified] : Head is normocephalic. Conjunctiva pink, anicteric. Nasal mucosa pink, septum midline. Oral mucosa pink. Tongue midline, pharynx without exudates. \par \par   [de-identified] : Neck supple. Trachea midline. Thyroid isthmus barely palpable, lobes not felt.\par   [de-identified] : Breath sounds equal and bilateral, no wheezing no rales or rhonchi  [de-identified] :  good S1, S2, no m/r/g bilateral  [de-identified] : Normoactive bowel sounds, soft and nontender, no hepatosplenomegaly or masses noted, incision sites are healing well  [de-identified] : WNL

## 2021-02-24 NOTE — ASSESSMENT
[FreeTextEntry1] : JESU OHARA is a 30 year old female who underwent a laparoscopic cholecystectomy on 02/05/2021 pathology results are consistent with chronic calculous cholecystitis, cholesterolosis.\par \par Patient is doing well, with expected post-operative recovery. All surgical incisions are healing well and as expected. There is no evidence of infection or complication, and patient is progressing as expected. Post-operative wound care, activity, restrictions and precautions reinforced. path discussed. Patient was instructed no heavy lifting  3  weeks. Patient's questions and concerns addressed to patient's satisfaction. \par

## 2021-02-24 NOTE — HISTORY OF PRESENT ILLNESS
[de-identified] : JESU OHARA is a 30 year old female who presents in the office for postop visit. She underwent a laparoscopic cholecystectomy on 02/05/2021 pathology results are consistent with chronic calculous cholecystitis, cholesterolosis. Today patient is doing well, offers no complaints. Denies any fevers, chills, nausea, vomiting, diarrhea or constipation. Patient able to tolerate regular diet with normal bowel movements. Surgical incisions are healing well. No sign of inflammation or exudate. Patient stated that pain is improved. Patient denies any pain at present time \par  \par

## 2021-03-08 ENCOUNTER — APPOINTMENT (OUTPATIENT)
Dept: UROLOGY | Facility: CLINIC | Age: 31
End: 2021-03-08
Payer: MEDICAID

## 2021-03-08 PROCEDURE — 99072 ADDL SUPL MATRL&STAF TM PHE: CPT

## 2021-03-08 PROCEDURE — 99214 OFFICE O/P EST MOD 30 MIN: CPT

## 2021-03-11 NOTE — PHYSICAL EXAM
[General Appearance - Well Developed] : well developed [General Appearance - Well Nourished] : well nourished [Normal Appearance] : normal appearance [Well Groomed] : well groomed [General Appearance - In No Acute Distress] : no acute distress [Abdomen Soft] : soft [Abdomen Tenderness] : non-tender [Costovertebral Angle Tenderness] : no ~M costovertebral angle tenderness [Urethral Meatus] : the meatus of the urethra showed no abnormalities [Urinary Bladder Findings] : the bladder was normal on palpation [External Female Genitalia] : normal external genitalia [Vagina] : normal vaginal exam [FreeTextEntry1] : small anterior vaginal cyst vs urethral cyst measuring about 1-2 cm [Edema] : no peripheral edema [] : no respiratory distress [Respiration, Rhythm And Depth] : normal respiratory rhythm and effort [Exaggerated Use Of Accessory Muscles For Inspiration] : no accessory muscle use [Oriented To Time, Place, And Person] : oriented to person, place, and time [Affect] : the affect was normal [Mood] : the mood was normal [Not Anxious] : not anxious [Normal Station and Gait] : the gait and station were normal for the patient's age [No Focal Deficits] : no focal deficits [No Palpable Adenopathy] : no palpable adenopathy

## 2021-03-11 NOTE — ASSESSMENT
[FreeTextEntry1] : 29 yo F with periurethral cyst, recurrent UTI\par \par - Reviewed the pros and cons of periurethral cyst excision and all risks and benefits. Pt would like to proceed\par - Will obtain pelvic MRI to confirm diagnosis\par - UA and culture\par - Spent extensive period of time discussed behavioral modification including adequate hydration, cutting back on caffeine intake, timed voiding during the day, importance of controlling any diabetes and chronic constipation and how all of these things could potentially increase risk for persistent or recurrent UTI.\par

## 2021-03-11 NOTE — HISTORY OF PRESENT ILLNESS
[FreeTextEntry1] : 31 yo F with history of recurrent UTI for the lat 2 yrs\par Intermittent dysuria, sometimes straining\par Usually voids only about 3 times per day, nocturia 1/night\par 7 courses of abx this year, most recently two weeks ago, feels better after abx\par Went to gyn this past summer - told to go to urologist for possible urethral diverticulum\par not tender, no bleeding\par no gross hematuria\par Mild stress incontinence - no pads or diapers\par Drinks 5 bottles of water daily\par 1 child, \par normal bowel movements\par Of note, presented to ER about 4 weeks ago with right upper quadrant pain, usually after a meal\par CT scan showed normal kidneys\par \par 3/8/21 Interval history: Pt is s/p cholecystectomy \par Since procedure, has been having dysuria and urethral pain\par No gross hematuria\par No fever, chills

## 2021-03-15 LAB
APPEARANCE: CLEAR
BACTERIA UR CULT: ABNORMAL
BACTERIA: NEGATIVE
BILIRUBIN URINE: NEGATIVE
BLOOD URINE: ABNORMAL
BUN SERPL-MCNC: 14 MG/DL
COLOR: NORMAL
CREAT SERPL-MCNC: 0.66 MG/DL
GLUCOSE QUALITATIVE U: NEGATIVE
HYALINE CASTS: 0 /LPF
KETONES URINE: NEGATIVE
LEUKOCYTE ESTERASE URINE: ABNORMAL
MICROSCOPIC-UA: NORMAL
NITRITE URINE: NEGATIVE
PH URINE: 5.5
PROTEIN URINE: NEGATIVE
RED BLOOD CELLS URINE: 2 /HPF
SPECIFIC GRAVITY URINE: 1.02
SQUAMOUS EPITHELIAL CELLS: 2 /HPF
UROBILINOGEN URINE: NORMAL
WHITE BLOOD CELLS URINE: 4 /HPF

## 2021-03-23 ENCOUNTER — APPOINTMENT (OUTPATIENT)
Dept: SURGERY | Facility: CLINIC | Age: 31
End: 2021-03-23
Payer: MEDICAID

## 2021-03-23 VITALS
BODY MASS INDEX: 29.64 KG/M2 | WEIGHT: 147 LBS | HEIGHT: 59 IN | DIASTOLIC BLOOD PRESSURE: 68 MMHG | HEART RATE: 75 BPM | SYSTOLIC BLOOD PRESSURE: 100 MMHG

## 2021-03-23 VITALS — TEMPERATURE: 98.2 F

## 2021-03-23 DIAGNOSIS — K81.9 CHOLECYSTITIS, UNSPECIFIED: ICD-10-CM

## 2021-03-23 DIAGNOSIS — Z78.9 OTHER SPECIFIED HEALTH STATUS: ICD-10-CM

## 2021-03-23 PROCEDURE — 99024 POSTOP FOLLOW-UP VISIT: CPT

## 2021-03-23 RX ORDER — METFORMIN HYDROCHLORIDE 500 MG/1
500 TABLET, COATED ORAL
Refills: 0 | Status: ACTIVE | COMMUNITY

## 2021-03-23 RX ORDER — SPIRONOLACTONE 25 MG/1
25 TABLET ORAL
Refills: 0 | Status: ACTIVE | COMMUNITY

## 2021-03-30 NOTE — HISTORY OF PRESENT ILLNESS
[de-identified] : JESU OHARA is a 30 year old female who presents in the office for postop visit. She underwent a laparoscopic cholecystectomy on 02/05/2021 pathology results are consistent with chronic calculous cholecystitis, cholesterolosis. Patient c/o of bloating, and pain next to epigastric incision site. On physical exam no hernia. Patient denies any other discomfort.

## 2021-03-30 NOTE — ASSESSMENT
[FreeTextEntry1] : JESU OHARA is a 30 year old female who underwent a laparoscopic cholecystectomy on 02/05/2021 pathology results are consistent with chronic calculous cholecystitis, cholesterolosis.\par \par \par Patient is doing well, On physical exam no hernia. Patient will follow up with Urology for periurethral cyst excision.

## 2021-03-30 NOTE — PHYSICAL EXAM
[Normal Breath Sounds] : Normal breath sounds [Normal Heart Sounds] : normal heart sounds [No Rash or Lesion] : No rash or lesion [Alert] : alert [Oriented to Person] : oriented to person [Oriented to Place] : oriented to place [Oriented to Time] : oriented to time [Calm] : calm [de-identified] : The patient is alert, well-groomed, well developed and cheerful.  [de-identified] : Head is normocephalic. Conjunctiva pink, anicteric. Nasal mucosa pink, septum midline. Oral mucosa pink. Tongue midline, pharynx without exudates. \par \par   [de-identified] : Neck supple. Trachea midline. Thyroid isthmus barely palpable, lobes not felt.\par   [de-identified] : Breath sounds equal and bilateral, no wheezing no rales or rhonchi  [de-identified] : Normoactive bowel sounds, soft and nontender, no hepatosplenomegaly or masses noted, incision sites are healed well no hernia [de-identified] :  good S1, S2, no m/r/g bilateral  [de-identified] : WNL

## 2021-03-31 ENCOUNTER — OUTPATIENT (OUTPATIENT)
Dept: OUTPATIENT SERVICES | Facility: HOSPITAL | Age: 31
LOS: 1 days | End: 2021-03-31
Payer: MEDICAID

## 2021-03-31 VITALS
DIASTOLIC BLOOD PRESSURE: 51 MMHG | HEIGHT: 58 IN | HEART RATE: 77 BPM | SYSTOLIC BLOOD PRESSURE: 103 MMHG | WEIGHT: 149.91 LBS | RESPIRATION RATE: 18 BRPM | OXYGEN SATURATION: 98 % | TEMPERATURE: 98 F

## 2021-03-31 DIAGNOSIS — S62.309A UNSPECIFIED FRACTURE OF UNSPECIFIED METACARPAL BONE, INITIAL ENCOUNTER FOR CLOSED FRACTURE: Chronic | ICD-10-CM

## 2021-03-31 DIAGNOSIS — N36.8 OTHER SPECIFIED DISORDERS OF URETHRA: ICD-10-CM

## 2021-03-31 DIAGNOSIS — Z98.89 OTHER SPECIFIED POSTPROCEDURAL STATES: Chronic | ICD-10-CM

## 2021-03-31 DIAGNOSIS — E28.2 POLYCYSTIC OVARIAN SYNDROME: ICD-10-CM

## 2021-03-31 DIAGNOSIS — Z87.42 PERSONAL HISTORY OF OTHER DISEASES OF THE FEMALE GENITAL TRACT: Chronic | ICD-10-CM

## 2021-03-31 DIAGNOSIS — Z01.818 ENCOUNTER FOR OTHER PREPROCEDURAL EXAMINATION: ICD-10-CM

## 2021-03-31 LAB
ANION GAP SERPL CALC-SCNC: 9 MMOL/L — SIGNIFICANT CHANGE UP (ref 5–17)
APPEARANCE UR: CLEAR — SIGNIFICANT CHANGE UP
APTT BLD: 28.6 SEC — SIGNIFICANT CHANGE UP (ref 27.5–35.5)
BACTERIA # UR AUTO: ABNORMAL /HPF
BILIRUB UR-MCNC: NEGATIVE — SIGNIFICANT CHANGE UP
BUN SERPL-MCNC: 15 MG/DL — SIGNIFICANT CHANGE UP (ref 7–18)
CALCIUM SERPL-MCNC: 8.5 MG/DL — SIGNIFICANT CHANGE UP (ref 8.4–10.5)
CHLORIDE SERPL-SCNC: 104 MMOL/L — SIGNIFICANT CHANGE UP (ref 96–108)
CO2 SERPL-SCNC: 24 MMOL/L — SIGNIFICANT CHANGE UP (ref 22–31)
COLOR SPEC: YELLOW — SIGNIFICANT CHANGE UP
CREAT SERPL-MCNC: 0.68 MG/DL — SIGNIFICANT CHANGE UP (ref 0.5–1.3)
DIFF PNL FLD: ABNORMAL
EPI CELLS # UR: ABNORMAL /HPF
GLUCOSE SERPL-MCNC: 87 MG/DL — SIGNIFICANT CHANGE UP (ref 70–99)
GLUCOSE UR QL: NEGATIVE — SIGNIFICANT CHANGE UP
HCT VFR BLD CALC: 38.5 % — SIGNIFICANT CHANGE UP (ref 34.5–45)
HGB BLD-MCNC: 13 G/DL — SIGNIFICANT CHANGE UP (ref 11.5–15.5)
INR BLD: 0.87 RATIO — LOW (ref 0.88–1.16)
KETONES UR-MCNC: NEGATIVE — SIGNIFICANT CHANGE UP
LEUKOCYTE ESTERASE UR-ACNC: ABNORMAL
MCHC RBC-ENTMCNC: 33.1 PG — SIGNIFICANT CHANGE UP (ref 27–34)
MCHC RBC-ENTMCNC: 33.8 GM/DL — SIGNIFICANT CHANGE UP (ref 32–36)
MCV RBC AUTO: 98 FL — SIGNIFICANT CHANGE UP (ref 80–100)
NITRITE UR-MCNC: NEGATIVE — SIGNIFICANT CHANGE UP
NRBC # BLD: 0 /100 WBCS — SIGNIFICANT CHANGE UP (ref 0–0)
PH UR: 5 — SIGNIFICANT CHANGE UP (ref 5–8)
PLATELET # BLD AUTO: 295 K/UL — SIGNIFICANT CHANGE UP (ref 150–400)
POTASSIUM SERPL-MCNC: 3.7 MMOL/L — SIGNIFICANT CHANGE UP (ref 3.5–5.3)
POTASSIUM SERPL-SCNC: 3.7 MMOL/L — SIGNIFICANT CHANGE UP (ref 3.5–5.3)
PROT UR-MCNC: 15
PROTHROM AB SERPL-ACNC: 10.4 SEC — LOW (ref 10.6–13.6)
RBC # BLD: 3.93 M/UL — SIGNIFICANT CHANGE UP (ref 3.8–5.2)
RBC # FLD: 11.3 % — SIGNIFICANT CHANGE UP (ref 10.3–14.5)
RBC CASTS # UR COMP ASSIST: >50 /HPF (ref 0–2)
SODIUM SERPL-SCNC: 137 MMOL/L — SIGNIFICANT CHANGE UP (ref 135–145)
SP GR SPEC: 1.02 — SIGNIFICANT CHANGE UP (ref 1.01–1.02)
UROBILINOGEN FLD QL: NEGATIVE — SIGNIFICANT CHANGE UP
WBC # BLD: 6.78 K/UL — SIGNIFICANT CHANGE UP (ref 3.8–10.5)
WBC # FLD AUTO: 6.78 K/UL — SIGNIFICANT CHANGE UP (ref 3.8–10.5)
WBC UR QL: >50 /HPF (ref 0–5)

## 2021-03-31 PROCEDURE — 80048 BASIC METABOLIC PNL TOTAL CA: CPT

## 2021-03-31 PROCEDURE — 87086 URINE CULTURE/COLONY COUNT: CPT

## 2021-03-31 PROCEDURE — 81001 URINALYSIS AUTO W/SCOPE: CPT

## 2021-03-31 PROCEDURE — 85027 COMPLETE CBC AUTOMATED: CPT

## 2021-03-31 PROCEDURE — 85730 THROMBOPLASTIN TIME PARTIAL: CPT

## 2021-03-31 PROCEDURE — G0463: CPT

## 2021-03-31 PROCEDURE — 36415 COLL VENOUS BLD VENIPUNCTURE: CPT

## 2021-03-31 PROCEDURE — 85610 PROTHROMBIN TIME: CPT

## 2021-03-31 NOTE — H&P PST ADULT - HISTORY OF PRESENT ILLNESS
30year old female with pmhx of asthma, umbilical hernia, PCOS, appendicitis, metacarpal fracture, ovarian cyst rupture and cholecystitis - s/p cholecystectomy 2/8/2021 presents today for presurgical testing for scheduled cystoscopy, excision of perirurethral cyst on 4/13/2021.

## 2021-03-31 NOTE — H&P PST ADULT - NSICDXPROBLEM_GEN_ALL_CORE_FT
PROBLEM DIAGNOSES  Problem: Other specified disorders of urethra  Assessment and Plan: Patient is scheduled for cystoscopy, excision of periurethral cyst on 4/13/2021. Written and oral preoperative instruction given to patient with understanding verbalized. Instructions given include maintaining NPO status post-midnight day before surgery and using 4% chlorhexidine wash as directed the morning of surgery    Problem: PCOS (polycystic ovarian syndrome)  Assessment and Plan: Patient instructed not to take metformin the morning of surgery. Understanding verbalized

## 2021-03-31 NOTE — H&P PST ADULT - NSICDXPASTMEDICALHX_GEN_ALL_CORE_FT
PAST MEDICAL HISTORY:  Appendicitis     Asthma     Endometriosis     History of PCOS     Ovarian cyst     Umbilical hernia

## 2021-03-31 NOTE — H&P PST ADULT - DOES PATIENT HAVE ADVANCE DIRECTIVE
Anemia    Kidney stones In case of an emergency patient designates mother Martina Newberry [phone]

## 2021-04-01 LAB
CULTURE RESULTS: SIGNIFICANT CHANGE UP
SPECIMEN SOURCE: SIGNIFICANT CHANGE UP

## 2021-04-09 NOTE — ED ADULT NURSE NOTE - CARDIO ASSESSMENT
[Alert] : alert [Well Nourished] : well nourished [No Acute Distress] : no acute distress [Well Developed] : well developed [Normal Sclera/Conjunctiva] : normal sclera/conjunctiva [EOMI] : extra ocular movement intact [No Proptosis] : no proptosis WDL [Clear to Auscultation] : lungs were clear to auscultation bilaterally [Normal S1, S2] : normal S1 and S2 [Normal Rate] : heart rate was normal [Regular Rhythm] : with a regular rhythm [No Edema] : no peripheral edema [Normal Bowel Sounds] : normal bowel sounds [Not Tender] : non-tender [Not Distended] : not distended [Soft] : abdomen soft [Normal Anterior Cervical Nodes] : no anterior cervical lymphadenopathy [No Spinal Tenderness] : no spinal tenderness [Spine Straight] : spine straight [No Stigmata of Cushings Syndrome] : no stigmata of Cushings Syndrome [Normal Gait] : normal gait [Normal Reflexes] : deep tendon reflexes were 2+ and symmetric [No Tremors] : no tremors [Oriented x3] : oriented to person, place, and time [de-identified] : midline firm thyroid nodule

## 2021-04-10 ENCOUNTER — APPOINTMENT (OUTPATIENT)
Dept: DISASTER EMERGENCY | Facility: CLINIC | Age: 31
End: 2021-04-10

## 2021-04-11 LAB — SARS-COV-2 N GENE NPH QL NAA+PROBE: NOT DETECTED

## 2021-04-13 ENCOUNTER — APPOINTMENT (OUTPATIENT)
Dept: UROLOGY | Facility: HOSPITAL | Age: 31
End: 2021-04-13
Payer: MEDICAID

## 2021-04-21 PROBLEM — K42.9 UMBILICAL HERNIA WITHOUT OBSTRUCTION OR GANGRENE: Chronic | Status: ACTIVE | Noted: 2021-03-31

## 2021-04-21 PROBLEM — N83.209 UNSPECIFIED OVARIAN CYST, UNSPECIFIED SIDE: Chronic | Status: ACTIVE | Noted: 2021-03-31

## 2021-04-21 PROBLEM — K37 UNSPECIFIED APPENDICITIS: Chronic | Status: ACTIVE | Noted: 2021-03-31

## 2021-04-26 LAB
HCG SERPL QL: NEGATIVE
PAPP-A SERPL-ACNC: <1 MIU/ML

## 2021-04-28 ENCOUNTER — OUTPATIENT (OUTPATIENT)
Dept: OUTPATIENT SERVICES | Facility: HOSPITAL | Age: 31
LOS: 1 days | End: 2021-04-28
Payer: MEDICAID

## 2021-04-28 VITALS
HEIGHT: 58 IN | WEIGHT: 149.91 LBS | RESPIRATION RATE: 16 BRPM | SYSTOLIC BLOOD PRESSURE: 123 MMHG | TEMPERATURE: 98 F | HEART RATE: 75 BPM | OXYGEN SATURATION: 97 % | DIASTOLIC BLOOD PRESSURE: 79 MMHG

## 2021-04-28 DIAGNOSIS — Z29.9 ENCOUNTER FOR PROPHYLACTIC MEASURES, UNSPECIFIED: ICD-10-CM

## 2021-04-28 DIAGNOSIS — N36.8 OTHER SPECIFIED DISORDERS OF URETHRA: ICD-10-CM

## 2021-04-28 DIAGNOSIS — S62.309A UNSPECIFIED FRACTURE OF UNSPECIFIED METACARPAL BONE, INITIAL ENCOUNTER FOR CLOSED FRACTURE: Chronic | ICD-10-CM

## 2021-04-28 DIAGNOSIS — Z01.818 ENCOUNTER FOR OTHER PREPROCEDURAL EXAMINATION: ICD-10-CM

## 2021-04-28 DIAGNOSIS — Z98.89 OTHER SPECIFIED POSTPROCEDURAL STATES: Chronic | ICD-10-CM

## 2021-04-28 DIAGNOSIS — Z87.42 PERSONAL HISTORY OF OTHER DISEASES OF THE FEMALE GENITAL TRACT: Chronic | ICD-10-CM

## 2021-04-28 DIAGNOSIS — E28.2 POLYCYSTIC OVARIAN SYNDROME: ICD-10-CM

## 2021-04-28 PROCEDURE — G0463: CPT

## 2021-04-28 RX ORDER — SODIUM CHLORIDE 9 MG/ML
3 INJECTION INTRAMUSCULAR; INTRAVENOUS; SUBCUTANEOUS EVERY 8 HOURS
Refills: 0 | Status: DISCONTINUED | OUTPATIENT
Start: 2021-05-11 | End: 2021-05-11

## 2021-04-28 NOTE — H&P PST ADULT - NSICDXPASTMEDICALHX_GEN_ALL_CORE_FT
PAST MEDICAL HISTORY:  Appendicitis     Asthma     Endometriosis     History of PCOS     Obesity     Ovarian cyst     Umbilical hernia

## 2021-04-28 NOTE — H&P PST ADULT - HISTORY OF PRESENT ILLNESS
29 yo female with history of POCs, Asthma (last hospitalization was 13 years ago, never been intubated), reports the above.   She is scheduled for : Cystoscopy  Excision of Periurethral Cyst, on 5/11/21    Was to have same surgery on 4/13/21, but was cancelled because the surgeon needed an MRI which was done on 4/27/21

## 2021-04-28 NOTE — H&P PST ADULT - NSICDXPROBLEM_GEN_ALL_CORE_FT
PROBLEM DIAGNOSES  Problem: Other specified disorders of urethra  Assessment and Plan:  Cystoscopy  Excision of Periurethral Cyst    Problem: Need for prophylactic measure  Assessment and Plan: Instruction given regarding chlorhexidine soap to be used at home before coming for the procedure.   Patient verbalized understanding.   Literature also given    Problem: Polycystic ovarian syndrome  Assessment and Plan: No metformin before coming to the hospital

## 2021-04-29 NOTE — H&P PST ADULT - EYES
4/29/2021    No chief complaint on file.      HPI:    Dany is a 7-year-old male who was last seen in pediatric nephrology clinic on 1/31/19 secondary to a solitary kidney and a history of urinary tract infection.    Today he is accompanied by his mother and has been doing well.  No concerns, no urinary infections, and no hematuria.    Review of Systems   Constitutional: Negative.  Negative for activity change, appetite change and fever.   HENT: Negative.  Negative for congestion, nosebleeds and sore throat.    Eyes: Negative.  Negative for visual disturbance.   Respiratory: Negative.    Cardiovascular: Negative.    Gastrointestinal: Positive for blood in stool (scheduled to see gastroenterologist). Negative for abdominal distention, abdominal pain, anal bleeding, constipation, diarrhea, nausea and vomiting.   Endocrine: Negative.  Negative for polydipsia and polyuria.   Genitourinary: Negative.  Negative for decreased urine volume, difficulty urinating, dysuria, enuresis, flank pain, frequency, hematuria and urgency.   Musculoskeletal: Negative.  Negative for back pain and joint swelling.   Skin: Negative.  Negative for rash.   Neurological: Negative.  Negative for seizures and headaches.   Hematological: Negative.  Does not bruise/bleed easily.   Psychiatric/Behavioral: Negative for sleep disturbance.       Current Medications:   No current outpatient medications on file.     No current facility-administered medications for this visit.         ALLERGIES:  No Known Allergies      Relevant Past Medical History:    Past Medical History:   Diagnosis Date   • History of UTI 2/27/2015 January 2015: E. coli   • Solitary kidney    • Twin birth        History reviewed. No pertinent surgical history.     Family History   Problem Relation Age of Onset   • Kidney disease Mother         pyelonephritis   • Urolithiasis Mother    • Patient is unaware of any medical problems Father        Social History     Social History  Narrative    Lives with parents, grandparents, and one brother.      Father:     Mother: Ahmet    [place], [place]    First grade right now: Wants to do something with trains when older-->-->.  Enjoys playing outside on trampoline.  In person school.       Examination:   Blood pressure 109/63, height 4' 0.23\" (1.225 m), weight (!) 39.4 kg (86 lb 15.5 oz).  Weight    04/29/21 1554   Weight: (!) 39.4 kg (86 lb 15.5 oz)       Physical Exam   Constitutional: He appears well-developed and well-nourished.   HENT:   Nose: Nose normal. No nasal discharge.   Mouth/Throat: Mucous membranes are moist. Oropharynx is clear. Pharynx is normal.   Eyes: Pupils are equal, round, and reactive to light. Conjunctivae and EOM are normal.   Cardiovascular: Normal rate, regular rhythm, S1 normal and S2 normal.   No murmur heard.  Pulmonary/Chest: Effort normal and breath sounds normal. No nasal flaring. No respiratory distress. He exhibits no retraction.   Abdominal: Soft. Bowel sounds are normal. He exhibits no distension. There is no abdominal tenderness.   Musculoskeletal:         General: No edema. Normal range of motion.      Right shoulder: No swelling.      Cervical back: Normal range of motion and neck supple.   Neurological: He is alert.   Skin: Skin is warm. Capillary refill takes less than 3 seconds.   Nursing note and vitals reviewed.      5/16/16: Sodium 140, potassium 4.6, chloride 104, bicarb 27, BUN 17, creatinine 0.41, calcium 10.0, glucose 96  1/9/18: Protein/creatinine 0.18    1/31/2019:  Sodium 139, potassium 4.3, chloride 106, bicarb 25, BUN 17, creatinine 0.33, calcium 10.3, glucose 86  Urine protein/creatinine = 0.03  UA: 1.016, pH 7.0, otherwise negative    4/20/2021:  Sodium 137, potassium 3.8, chloride 106, bicarb 24, BUN 15, creatinine 0.40, calcium 9.5, glucose 127  WBC 10.5, hemoglobin 12.4, platelets 437  CRP <0.3    11/21/14: [facility]: Negative  1/6/17: Renal  ultrasound, solitary kidney 7.74 cm.  12/28/18: Renal ultrasound: Right kidney 8.5 cm, left kidney absent.  Normal appearance of solitary right kidney    Assessment/Plan:  Dany is a 7 year old male with solitary kidney and remote history of urinary tract infection  Previously discussed the general role, function and anatomy of the renal system with the family. Appropriate interval growth of solitary kidney, and no new concerns.    Problem List Items Addressed This Visit        Urinary    Solitary kidney - Primary     -No serum testing today is recently performed  -UA, urine protein, urine Creatinine; if elevated urine protein/creatinine ratio will repeat on first morning void  -Please check blood pressure on clinical visit; notify nephrology persistently elevated  -Renal ultrasound to be performed  -Please notify nephrology if develops UTI, gross hematuria, dysuria, or edema  -Avoid nephrotoxic medications  -Avoid full contact sports  -Follow-up in approximately 1-1.5 years time         Relevant Orders    US KIDNEY(S) AND BLADDER URINARY TRACT    PROTEIN/CREATININE RATIO, URINE    URINALYSIS WITH MICROSCOPY EXAM W/O C/S        Return in about 1 year (around 4/29/2022).        Queenie Flanagan MD   EOMI; PERRL; no drainage or redness

## 2021-05-07 DIAGNOSIS — Z01.818 ENCOUNTER FOR OTHER PREPROCEDURAL EXAMINATION: ICD-10-CM

## 2021-05-08 ENCOUNTER — APPOINTMENT (OUTPATIENT)
Dept: DISASTER EMERGENCY | Facility: CLINIC | Age: 31
End: 2021-05-08

## 2021-05-09 LAB — SARS-COV-2 N GENE NPH QL NAA+PROBE: NOT DETECTED

## 2021-05-10 ENCOUNTER — TRANSCRIPTION ENCOUNTER (OUTPATIENT)
Age: 31
End: 2021-05-10

## 2021-05-11 ENCOUNTER — INPATIENT (INPATIENT)
Facility: HOSPITAL | Age: 31
LOS: 2 days | Discharge: HOME | DRG: 671 | End: 2021-05-14
Attending: UROLOGY | Admitting: UROLOGY
Payer: MEDICAID

## 2021-05-11 ENCOUNTER — APPOINTMENT (OUTPATIENT)
Dept: UROLOGY | Facility: HOSPITAL | Age: 31
End: 2021-05-11
Payer: MEDICAID

## 2021-05-11 ENCOUNTER — RESULT REVIEW (OUTPATIENT)
Age: 31
End: 2021-05-11

## 2021-05-11 VITALS
RESPIRATION RATE: 16 BRPM | DIASTOLIC BLOOD PRESSURE: 68 MMHG | HEIGHT: 58 IN | SYSTOLIC BLOOD PRESSURE: 111 MMHG | WEIGHT: 149.91 LBS | TEMPERATURE: 98 F | OXYGEN SATURATION: 99 % | HEART RATE: 85 BPM

## 2021-05-11 DIAGNOSIS — N36.8 OTHER SPECIFIED DISORDERS OF URETHRA: ICD-10-CM

## 2021-05-11 DIAGNOSIS — Z87.42 PERSONAL HISTORY OF OTHER DISEASES OF THE FEMALE GENITAL TRACT: Chronic | ICD-10-CM

## 2021-05-11 DIAGNOSIS — Z98.89 OTHER SPECIFIED POSTPROCEDURAL STATES: Chronic | ICD-10-CM

## 2021-05-11 DIAGNOSIS — S62.309A UNSPECIFIED FRACTURE OF UNSPECIFIED METACARPAL BONE, INITIAL ENCOUNTER FOR CLOSED FRACTURE: Chronic | ICD-10-CM

## 2021-05-11 LAB
BLD GP AB SCN SERPL QL: SIGNIFICANT CHANGE UP
HCG UR QL: NEGATIVE — SIGNIFICANT CHANGE UP
HCT VFR BLD CALC: 25.7 % — LOW (ref 34.5–45)
HCT VFR BLD CALC: 31 % — LOW (ref 34.5–45)
HGB BLD-MCNC: 10.6 G/DL — LOW (ref 11.5–15.5)
HGB BLD-MCNC: 8.6 G/DL — LOW (ref 11.5–15.5)
MCHC RBC-ENTMCNC: 32.7 PG — SIGNIFICANT CHANGE UP (ref 27–34)
MCHC RBC-ENTMCNC: 33.4 PG — SIGNIFICANT CHANGE UP (ref 27–34)
MCHC RBC-ENTMCNC: 33.5 GM/DL — SIGNIFICANT CHANGE UP (ref 32–36)
MCHC RBC-ENTMCNC: 34.2 GM/DL — SIGNIFICANT CHANGE UP (ref 32–36)
MCV RBC AUTO: 97.7 FL — SIGNIFICANT CHANGE UP (ref 80–100)
MCV RBC AUTO: 97.8 FL — SIGNIFICANT CHANGE UP (ref 80–100)
NRBC # BLD: 0 /100 WBCS — SIGNIFICANT CHANGE UP (ref 0–0)
NRBC # BLD: 0 /100 WBCS — SIGNIFICANT CHANGE UP (ref 0–0)
PLATELET # BLD AUTO: 229 K/UL — SIGNIFICANT CHANGE UP (ref 150–400)
PLATELET # BLD AUTO: 268 K/UL — SIGNIFICANT CHANGE UP (ref 150–400)
RBC # BLD: 2.63 M/UL — LOW (ref 3.8–5.2)
RBC # BLD: 3.17 M/UL — LOW (ref 3.8–5.2)
RBC # FLD: 11 % — SIGNIFICANT CHANGE UP (ref 10.3–14.5)
RBC # FLD: 11.2 % — SIGNIFICANT CHANGE UP (ref 10.3–14.5)
WBC # BLD: 13.39 K/UL — HIGH (ref 3.8–10.5)
WBC # BLD: 9.61 K/UL — SIGNIFICANT CHANGE UP (ref 3.8–10.5)
WBC # FLD AUTO: 13.39 K/UL — HIGH (ref 3.8–10.5)
WBC # FLD AUTO: 9.61 K/UL — SIGNIFICANT CHANGE UP (ref 3.8–10.5)

## 2021-05-11 PROCEDURE — A4216: CPT | Mod: NC

## 2021-05-11 PROCEDURE — 51700 IRRIGATION OF BLADDER: CPT

## 2021-05-11 PROCEDURE — 88307 TISSUE EXAM BY PATHOLOGIST: CPT | Mod: 26

## 2021-05-11 PROCEDURE — 99213 OFFICE O/P EST LOW 20 MIN: CPT | Mod: 25

## 2021-05-11 PROCEDURE — 53260 TREATMENT OF URETHRA LESION: CPT

## 2021-05-11 RX ORDER — SPIRONOLACTONE 25 MG/1
1 TABLET, FILM COATED ORAL
Qty: 0 | Refills: 0 | DISCHARGE

## 2021-05-11 RX ORDER — ACETAMINOPHEN 500 MG
1000 TABLET ORAL ONCE
Refills: 0 | Status: COMPLETED | OUTPATIENT
Start: 2021-05-11 | End: 2021-05-12

## 2021-05-11 RX ORDER — SODIUM CHLORIDE 9 MG/ML
500 INJECTION, SOLUTION INTRAVENOUS ONCE
Refills: 0 | Status: COMPLETED | OUTPATIENT
Start: 2021-05-11 | End: 2021-05-11

## 2021-05-11 RX ORDER — METFORMIN HYDROCHLORIDE 850 MG/1
1 TABLET ORAL
Qty: 0 | Refills: 0 | DISCHARGE

## 2021-05-11 RX ORDER — FENTANYL CITRATE 50 UG/ML
50 INJECTION INTRAVENOUS
Refills: 0 | Status: DISCONTINUED | OUTPATIENT
Start: 2021-05-11 | End: 2021-05-11

## 2021-05-11 RX ORDER — FENTANYL CITRATE 50 UG/ML
25 INJECTION INTRAVENOUS
Refills: 0 | Status: DISCONTINUED | OUTPATIENT
Start: 2021-05-11 | End: 2021-05-11

## 2021-05-11 RX ORDER — CIPROFLOXACIN LACTATE 400MG/40ML
VIAL (ML) INTRAVENOUS
Refills: 0 | Status: DISCONTINUED | OUTPATIENT
Start: 2021-05-11 | End: 2021-05-14

## 2021-05-11 RX ORDER — OXYCODONE HYDROCHLORIDE 5 MG/1
5 TABLET ORAL ONCE
Refills: 0 | Status: DISCONTINUED | OUTPATIENT
Start: 2021-05-11 | End: 2021-05-11

## 2021-05-11 RX ORDER — CIPROFLOXACIN LACTATE 400MG/40ML
400 VIAL (ML) INTRAVENOUS EVERY 12 HOURS
Refills: 0 | Status: DISCONTINUED | OUTPATIENT
Start: 2021-05-12 | End: 2021-05-14

## 2021-05-11 RX ORDER — MORPHINE SULFATE 50 MG/1
2 CAPSULE, EXTENDED RELEASE ORAL EVERY 4 HOURS
Refills: 0 | Status: DISCONTINUED | OUTPATIENT
Start: 2021-05-11 | End: 2021-05-11

## 2021-05-11 RX ORDER — OXYCODONE HYDROCHLORIDE 5 MG/1
10 TABLET ORAL ONCE
Refills: 0 | Status: DISCONTINUED | OUTPATIENT
Start: 2021-05-11 | End: 2021-05-11

## 2021-05-11 RX ORDER — CIPROFLOXACIN LACTATE 400MG/40ML
400 VIAL (ML) INTRAVENOUS ONCE
Refills: 0 | Status: COMPLETED | OUTPATIENT
Start: 2021-05-11 | End: 2021-05-11

## 2021-05-11 RX ORDER — OXYCODONE AND ACETAMINOPHEN 5; 325 MG/1; MG/1
1 TABLET ORAL EVERY 4 HOURS
Refills: 0 | Status: DISCONTINUED | OUTPATIENT
Start: 2021-05-11 | End: 2021-05-11

## 2021-05-11 RX ORDER — OXYCODONE AND ACETAMINOPHEN 5; 325 MG/1; MG/1
2 TABLET ORAL EVERY 6 HOURS
Refills: 0 | Status: DISCONTINUED | OUTPATIENT
Start: 2021-05-11 | End: 2021-05-11

## 2021-05-11 RX ORDER — MOXIFLOXACIN HYDROCHLORIDE TABLETS, 400 MG 400 MG/1
1 TABLET, FILM COATED ORAL
Qty: 20 | Refills: 0
Start: 2021-05-11 | End: 2021-05-20

## 2021-05-11 RX ORDER — SODIUM CHLORIDE 9 MG/ML
1000 INJECTION INTRAMUSCULAR; INTRAVENOUS; SUBCUTANEOUS ONCE
Refills: 0 | Status: COMPLETED | OUTPATIENT
Start: 2021-05-11 | End: 2021-05-11

## 2021-05-11 RX ORDER — SODIUM CHLORIDE 9 MG/ML
500 INJECTION INTRAMUSCULAR; INTRAVENOUS; SUBCUTANEOUS ONCE
Refills: 0 | Status: DISCONTINUED | OUTPATIENT
Start: 2021-05-11 | End: 2021-05-14

## 2021-05-11 RX ORDER — ACETAMINOPHEN 500 MG
975 TABLET ORAL ONCE
Refills: 0 | Status: COMPLETED | OUTPATIENT
Start: 2021-05-11 | End: 2021-05-11

## 2021-05-11 RX ORDER — HEPARIN SODIUM 5000 [USP'U]/ML
5000 INJECTION INTRAVENOUS; SUBCUTANEOUS EVERY 8 HOURS
Refills: 0 | Status: DISCONTINUED | OUTPATIENT
Start: 2021-05-11 | End: 2021-05-13

## 2021-05-11 RX ORDER — DEXTROSE MONOHYDRATE, SODIUM CHLORIDE, AND POTASSIUM CHLORIDE 50; .745; 4.5 G/1000ML; G/1000ML; G/1000ML
1000 INJECTION, SOLUTION INTRAVENOUS
Refills: 0 | Status: DISCONTINUED | OUTPATIENT
Start: 2021-05-11 | End: 2021-05-14

## 2021-05-11 RX ADMIN — FENTANYL CITRATE 25 MICROGRAM(S): 50 INJECTION INTRAVENOUS at 13:56

## 2021-05-11 RX ADMIN — Medication 200 MILLIGRAM(S): at 17:50

## 2021-05-11 RX ADMIN — Medication 975 MILLIGRAM(S): at 17:30

## 2021-05-11 RX ADMIN — SODIUM CHLORIDE 1000 MILLILITER(S): 9 INJECTION INTRAMUSCULAR; INTRAVENOUS; SUBCUTANEOUS at 18:03

## 2021-05-11 RX ADMIN — HEPARIN SODIUM 5000 UNIT(S): 5000 INJECTION INTRAVENOUS; SUBCUTANEOUS at 21:54

## 2021-05-11 RX ADMIN — DEXTROSE MONOHYDRATE, SODIUM CHLORIDE, AND POTASSIUM CHLORIDE 120 MILLILITER(S): 50; .745; 4.5 INJECTION, SOLUTION INTRAVENOUS at 21:54

## 2021-05-11 RX ADMIN — SODIUM CHLORIDE 500 MILLILITER(S): 9 INJECTION, SOLUTION INTRAVENOUS at 20:30

## 2021-05-11 RX ADMIN — FENTANYL CITRATE 25 MICROGRAM(S): 50 INJECTION INTRAVENOUS at 13:45

## 2021-05-11 RX ADMIN — FENTANYL CITRATE 50 MICROGRAM(S): 50 INJECTION INTRAVENOUS at 14:00

## 2021-05-11 RX ADMIN — MORPHINE SULFATE 2 MILLIGRAM(S): 50 CAPSULE, EXTENDED RELEASE ORAL at 18:30

## 2021-05-11 RX ADMIN — MORPHINE SULFATE 2 MILLIGRAM(S): 50 CAPSULE, EXTENDED RELEASE ORAL at 18:04

## 2021-05-11 RX ADMIN — Medication 975 MILLIGRAM(S): at 16:54

## 2021-05-11 NOTE — BRIEF OPERATIVE NOTE - NSICDXBRIEFPROCEDURE_GEN_ALL_CORE_FT
PROCEDURES:  Excision of vaginal cyst 11-May-2021 13:13:29  Latesha Mckinney  Cystoscopy 11-May-2021 13:14:23  Latesha Mckinney

## 2021-05-11 NOTE — ASU DISCHARGE PLAN (ADULT/PEDIATRIC) - "IF YOU OR YOUR GUARDIAN/FAMILY IS A SMOKER, IT IS IMPORTANT FOR YOUR HEALTH TO STOP SMOKING. PLEASE BE AWARE THAT SECOND HAND SMOKE IS ALSO HARMFUL."
Order signed. Daja Estrada. Rockne Najjar, MD  Diplomate, [employer] Board of Internal Medicine  [facility]  [address] [facility], [address], [address]  T: [phone]; F: Willeti 5 Statement Selected

## 2021-05-11 NOTE — ASU PATIENT PROFILE, ADULT - PMH
Appendicitis    Asthma    Endometriosis    History of PCOS    Obesity    Ovarian cyst    Umbilical hernia

## 2021-05-11 NOTE — ASU DISCHARGE PLAN (ADULT/PEDIATRIC) - CARE PROVIDER_API CALL
Latesha Mckinney (MD; MPH)  Urology  [address] [facility] [address] [place] [address]  Phone: [phone]  Fax: [phone]  Follow Up Time:

## 2021-05-11 NOTE — ASU DISCHARGE PLAN (ADULT/PEDIATRIC) - ASU DC SPECIAL INSTRUCTIONSFT
Call the office if you have fever greater than 101, pain not relieved with pain medication, nausea/vomiting.    You may have blood in your urine intermittently for the next few days to weeks - this is normal.    You may take up to 650mg of tylenol every 6 hours for pain.  You can alternate this with ibuprofen 400mg every 6 hours.  Do not take more than 4000mg of tylenol or 2400mg of ibuprofen in one day.    We have sent antibiotics to your pharmacy - please take as prescribed.    Please call Dr. Mckinney's office to schedule follow-up.  Your gaytan catheter and vaginal packing should stay in until you follow-up with Dr. Mckinney.

## 2021-05-12 LAB
ANION GAP SERPL CALC-SCNC: 6 MMOL/L — SIGNIFICANT CHANGE UP (ref 5–17)
BUN SERPL-MCNC: 6 MG/DL — LOW (ref 7–18)
CALCIUM SERPL-MCNC: 7.7 MG/DL — LOW (ref 8.4–10.5)
CHLORIDE SERPL-SCNC: 112 MMOL/L — HIGH (ref 96–108)
CO2 SERPL-SCNC: 24 MMOL/L — SIGNIFICANT CHANGE UP (ref 22–31)
COVID-19 SPIKE DOMAIN AB INTERP: POSITIVE
COVID-19 SPIKE DOMAIN ANTIBODY RESULT: >250 U/ML — HIGH
CREAT SERPL-MCNC: 0.39 MG/DL — LOW (ref 0.5–1.3)
GLUCOSE SERPL-MCNC: 120 MG/DL — HIGH (ref 70–99)
HCT VFR BLD CALC: 24.3 % — LOW (ref 34.5–45)
HCT VFR BLD CALC: 24.6 % — LOW (ref 34.5–45)
HGB BLD-MCNC: 8.1 G/DL — LOW (ref 11.5–15.5)
HGB BLD-MCNC: 8.2 G/DL — LOW (ref 11.5–15.5)
MCHC RBC-ENTMCNC: 32.9 PG — SIGNIFICANT CHANGE UP (ref 27–34)
MCHC RBC-ENTMCNC: 33.1 PG — SIGNIFICANT CHANGE UP (ref 27–34)
MCHC RBC-ENTMCNC: 33.3 GM/DL — SIGNIFICANT CHANGE UP (ref 32–36)
MCHC RBC-ENTMCNC: 33.3 GM/DL — SIGNIFICANT CHANGE UP (ref 32–36)
MCV RBC AUTO: 98.8 FL — SIGNIFICANT CHANGE UP (ref 80–100)
MCV RBC AUTO: 99.2 FL — SIGNIFICANT CHANGE UP (ref 80–100)
NRBC # BLD: 0 /100 WBCS — SIGNIFICANT CHANGE UP (ref 0–0)
NRBC # BLD: 0 /100 WBCS — SIGNIFICANT CHANGE UP (ref 0–0)
PLATELET # BLD AUTO: 235 K/UL — SIGNIFICANT CHANGE UP (ref 150–400)
PLATELET # BLD AUTO: 239 K/UL — SIGNIFICANT CHANGE UP (ref 150–400)
POTASSIUM SERPL-MCNC: 3.6 MMOL/L — SIGNIFICANT CHANGE UP (ref 3.5–5.3)
POTASSIUM SERPL-SCNC: 3.6 MMOL/L — SIGNIFICANT CHANGE UP (ref 3.5–5.3)
RBC # BLD: 2.45 M/UL — LOW (ref 3.8–5.2)
RBC # BLD: 2.49 M/UL — LOW (ref 3.8–5.2)
RBC # FLD: 11.3 % — SIGNIFICANT CHANGE UP (ref 10.3–14.5)
RBC # FLD: 11.6 % — SIGNIFICANT CHANGE UP (ref 10.3–14.5)
SARS-COV-2 IGG+IGM SERPL QL IA: >250 U/ML — HIGH
SARS-COV-2 IGG+IGM SERPL QL IA: POSITIVE
SODIUM SERPL-SCNC: 142 MMOL/L — SIGNIFICANT CHANGE UP (ref 135–145)
WBC # BLD: 11.61 K/UL — HIGH (ref 3.8–10.5)
WBC # BLD: 12.71 K/UL — HIGH (ref 3.8–10.5)
WBC # FLD AUTO: 11.61 K/UL — HIGH (ref 3.8–10.5)
WBC # FLD AUTO: 12.71 K/UL — HIGH (ref 3.8–10.5)

## 2021-05-12 PROCEDURE — 99024 POSTOP FOLLOW-UP VISIT: CPT | Mod: AI

## 2021-05-12 RX ORDER — MIDODRINE HYDROCHLORIDE 2.5 MG/1
20 TABLET ORAL ONCE
Refills: 0 | Status: COMPLETED | OUTPATIENT
Start: 2021-05-12 | End: 2021-05-12

## 2021-05-12 RX ORDER — ACETAMINOPHEN 500 MG
1000 TABLET ORAL ONCE
Refills: 0 | Status: DISCONTINUED | OUTPATIENT
Start: 2021-05-12 | End: 2021-05-14

## 2021-05-12 RX ORDER — KETOROLAC TROMETHAMINE 30 MG/ML
15 SYRINGE (ML) INJECTION EVERY 6 HOURS
Refills: 0 | Status: DISCONTINUED | OUTPATIENT
Start: 2021-05-12 | End: 2021-05-14

## 2021-05-12 RX ADMIN — MIDODRINE HYDROCHLORIDE 20 MILLIGRAM(S): 2.5 TABLET ORAL at 01:04

## 2021-05-12 RX ADMIN — DEXTROSE MONOHYDRATE, SODIUM CHLORIDE, AND POTASSIUM CHLORIDE 120 MILLILITER(S): 50; .745; 4.5 INJECTION, SOLUTION INTRAVENOUS at 05:57

## 2021-05-12 RX ADMIN — Medication 200 MILLIGRAM(S): at 17:39

## 2021-05-12 RX ADMIN — Medication 1000 MILLIGRAM(S): at 11:45

## 2021-05-12 RX ADMIN — Medication 15 MILLIGRAM(S): at 15:04

## 2021-05-12 RX ADMIN — Medication 400 MILLIGRAM(S): at 11:26

## 2021-05-12 RX ADMIN — DEXTROSE MONOHYDRATE, SODIUM CHLORIDE, AND POTASSIUM CHLORIDE 120 MILLILITER(S): 50; .745; 4.5 INJECTION, SOLUTION INTRAVENOUS at 19:28

## 2021-05-12 RX ADMIN — HEPARIN SODIUM 5000 UNIT(S): 5000 INJECTION INTRAVENOUS; SUBCUTANEOUS at 05:44

## 2021-05-12 RX ADMIN — HEPARIN SODIUM 5000 UNIT(S): 5000 INJECTION INTRAVENOUS; SUBCUTANEOUS at 21:33

## 2021-05-12 RX ADMIN — Medication 15 MILLIGRAM(S): at 15:20

## 2021-05-12 RX ADMIN — Medication 200 MILLIGRAM(S): at 05:44

## 2021-05-12 NOTE — PROGRESS NOTE ADULT - ATTENDING COMMENTS
Pt seen and examined. Agree with note as written above.    29 yo F s/p periurethral cyst excision, hypotensive overnight    - Plan as above

## 2021-05-13 LAB
BASOPHILS # BLD AUTO: 0.05 K/UL — SIGNIFICANT CHANGE UP (ref 0–0.2)
BASOPHILS NFR BLD AUTO: 0.5 % — SIGNIFICANT CHANGE UP (ref 0–2)
EOSINOPHIL # BLD AUTO: 0.19 K/UL — SIGNIFICANT CHANGE UP (ref 0–0.5)
EOSINOPHIL NFR BLD AUTO: 2 % — SIGNIFICANT CHANGE UP (ref 0–6)
HCT VFR BLD CALC: 21.2 % — LOW (ref 34.5–45)
HCT VFR BLD CALC: 28.4 % — LOW (ref 34.5–45)
HGB BLD-MCNC: 6.9 G/DL — CRITICAL LOW (ref 11.5–15.5)
HGB BLD-MCNC: 9.6 G/DL — LOW (ref 11.5–15.5)
IMM GRANULOCYTES NFR BLD AUTO: 0.6 % — SIGNIFICANT CHANGE UP (ref 0–1.5)
LYMPHOCYTES # BLD AUTO: 4.43 K/UL — HIGH (ref 1–3.3)
LYMPHOCYTES # BLD AUTO: 47.6 % — HIGH (ref 13–44)
MCHC RBC-ENTMCNC: 32.5 GM/DL — SIGNIFICANT CHANGE UP (ref 32–36)
MCHC RBC-ENTMCNC: 32.7 PG — SIGNIFICANT CHANGE UP (ref 27–34)
MCHC RBC-ENTMCNC: 32.9 PG — SIGNIFICANT CHANGE UP (ref 27–34)
MCHC RBC-ENTMCNC: 33.8 GM/DL — SIGNIFICANT CHANGE UP (ref 32–36)
MCV RBC AUTO: 101 FL — HIGH (ref 80–100)
MCV RBC AUTO: 96.6 FL — SIGNIFICANT CHANGE UP (ref 80–100)
MONOCYTES # BLD AUTO: 0.62 K/UL — SIGNIFICANT CHANGE UP (ref 0–0.9)
MONOCYTES NFR BLD AUTO: 6.7 % — SIGNIFICANT CHANGE UP (ref 2–14)
NEUTROPHILS # BLD AUTO: 3.95 K/UL — SIGNIFICANT CHANGE UP (ref 1.8–7.4)
NEUTROPHILS NFR BLD AUTO: 42.6 % — LOW (ref 43–77)
NRBC # BLD: 0 /100 WBCS — SIGNIFICANT CHANGE UP (ref 0–0)
NRBC # BLD: 0 /100 WBCS — SIGNIFICANT CHANGE UP (ref 0–0)
PLATELET # BLD AUTO: 125 K/UL — LOW (ref 150–400)
PLATELET # BLD AUTO: 193 K/UL — SIGNIFICANT CHANGE UP (ref 150–400)
RBC # BLD: 2.1 M/UL — LOW (ref 3.8–5.2)
RBC # BLD: 2.94 M/UL — LOW (ref 3.8–5.2)
RBC # FLD: 11.8 % — SIGNIFICANT CHANGE UP (ref 10.3–14.5)
RBC # FLD: 14.1 % — SIGNIFICANT CHANGE UP (ref 10.3–14.5)
WBC # BLD: 8.21 K/UL — SIGNIFICANT CHANGE UP (ref 3.8–10.5)
WBC # BLD: 9.3 K/UL — SIGNIFICANT CHANGE UP (ref 3.8–10.5)
WBC # FLD AUTO: 8.21 K/UL — SIGNIFICANT CHANGE UP (ref 3.8–10.5)
WBC # FLD AUTO: 9.3 K/UL — SIGNIFICANT CHANGE UP (ref 3.8–10.5)

## 2021-05-13 PROCEDURE — 99024 POSTOP FOLLOW-UP VISIT: CPT | Mod: AI

## 2021-05-13 RX ORDER — SODIUM CHLORIDE 9 MG/ML
1000 INJECTION INTRAMUSCULAR; INTRAVENOUS; SUBCUTANEOUS ONCE
Refills: 0 | Status: COMPLETED | OUTPATIENT
Start: 2021-05-13 | End: 2021-05-13

## 2021-05-13 RX ADMIN — Medication 200 MILLIGRAM(S): at 06:09

## 2021-05-13 RX ADMIN — Medication 15 MILLIGRAM(S): at 00:36

## 2021-05-13 RX ADMIN — HEPARIN SODIUM 5000 UNIT(S): 5000 INJECTION INTRAVENOUS; SUBCUTANEOUS at 06:09

## 2021-05-13 RX ADMIN — Medication 15 MILLIGRAM(S): at 00:50

## 2021-05-13 RX ADMIN — HEPARIN SODIUM 5000 UNIT(S): 5000 INJECTION INTRAVENOUS; SUBCUTANEOUS at 13:02

## 2021-05-13 RX ADMIN — Medication 200 MILLIGRAM(S): at 17:16

## 2021-05-13 RX ADMIN — SODIUM CHLORIDE 1000 MILLILITER(S): 9 INJECTION INTRAMUSCULAR; INTRAVENOUS; SUBCUTANEOUS at 12:24

## 2021-05-14 ENCOUNTER — TRANSCRIPTION ENCOUNTER (OUTPATIENT)
Age: 31
End: 2021-05-14

## 2021-05-14 VITALS
SYSTOLIC BLOOD PRESSURE: 101 MMHG | HEART RATE: 87 BPM | DIASTOLIC BLOOD PRESSURE: 61 MMHG | RESPIRATION RATE: 18 BRPM | OXYGEN SATURATION: 99 % | TEMPERATURE: 98 F

## 2021-05-14 LAB
ANION GAP SERPL CALC-SCNC: 8 MMOL/L — SIGNIFICANT CHANGE UP (ref 5–17)
BUN SERPL-MCNC: 7 MG/DL — SIGNIFICANT CHANGE UP (ref 7–18)
CALCIUM SERPL-MCNC: 8.2 MG/DL — LOW (ref 8.4–10.5)
CHLORIDE SERPL-SCNC: 105 MMOL/L — SIGNIFICANT CHANGE UP (ref 96–108)
CO2 SERPL-SCNC: 27 MMOL/L — SIGNIFICANT CHANGE UP (ref 22–31)
CREAT SERPL-MCNC: 0.57 MG/DL — SIGNIFICANT CHANGE UP (ref 0.5–1.3)
GLUCOSE SERPL-MCNC: 101 MG/DL — HIGH (ref 70–99)
HCT VFR BLD CALC: 26.6 % — LOW (ref 34.5–45)
HGB BLD-MCNC: 9 G/DL — LOW (ref 11.5–15.5)
MCHC RBC-ENTMCNC: 32.3 PG — SIGNIFICANT CHANGE UP (ref 27–34)
MCHC RBC-ENTMCNC: 33.8 GM/DL — SIGNIFICANT CHANGE UP (ref 32–36)
MCV RBC AUTO: 95.3 FL — SIGNIFICANT CHANGE UP (ref 80–100)
NRBC # BLD: 0 /100 WBCS — SIGNIFICANT CHANGE UP (ref 0–0)
PLATELET # BLD AUTO: 221 K/UL — SIGNIFICANT CHANGE UP (ref 150–400)
POTASSIUM SERPL-MCNC: 3.9 MMOL/L — SIGNIFICANT CHANGE UP (ref 3.5–5.3)
POTASSIUM SERPL-SCNC: 3.9 MMOL/L — SIGNIFICANT CHANGE UP (ref 3.5–5.3)
RBC # BLD: 2.79 M/UL — LOW (ref 3.8–5.2)
RBC # FLD: 14.6 % — HIGH (ref 10.3–14.5)
SODIUM SERPL-SCNC: 140 MMOL/L — SIGNIFICANT CHANGE UP (ref 135–145)
WBC # BLD: 10.05 K/UL — SIGNIFICANT CHANGE UP (ref 3.8–10.5)
WBC # FLD AUTO: 10.05 K/UL — SIGNIFICANT CHANGE UP (ref 3.8–10.5)

## 2021-05-14 PROCEDURE — 80048 BASIC METABOLIC PNL TOTAL CA: CPT

## 2021-05-14 PROCEDURE — 99024 POSTOP FOLLOW-UP VISIT: CPT | Mod: AI

## 2021-05-14 PROCEDURE — 36430 TRANSFUSION BLD/BLD COMPNT: CPT

## 2021-05-14 PROCEDURE — 36415 COLL VENOUS BLD VENIPUNCTURE: CPT

## 2021-05-14 PROCEDURE — 86923 COMPATIBILITY TEST ELECTRIC: CPT

## 2021-05-14 PROCEDURE — P9040: CPT

## 2021-05-14 PROCEDURE — 88307 TISSUE EXAM BY PATHOLOGIST: CPT

## 2021-05-14 PROCEDURE — 85025 COMPLETE CBC W/AUTO DIFF WBC: CPT

## 2021-05-14 PROCEDURE — 86769 SARS-COV-2 COVID-19 ANTIBODY: CPT

## 2021-05-14 PROCEDURE — 81025 URINE PREGNANCY TEST: CPT

## 2021-05-14 PROCEDURE — 86901 BLOOD TYPING SEROLOGIC RH(D): CPT

## 2021-05-14 PROCEDURE — C1889: CPT

## 2021-05-14 PROCEDURE — 85027 COMPLETE CBC AUTOMATED: CPT

## 2021-05-14 PROCEDURE — 86900 BLOOD TYPING SEROLOGIC ABO: CPT

## 2021-05-14 PROCEDURE — 86850 RBC ANTIBODY SCREEN: CPT

## 2021-05-14 RX ORDER — CIPROFLOXACIN LACTATE 400MG/40ML
0 VIAL (ML) INTRAVENOUS
Qty: 0 | Refills: 0 | DISCHARGE
Start: 2021-05-14

## 2021-05-14 RX ADMIN — Medication 15 MILLIGRAM(S): at 10:15

## 2021-05-14 RX ADMIN — Medication 200 MILLIGRAM(S): at 05:44

## 2021-05-14 RX ADMIN — Medication 15 MILLIGRAM(S): at 09:54

## 2021-05-14 NOTE — PROGRESS NOTE ADULT - SUBJECTIVE AND OBJECTIVE BOX
INTERVAL HPI/OVERNIGHT EVENTS:    Pt s/p urethral diverticulectomy 5/11. Called by nursing staff, pt w/ near syncopal episode while trying to ambulate.  Pt seen and examined at bedside. Admits to mild lower abd pain, no nausea or vomiting, no dizziness/ lightheadedness.       Vital Signs Last 24 Hrs  T(C): 36.7 (11 May 2021 16:05), Max: 36.7 (11 May 2021 08:20)  T(F): 98 (11 May 2021 16:05), Max: 98.1 (11 May 2021 08:20)  HR: 89 (11 May 2021 18:45) (77 - 130)  BP: 84/45 (11 May 2021 18:45) (80/35 - 111/81)  BP(mean): 66 (11 May 2021 14:45) (57 - 79)  RR: 17 (11 May 2021 16:05) (10 - 18)  SpO2: 98% (11 May 2021 16:05) (98% - 100%)  I&O's Detail    11 May 2021 07:01  -  11 May 2021 19:07  --------------------------------------------------------  IN:    Lactated Ringers Bolus: 1200 mL  Total IN: 1200 mL    OUT:    Estimated Blood Loss (mL): 700 mL    Indwelling Catheter - Urethral (mL): 50 mL  Total OUT: 750 mL    Total NET: 450 mL        ciprofloxacin   IVPB          Physical Exam  General: AAOx3, No acute distress  Abdomen: soft, nondistended, mild lower abd TTP, no rebound tenderness, no guarding, no palpable masses  : Normal external genitalia, vaginal packing in place, no active bleed; Folet Catheter in place, UO yellow and clear   Extremities: non edematous, no calf pain bilaterally        Labs:                        10.6   13.39 )-----------( 268      ( 11 May 2021 13:41 )             31.0               
Interval Events:    No acute events overnight    S: Patient doing well, denies fevers, chills, nausea, emesis, chest pain, SOB.  Pain is well controlled. Tolerating PO w/o N/V.  +/+ F/BM.    O: Vital Signs Last 24 Hrs  T(C): 36.7 (14 May 2021 05:56), Max: 37.1 (13 May 2021 18:12)  T(F): 98.1 (14 May 2021 05:56), Max: 98.8 (13 May 2021 18:12)  HR: 101 (14 May 2021 05:56) (86 - 101)  BP: 90/54 (14 May 2021 05:56) (90/54 - 115/74)  BP(mean): 63 (13 May 2021 18:12) (63 - 83)  RR: 18 (14 May 2021 05:56) (17 - 20)  SpO2: 99% (14 May 2021 05:56) (99% - 100%)      13 May 2021 07:01  -  14 May 2021 07:00  --------------------------------------------------------  IN:    dextrose 5% + sodium chloride 0.9% w/ Additives: 840 mL    Sodium Chloride 0.9% Bolus: 1000 mL  Total IN: 1840 mL    OUT:    Indwelling Catheter - Urethral (mL): 4200 mL  Total OUT: 4200 mL    Total NET: -2360 mL          Physical Exam:    Gen: Well-developed, well-nourished in no acute distress  Resp: No additional work of breathing   GI: Soft, non-tender, non-distended, with normoactive bowel sounds.  No masses.  : gaytan in place draining clear urine  MSK: Moves all extremities equally  Skin: No rashes    Labs:                        9.0    10.05 )-----------( 221      ( 14 May 2021 06:25 )             26.6     14 May 2021 06:25    140    |  105    |  7      ----------------------------<  101    3.9     |  27     |  0.57     Ca    8.2        14 May 2021 06:25        CAPILLARY BLOOD GLUCOSE                    MEDICATIONS  (STANDING):  acetaminophen  IVPB .. 1000 milliGRAM(s) IV Intermittent once  ciprofloxacin   IVPB      ciprofloxacin   IVPB 400 milliGRAM(s) IV Intermittent every 12 hours  dextrose 5% + sodium chloride 0.9% with potassium chloride 20 mEq/L 1000 milliLiter(s) (120 mL/Hr) IV Continuous <Continuous>  sodium chloride 0.9% Bolus 500 milliLiter(s) IV Bolus once    MEDICATIONS  (PRN):  ketorolac   Injectable 15 milliGRAM(s) IV Push every 6 hours PRN Moderate Pain (4 - 6)  
INTERVAL HPI/OVERNIGHT EVENTS:    Pt seen and examined at bedside. States pain is well managed, denies nausea or vomiting, BM this AM, noticed some spotting from vagina.   Feels tired/ weak.       Vital Signs Last 24 Hrs  T(C): 36.9 (13 May 2021 05:16), Max: 36.9 (13 May 2021 05:16)  T(F): 98.4 (13 May 2021 05:16), Max: 98.4 (13 May 2021 05:16)  HR: 85 (13 May 2021 05:16) (77 - 94)  BP: 102/51 (13 May 2021 08:27) (92/54 - 102/51)  BP(mean): 67 (12 May 2021 22:30) (67 - 67)  RR: 18 (13 May 2021 08:27) (16 - 18)  SpO2: 100% (13 May 2021 08:27) (99% - 100%)  I&O's Detail    12 May 2021 07:01  -  13 May 2021 07:00  --------------------------------------------------------  IN:    dextrose 5% + sodium chloride 0.9% w/ Additives: 1200 mL  Total IN: 1200 mL    OUT:    Indwelling Catheter - Urethral (mL): 2500 mL  Total OUT: 2500 mL    Total NET: -1300 mL        ciprofloxacin   IVPB      ciprofloxacin   IVPB 400 milliGRAM(s) IV Intermittent every 12 hours      Physical Exam  General: AAOx3, No acute distress  Abdomen: soft, nondistended, nontender, no rebound tenderness, no guarding, no palpable masses  : Normal external genitalia, gaytan catheter in place, UO yellow and clear, no active bleeding   Extremities: non edematous, no calf pain bilaterally        Labs:                        6.9    9.30  )-----------( 193      ( 13 May 2021 06:02 )             21.2     05-12    142  |  112<H>  |  6<L>  ----------------------------<  120<H>  3.6   |  24  |  0.39<L>    Ca    7.7<L>      12 May 2021 07:08        
Interval Events:    Hypotensive overnight, given midodrine  CBC stable this AM  Vaginal packing removed    S: Patient doing well, denies fevers, chills, nausea, emesis, chest pain, SOB.  Pain is well controlled. Tolerating PO w/o N/V.      O: Vital Signs Last 24 Hrs  T(C): 36.7 (12 May 2021 08:26), Max: 36.7 (11 May 2021 16:05)  T(F): 98 (12 May 2021 08:26), Max: 98.1 (12 May 2021 00:18)  HR: 93 (12 May 2021 08:26) (74 - 130)  BP: 91/50 (12 May 2021 08:26) (80/35 - 111/81)  BP(mean): 65 (12 May 2021 06:06) (57 - 79)  RR: 16 (12 May 2021 08:26) (10 - 18)  SpO2: 100% (12 May 2021 08:26) (98% - 100%)      11 May 2021 07:01  -  12 May 2021 07:00  --------------------------------------------------------  IN:    Lactated Ringers Bolus: 1200 mL  Total IN: 1200 mL    OUT:    Estimated Blood Loss (mL): 700 mL    Indwelling Catheter - Urethral (mL): 1050 mL  Total OUT: 1750 mL    Total NET: -550 mL          Physical Exam:    Gen: In no acute distress  Resp: No additional work of breathing   GI: Soft, non-tender, non-distended, with normoactive bowel sounds.  No masses.  MSK: Moves all extremities equally  Skin: No rashes    Labs:                        8.2    12.71 )-----------( 239      ( 12 May 2021 07:08 )             24.6     12 May 2021 07:08    142    |  112    |  6      ----------------------------<  120    3.6     |  24     |  0.39     Ca    7.7        12 May 2021 07:08        CAPILLARY BLOOD GLUCOSE                    MEDICATIONS  (STANDING):  ciprofloxacin   IVPB      ciprofloxacin   IVPB 400 milliGRAM(s) IV Intermittent every 12 hours  dextrose 5% + sodium chloride 0.9% with potassium chloride 20 mEq/L 1000 milliLiter(s) (120 mL/Hr) IV Continuous <Continuous>  heparin   Injectable 5000 Unit(s) SubCutaneous every 8 hours  sodium chloride 0.9% Bolus 500 milliLiter(s) IV Bolus once    MEDICATIONS  (PRN):  acetaminophen  IVPB .. 1000 milliGRAM(s) IV Intermittent once PRN Moderate Pain (4 - 6)

## 2021-05-14 NOTE — DISCHARGE NOTE PROVIDER - NSDCFUADDINST_GEN_ALL_CORE_FT
Call the office if you have fever greater than 101, difficulty urinating, pain not relieved with pain medication, nausea/vomiting.    Your catheter will stay in until next week.    We have sent antibiotics to your pharmacy - please take as prescribed.    Please call Dr. Mckinney's office to schedule followup.

## 2021-05-14 NOTE — DISCHARGE NOTE NURSING/CASE MANAGEMENT/SOCIAL WORK - NSDCVIVACCINE_GEN_ALL_CORE_FT
MMR , 2018/10/9 13:21 , Jane Elizabeth (RN)  Tdap , 2018/10/8 21:34 , Gurpreet Benjamin (KATHERYN)

## 2021-05-14 NOTE — DISCHARGE NOTE PROVIDER - NSDCCPCAREPLAN_GEN_ALL_CORE_FT
PRINCIPAL DISCHARGE DIAGNOSIS  Diagnosis: Urethral diverticulum  Assessment and Plan of Treatment:

## 2021-05-14 NOTE — PROGRESS NOTE ADULT - ASSESSMENT
29 y/o Female s/p urethral diverticulectomy 5/11, near syncopal episode, stable     -STAT cbc   -Bolus IV NS   -Discontinue all narcotics   -IV Tylenol for pain PRN    -Dr Hank paulino   
31 y/o Female s/p urethral diverticulectomy 5/11, acute blood loss anemia     -1U PRBCs, f/u CBC post transfusion   -Pain medication PRN   -Abx   -C/w gaytan catheter   -Reg diet   
29 y/o Female s/p urethral diverticulectomy 5/11, acute blood loss anemia     -d/c planning today  -Pain medication PRN   -Abx   -C/w gaytan catheter   -Reg diet   
30F s/p urethral diverticulectomy.    - Vagoinal packing removed  - Continue antibiotics  - Encourage ambulation  - Repeat CBC @ 11 am  - Possible discharge today if crit stable on recheck and blood pressure okay

## 2021-05-14 NOTE — DISCHARGE NOTE PROVIDER - HOSPITAL COURSE
30yof who presented for urethral diverticulectomy on 5/12.  Intraoperatively, cyst was excised entirely, however patient experienced significant blood loss ~700cc.  Patient tachycardic and felt dizzy, but post-op CBC within expectations for documented blood loss.  POD 1 packign removed and CBC remained stable, however patient felt uncomfortable leaving.  POD 22, there was a drop in HCT from 24.3 to 21.1.  Patient received 1u prbc with excellent response to 28.4.  POD 3 patient felt well and deciion was made to discharge the patient. 30yof who presented for urethral diverticulectomy on 5/12.  Intraoperatively, cyst was excised entirely, however patient experienced significant blood loss ~700cc.  Patient tachycardic and felt dizzy, but post-op CBC within expectations for documented blood loss.  POD 1 packign removed and CBC remained stable, however patient felt uncomfortable leaving.  POD 22, there was a drop in HCT from 24.3 to 21.1.  Patient received 1u prbc with excellent response to 28.4.  POD 3 patient felt well and decision was made to discharge the patient.

## 2021-05-14 NOTE — DISCHARGE NOTE NURSING/CASE MANAGEMENT/SOCIAL WORK - PATIENT PORTAL LINK FT
You can access the FollowMyHealth Patient Portal offered by [facility] by registering at the following website: [email or web address]. By joining [employer]’s FollowMyHealth portal, you will also be able to view your health information using other applications (apps) compatible with our system.

## 2021-05-14 NOTE — DISCHARGE NOTE PROVIDER - NSDCMRMEDTOKEN_GEN_ALL_CORE_FT
Cipro 500 mg oral tablet: 1 tab(s) orally 2 times a day   ciprofloxacin:   metFORMIN 500 mg oral tablet: 1 tab(s) orally once a day  Multiple Vitamins oral capsule: 1 cap(s) orally once a day  spironolactone 25 mg oral tablet: 1 tab(s) orally once a day  zafemy birth control pills daily:

## 2021-05-14 NOTE — DISCHARGE NOTE PROVIDER - CARE PROVIDER_API CALL
Latesha Mckinney (MD; MPH)  Urology  [address] [facility] [address] [place] [place] [address]  Phone: [phone]  Fax: [phone]  Follow Up Time:

## 2021-05-20 ENCOUNTER — APPOINTMENT (OUTPATIENT)
Dept: UROLOGY | Facility: CLINIC | Age: 31
End: 2021-05-20
Payer: MEDICAID

## 2021-05-20 VITALS
HEART RATE: 89 BPM | SYSTOLIC BLOOD PRESSURE: 104 MMHG | RESPIRATION RATE: 15 BRPM | TEMPERATURE: 97.4 F | DIASTOLIC BLOOD PRESSURE: 77 MMHG

## 2021-05-20 PROBLEM — E66.9 OBESITY, UNSPECIFIED: Chronic | Status: ACTIVE | Noted: 2021-04-28

## 2021-05-20 PROCEDURE — A4216: CPT | Mod: NC

## 2021-05-20 PROCEDURE — 99214 OFFICE O/P EST MOD 30 MIN: CPT | Mod: 25

## 2021-05-20 PROCEDURE — 51700 IRRIGATION OF BLADDER: CPT

## 2021-05-23 NOTE — ASSESSMENT
[FreeTextEntry1] : 29 yo F s/p periurethral cyst excision\par \par - Passed Trial of void today\par - Benign pathology\par - FU in 1 month

## 2021-05-23 NOTE — HISTORY OF PRESENT ILLNESS
[FreeTextEntry1] : 31 yo F with history of recurrent UTI for the lat 2 yrs\par Intermittent dysuria, sometimes straining\par Usually voids only about 3 times per day, nocturia 1/night\par 7 courses of abx this year, most recently two weeks ago, feels better after abx\par Went to gyn this past summer - told to go to urologist for possible urethral diverticulum\par not tender, no bleeding\par no gross hematuria\par Mild stress incontinence - no pads or diapers\par Drinks 5 bottles of water daily\par 1 child, \par normal bowel movements\par Of note, presented to ER about 4 weeks ago with right upper quadrant pain, usually after a meal\par CT scan showed normal kidneys\par \par 3/8/21 Interval history: Pt is s/p cholecystectomy \par Since procedure, has been having dysuria and urethral pain\par No gross hematuria\par No fever, chills\par \par 21 Interval history: Pt is s/p periurethral cyst excision\par Here today for trial of void\par Some vaginal discharge since procedure

## 2021-06-11 ENCOUNTER — NON-APPOINTMENT (OUTPATIENT)
Age: 31
End: 2021-06-11

## 2021-06-14 LAB
APPEARANCE: ABNORMAL
BACTERIA UR CULT: NORMAL
BACTERIA: NEGATIVE
BILIRUBIN URINE: NEGATIVE
BLOOD URINE: ABNORMAL
COLOR: ABNORMAL
GLUCOSE QUALITATIVE U: NEGATIVE
HYALINE CASTS: 4 /LPF
KETONES URINE: NEGATIVE
LEUKOCYTE ESTERASE URINE: ABNORMAL
MICROSCOPIC-UA: NORMAL
NITRITE URINE: NEGATIVE
PH URINE: 6
PROTEIN URINE: ABNORMAL
RED BLOOD CELLS URINE: 5 /HPF
SPECIFIC GRAVITY URINE: 1.03
SQUAMOUS EPITHELIAL CELLS: 9 /HPF
UROBILINOGEN URINE: NORMAL
WHITE BLOOD CELLS URINE: 12 /HPF

## 2021-06-21 ENCOUNTER — APPOINTMENT (OUTPATIENT)
Age: 31
End: 2021-06-21
Payer: MEDICAID

## 2021-06-21 DIAGNOSIS — R10.2 PELVIC AND PERINEAL PAIN: ICD-10-CM

## 2021-06-21 PROCEDURE — 99213 OFFICE O/P EST LOW 20 MIN: CPT

## 2021-06-21 NOTE — ASSESSMENT
[FreeTextEntry1] : 31 yo F s/p periurethral cyst excision, recent infection\par \par - Reviewed UA and culture - contaminated\par - Since symptoms have resolved, no further interventions indicated\par - OK to resume all activites with no restrictions\par - FU in 6 months

## 2021-06-21 NOTE — HISTORY OF PRESENT ILLNESS
[FreeTextEntry1] : 31 yo F with history of recurrent UTI for the lat 2 yrs\par Intermittent dysuria, sometimes straining\par Usually voids only about 3 times per day, nocturia 1/night\par 7 courses of abx this year, most recently two weeks ago, feels better after abx\par Went to gyn this past summer - told to go to urologist for possible urethral diverticulum\par not tender, no bleeding\par no gross hematuria\par Mild stress incontinence - no pads or diapers\par Drinks 5 bottles of water daily\par 1 child, \par normal bowel movements\par Of note, presented to ER about 4 weeks ago with right upper quadrant pain, usually after a meal\par CT scan showed normal kidneys\par \par 3/8/21 Interval history: Pt is s/p cholecystectomy \par Since procedure, has been having dysuria and urethral pain\par No gross hematuria\par No fever, chills\par \par 21 Interval history: Pt is s/p periurethral cyst excision\par Here today for trial of void\par Some vaginal discharge since procedure\par \par 21 Interval history: Pt had some brownish vaginal discharge as well as some dysuria about 10 days ago\par Finished course of keflex and symptoms have resolved completely\par Incontinence back to baseline like before surgery with small amoutn of leakage with cough - no pads or diapers\par No pelvic pain or vaginal pain

## 2021-06-21 NOTE — PHYSICAL EXAM
[General Appearance - Well Developed] : well developed [General Appearance - Well Nourished] : well nourished [Normal Appearance] : normal appearance [Well Groomed] : well groomed [General Appearance - In No Acute Distress] : no acute distress [Abdomen Soft] : soft [Abdomen Tenderness] : non-tender [Costovertebral Angle Tenderness] : no ~M costovertebral angle tenderness [Urethral Meatus] : the meatus of the urethra showed no abnormalities [Urinary Bladder Findings] : the bladder was normal on palpation [External Female Genitalia] : normal external genitalia [Vagina] : normal vaginal exam [FreeTextEntry1] : well healed vaginal incision [Edema] : no peripheral edema [] : no respiratory distress [Respiration, Rhythm And Depth] : normal respiratory rhythm and effort [Exaggerated Use Of Accessory Muscles For Inspiration] : no accessory muscle use [Oriented To Time, Place, And Person] : oriented to person, place, and time [Affect] : the affect was normal [Mood] : the mood was normal [Not Anxious] : not anxious [Normal Station and Gait] : the gait and station were normal for the patient's age [No Focal Deficits] : no focal deficits [No Palpable Adenopathy] : no palpable adenopathy

## 2021-07-01 ENCOUNTER — EMERGENCY (EMERGENCY)
Facility: HOSPITAL | Age: 31
LOS: 1 days | Discharge: HOME | End: 2021-07-01
Attending: EMERGENCY MEDICINE
Payer: MEDICAID

## 2021-07-01 VITALS
RESPIRATION RATE: 18 BRPM | DIASTOLIC BLOOD PRESSURE: 79 MMHG | OXYGEN SATURATION: 98 % | WEIGHT: 149.91 LBS | HEIGHT: 58 IN | SYSTOLIC BLOOD PRESSURE: 111 MMHG | TEMPERATURE: 98 F | HEART RATE: 100 BPM

## 2021-07-01 VITALS
SYSTOLIC BLOOD PRESSURE: 118 MMHG | HEART RATE: 77 BPM | TEMPERATURE: 98 F | OXYGEN SATURATION: 100 % | DIASTOLIC BLOOD PRESSURE: 70 MMHG | RESPIRATION RATE: 18 BRPM

## 2021-07-01 DIAGNOSIS — S62.309A UNSPECIFIED FRACTURE OF UNSPECIFIED METACARPAL BONE, INITIAL ENCOUNTER FOR CLOSED FRACTURE: Chronic | ICD-10-CM

## 2021-07-01 DIAGNOSIS — Z87.42 PERSONAL HISTORY OF OTHER DISEASES OF THE FEMALE GENITAL TRACT: Chronic | ICD-10-CM

## 2021-07-01 DIAGNOSIS — Z98.89 OTHER SPECIFIED POSTPROCEDURAL STATES: Chronic | ICD-10-CM

## 2021-07-01 LAB
ANION GAP SERPL CALC-SCNC: 5 MMOL/L — SIGNIFICANT CHANGE UP (ref 5–17)
APPEARANCE UR: CLEAR — SIGNIFICANT CHANGE UP
BASOPHILS # BLD AUTO: 0.05 K/UL — SIGNIFICANT CHANGE UP (ref 0–0.2)
BASOPHILS NFR BLD AUTO: 0.7 % — SIGNIFICANT CHANGE UP (ref 0–2)
BILIRUB UR-MCNC: NEGATIVE — SIGNIFICANT CHANGE UP
BUN SERPL-MCNC: 13 MG/DL — SIGNIFICANT CHANGE UP (ref 7–18)
CALCIUM SERPL-MCNC: 8.7 MG/DL — SIGNIFICANT CHANGE UP (ref 8.4–10.5)
CHLORIDE SERPL-SCNC: 105 MMOL/L — SIGNIFICANT CHANGE UP (ref 96–108)
CO2 SERPL-SCNC: 26 MMOL/L — SIGNIFICANT CHANGE UP (ref 22–31)
COLOR SPEC: YELLOW — SIGNIFICANT CHANGE UP
CREAT SERPL-MCNC: 0.76 MG/DL — SIGNIFICANT CHANGE UP (ref 0.5–1.3)
DIFF PNL FLD: ABNORMAL
EOSINOPHIL # BLD AUTO: 0.23 K/UL — SIGNIFICANT CHANGE UP (ref 0–0.5)
EOSINOPHIL NFR BLD AUTO: 3.4 % — SIGNIFICANT CHANGE UP (ref 0–6)
EPI CELLS # UR: ABNORMAL /HPF
GLUCOSE SERPL-MCNC: 99 MG/DL — SIGNIFICANT CHANGE UP (ref 70–99)
GLUCOSE UR QL: NEGATIVE — SIGNIFICANT CHANGE UP
HCG UR QL: NEGATIVE — SIGNIFICANT CHANGE UP
HCT VFR BLD CALC: 35.5 % — SIGNIFICANT CHANGE UP (ref 34.5–45)
HGB BLD-MCNC: 11.5 G/DL — SIGNIFICANT CHANGE UP (ref 11.5–15.5)
IMM GRANULOCYTES NFR BLD AUTO: 0.3 % — SIGNIFICANT CHANGE UP (ref 0–1.5)
KETONES UR-MCNC: NEGATIVE — SIGNIFICANT CHANGE UP
LEUKOCYTE ESTERASE UR-ACNC: ABNORMAL
LYMPHOCYTES # BLD AUTO: 2.13 K/UL — SIGNIFICANT CHANGE UP (ref 1–3.3)
LYMPHOCYTES # BLD AUTO: 31.7 % — SIGNIFICANT CHANGE UP (ref 13–44)
MCHC RBC-ENTMCNC: 29.4 PG — SIGNIFICANT CHANGE UP (ref 27–34)
MCHC RBC-ENTMCNC: 32.4 GM/DL — SIGNIFICANT CHANGE UP (ref 32–36)
MCV RBC AUTO: 90.8 FL — SIGNIFICANT CHANGE UP (ref 80–100)
MONOCYTES # BLD AUTO: 0.56 K/UL — SIGNIFICANT CHANGE UP (ref 0–0.9)
MONOCYTES NFR BLD AUTO: 8.3 % — SIGNIFICANT CHANGE UP (ref 2–14)
NEUTROPHILS # BLD AUTO: 3.73 K/UL — SIGNIFICANT CHANGE UP (ref 1.8–7.4)
NEUTROPHILS NFR BLD AUTO: 55.6 % — SIGNIFICANT CHANGE UP (ref 43–77)
NITRITE UR-MCNC: NEGATIVE — SIGNIFICANT CHANGE UP
NRBC # BLD: 0 /100 WBCS — SIGNIFICANT CHANGE UP (ref 0–0)
PH UR: 6 — SIGNIFICANT CHANGE UP (ref 5–8)
PLATELET # BLD AUTO: 370 K/UL — SIGNIFICANT CHANGE UP (ref 150–400)
POTASSIUM SERPL-MCNC: 4 MMOL/L — SIGNIFICANT CHANGE UP (ref 3.5–5.3)
POTASSIUM SERPL-SCNC: 4 MMOL/L — SIGNIFICANT CHANGE UP (ref 3.5–5.3)
PROT UR-MCNC: 15
RBC # BLD: 3.91 M/UL — SIGNIFICANT CHANGE UP (ref 3.8–5.2)
RBC # FLD: 12.5 % — SIGNIFICANT CHANGE UP (ref 10.3–14.5)
RBC CASTS # UR COMP ASSIST: SIGNIFICANT CHANGE UP /HPF (ref 0–2)
SODIUM SERPL-SCNC: 136 MMOL/L — SIGNIFICANT CHANGE UP (ref 135–145)
SP GR SPEC: 1.01 — SIGNIFICANT CHANGE UP (ref 1.01–1.02)
UROBILINOGEN FLD QL: NEGATIVE — SIGNIFICANT CHANGE UP
WBC # BLD: 6.72 K/UL — SIGNIFICANT CHANGE UP (ref 3.8–10.5)
WBC # FLD AUTO: 6.72 K/UL — SIGNIFICANT CHANGE UP (ref 3.8–10.5)
WBC UR QL: SIGNIFICANT CHANGE UP /HPF (ref 0–5)

## 2021-07-01 PROCEDURE — 81025 URINE PREGNANCY TEST: CPT

## 2021-07-01 PROCEDURE — 85025 COMPLETE CBC W/AUTO DIFF WBC: CPT

## 2021-07-01 PROCEDURE — 80048 BASIC METABOLIC PNL TOTAL CA: CPT

## 2021-07-01 PROCEDURE — 99284 EMERGENCY DEPT VISIT MOD MDM: CPT

## 2021-07-01 PROCEDURE — 81001 URINALYSIS AUTO W/SCOPE: CPT

## 2021-07-01 PROCEDURE — 87591 N.GONORRHOEAE DNA AMP PROB: CPT

## 2021-07-01 PROCEDURE — 36415 COLL VENOUS BLD VENIPUNCTURE: CPT

## 2021-07-01 PROCEDURE — 87086 URINE CULTURE/COLONY COUNT: CPT

## 2021-07-01 PROCEDURE — 87491 CHLMYD TRACH DNA AMP PROBE: CPT

## 2021-07-01 RX ORDER — CEPHALEXIN 500 MG
1 CAPSULE ORAL
Qty: 28 | Refills: 0
Start: 2021-07-01 | End: 2021-07-14

## 2021-07-01 NOTE — ED PROVIDER NOTE - CHILD ABUSE FACILITY
Spoke to patient and [facility] U/s for 12:30pm and visit with PENELOPE Humphries at 1:00pm. Patient verbalized understanding.    H

## 2021-07-01 NOTE — ED ADULT NURSE NOTE - NSIMPLEMENTINTERV_GEN_ALL_ED
Implemented All Universal Safety Interventions:  [place] to call system. Call bell, personal items and telephone within reach. Instruct patient to call for assistance. Room bathroom lighting operational. Non-slip footwear when patient is off stretcher. Physically safe environment: no spills, clutter or unnecessary equipment. Stretcher in lowest position, wheels locked, appropriate side rails in place.

## 2021-07-01 NOTE — ED PROVIDER NOTE - CLINICAL SUMMARY MEDICAL DECISION MAKING FREE TEXT BOX
30 y.o female presents with dysuria for 1 day. No systemic symptoms. Vital signs are normal, afebrile. Will do labs, urine, urology consult, and reassess.

## 2021-07-01 NOTE — ED PROVIDER NOTE - PATIENT PORTAL LINK FT
You can access the FollowMyHealth Patient Portal offered by [facility] by registering at the following website: [email or web address]. By joining [employer]’s FollowMyHealth portal, you will also be able to view your health information using other applications (apps) compatible with our system.

## 2021-07-01 NOTE — CONSULT NOTE ADULT - ASSESSMENT
30F s/p periurethral cyst excision 5/11 presenting c/o dysuria and vaginal discharge  Afeb  UA with trace of leukocytes    -Recommend PO antibiotics; 2 weeks of keflex  -Hydration  -Urine culture  -F/u with Dr. Mckinney in 2 weeks, earlier if symptoms worsen  -D/w Dr. Mckinney

## 2021-07-01 NOTE — CONSULT NOTE ADULT - SUBJECTIVE AND OBJECTIVE BOX
UROLOGY:  30F with PSHx of periuretheral cyst excision  presenting with dysuria and vaginal discharge x 1 day. Patient was recently given a prescription for Cipro for vaginal discharge with momentary improvement of symptoms. Pt states the symptoms returned yesterday, however this time she states it is very painful to urinate. She states the vaginal discharge is darker in color than usual. Denies fever, chills, nausea/vomiting, abdominal pain or any other complaints.     PAST MEDICAL & SURGICAL HISTORY:  Endometriosis    Asthma    History of PCOS    Umbilical hernia    Ovarian cyst    Appendicitis    Obesity    S/P Appendectomy    H/O hernia repair  umbilical hernia repair 2011 X 3    History of ovarian cyst  rupture- . pt states she had internal bleeding and needed a transfusion.    Fracture, metacarpal  s/p repair with metal plate        MEDICATIONS  (STANDING):    MEDICATIONS  (PRN):      Allergies    fruits (apples, pineapples, pears, cherries,  kiwi)---mouth edema and &quot;itchy throat&quot; (Other)  Nuts (Anaphylaxis)  nuts--mouth edema and &quot;itchy throat&quot; (Other)  Reglan (Hives)  Reglan (Rash)  reglan--pruritic rash (Other)    Intolerances    Vital Signs Last 24 Hrs  T(C): 36.7 (2021 13:17), Max: 36.7 (2021 13:17)  T(F): 98 (2021 13:17), Max: 98 (2021 13:17)  HR: 100 (2021 13:17) (100 - 100)  BP: 111/79 (2021 13:17) (111/79 - 111/79)  RR: 18 (2021 13:17) (18 - 18)  SpO2: 98% (2021 13:17) (98% - 98%)    Physical:  Gen: A&Ox3. NAD  Chest: respiration unlabored  : No visible urethral or vaginal abnormalities noted. No vaginal discharge. No bleeding noted.        I&O's Detail      LABS:                        11.5   6.72  )-----------( 370      ( 2021 14:10 )             35.5              07-01    136  |  105  |  13  ----------------------------<  99  4.0   |  26  |  0.76    Ca    8.7      2021 14:10                Urinalysis Basic - ( 2021 14:10 )    Color: Yellow / Appearance: Clear / S.015 / pH: x  Gluc: x / Ketone: Negative  / Bili: Negative / Urobili: Negative   Blood: x / Protein: 15 / Nitrite: Negative   Leuk Esterase: Trace / RBC: 0-2 /HPF / WBC 3-5 /HPF   Sq Epi: x / Non Sq Epi: Occasional /HPF / Bacteria: x

## 2021-07-01 NOTE — ED PROVIDER NOTE - ABDOMINAL EXAM
mild suprapubic tenderness/soft/nontender.../nondistended mild suprapubic tenderness. No CVAT./soft/nontender.../nondistended

## 2021-07-01 NOTE — ED PROVIDER NOTE - OBJECTIVE STATEMENT
30 y.o female with a history of ruptured ovarian cyst, appendectomy, cholecystectomy, hernia repair, and periurethral cyst removal on 05/11/2021 (by Dr. Mckinney) presents to the ED complaining of painful urination, urinary frequency, and clear vaginal discharge since yesterday. Patient was recently given a prescription for Cipro, over the phone, for similar symptoms and this medication helped. Patient has mild nausea. Patient denies fever, chills, flank pain, vomiting, abdominal pain, or any other complaints. Allergy to nuts, Reglan, and some fruits. 30 y.o female with a history of ruptured ovarian cyst, appendectomy, cholecystectomy, hernia repair, and periurethral cyst removal on 05/11/2021 (by Dr. Mckinney) presents to the ED complaining of painful urination, urinary frequency, and clear yellow vaginal discharge since yesterday. Patient was recently given a prescription for Cipro, over the phone, for similar symptoms and this medication helped. Patient has mild nausea. Patient denies fever, chills, flank pain, vomiting, abdominal pain, or any other complaints. Allergy to nuts, Reglan, and some fruits.

## 2021-07-01 NOTE — ED PROVIDER NOTE - CARE PROVIDER_API CALL
Latesha Mckinney (MD; MPH)  Urology  [address] [facility] [address] [place] [address]  Phone: [phone]  Fax: [phone]  Follow Up Time:

## 2021-07-01 NOTE — ED PROVIDER NOTE - PROGRESS NOTE DETAILS
No leukocytosis. UA shows trace leuks/blood. Consulted urology given recent surgery by Dr Mckinney. Recommendation by urology team ti give Rx for Keflex 500 mg orally BID x 14 days and follow up with Dr Mckinney in 2 weeks. Urine culture sent. Discussed red flags to come back to the ED. Pt is well appearing walking with steady gait, stable for discharge and follow up without fail with medical doctor. I had a detailed discussion with the patient and/or guardian regarding the historical points, exam findings, and any diagnostic results supporting the discharge diagnosis. Pt educated on care and need for follow up. Strict return instructions and red flag signs and symptoms discussed with patient. Questions answered. Pt shows understanding of discharge information and agrees to follow.

## 2021-07-01 NOTE — ED PROVIDER NOTE - NSFOLLOWUPINSTRUCTIONS_ED_ALL_ED_FT
Follow up with Dr Mckinney in 2 weeks.    If you experience any new or worsening symptoms or if you are concerned you can always come back to the emergency for a re-evaluation.    If there were any prescriptions given to you during the visit today take them as prescribed. If you have any questions you can ask the pharmacist.

## 2021-07-02 LAB
C TRACH RRNA SPEC QL NAA+PROBE: SIGNIFICANT CHANGE UP
N GONORRHOEA RRNA SPEC QL NAA+PROBE: SIGNIFICANT CHANGE UP
SPECIMEN SOURCE: SIGNIFICANT CHANGE UP

## 2021-07-03 LAB
CULTURE RESULTS: SIGNIFICANT CHANGE UP
SPECIMEN SOURCE: SIGNIFICANT CHANGE UP

## 2021-07-12 ENCOUNTER — APPOINTMENT (OUTPATIENT)
Dept: UROLOGY | Facility: CLINIC | Age: 31
End: 2021-07-12
Payer: MEDICAID

## 2021-07-12 PROCEDURE — 99213 OFFICE O/P EST LOW 20 MIN: CPT

## 2021-07-18 NOTE — ASSESSMENT
[FreeTextEntry1] : 29 yo F s/p periurethral cyst excision. Persistent UTI\par \par - Reviewed records from recent ER visit - normal UA and neg urine culture\par - Discussed pros and cons of pelvic MRI. Will obtain pre-auth\par - FU after MRI

## 2021-07-18 NOTE — PHYSICAL EXAM
[General Appearance - Well Developed] : well developed [General Appearance - Well Nourished] : well nourished [Normal Appearance] : normal appearance [Well Groomed] : well groomed [General Appearance - In No Acute Distress] : no acute distress [Abdomen Soft] : soft [Abdomen Tenderness] : non-tender [Costovertebral Angle Tenderness] : no ~M costovertebral angle tenderness [Urethral Meatus] : normal urethra [Urinary Bladder Findings] : the bladder was normal on palpation [External Female Genitalia] : normal external genitalia [Vagina] : normal vaginal exam [FreeTextEntry1] : well healed vaginal incision [Edema] : no peripheral edema [] : no respiratory distress [Respiration, Rhythm And Depth] : normal respiratory rhythm and effort [Exaggerated Use Of Accessory Muscles For Inspiration] : no accessory muscle use [Oriented To Time, Place, And Person] : oriented to person, place, and time [Affect] : the affect was normal [Mood] : the mood was normal [Not Anxious] : not anxious [Normal Station and Gait] : the gait and station were normal for the patient's age [No Focal Deficits] : no focal deficits [No Palpable Adenopathy] : no palpable adenopathy

## 2021-07-18 NOTE — HISTORY OF PRESENT ILLNESS
[FreeTextEntry1] : 31 yo F with history of recurrent UTI for the lat 2 yrs\par Intermittent dysuria, sometimes straining\par Usually voids only about 3 times per day, nocturia 1/night\par 7 courses of abx this year, most recently two weeks ago, feels better after abx\par Went to gyn this past summer - told to go to urologist for possible urethral diverticulum\par not tender, no bleeding\par no gross hematuria\par Mild stress incontinence - no pads or diapers\par Drinks 5 bottles of water daily\par 1 child, \par normal bowel movements\par Of note, presented to ER about 4 weeks ago with right upper quadrant pain, usually after a meal\par CT scan showed normal kidneys\par \par 3/8/21 Interval history: Pt is s/p cholecystectomy \par Since procedure, has been having dysuria and urethral pain\par No gross hematuria\par No fever, chills\par \par 21 Interval history: Pt is s/p periurethral cyst excision\par Here today for trial of void\par Some vaginal discharge since procedure\par \par 21 Interval history: Pt had some brownish vaginal discharge as well as some dysuria about 10 days ago\par Finished course of keflex and symptoms have resolved completely\par Incontinence back to baseline like before surgery with small amoutn of leakage with cough - no pads or diapers\par No pelvic pain or vaginal pain\par \par 21 Interval history: Symptoms resolved with course of abx but went to ER last week for dysuria\par Some clear mucus vaginal discharge\par Incontinence is actually better than before surgery\par no gross hematuria\par no fever\par

## 2021-08-15 ENCOUNTER — TRANSCRIPTION ENCOUNTER (OUTPATIENT)
Age: 31
End: 2021-08-15

## 2021-11-15 ENCOUNTER — APPOINTMENT (OUTPATIENT)
Dept: UROLOGY | Facility: CLINIC | Age: 31
End: 2021-11-15

## 2021-11-30 LAB — BACTERIA UR CULT: NORMAL

## 2021-12-06 ENCOUNTER — APPOINTMENT (OUTPATIENT)
Age: 31
End: 2021-12-06
Payer: MEDICAID

## 2021-12-06 PROCEDURE — 99213 OFFICE O/P EST LOW 20 MIN: CPT

## 2021-12-12 NOTE — ASSESSMENT
[FreeTextEntry1] : 31 yo F s/p periurethral cyst excision. Persistent UTI\par \par - Reviewed imaging through [facility] portal and confirmed findings\par - Discussed risks and benefits of options including continuing observation vs repeat cyst excision. Pt interested in repeat procedure\par - FU with Dr. Nitin Lopez regarding repeat procedure

## 2021-12-12 NOTE — HISTORY OF PRESENT ILLNESS
[FreeTextEntry1] : 29 yo F with history of recurrent UTI for the lat 2 yrs\par Intermittent dysuria, sometimes straining\par Usually voids only about 3 times per day, nocturia 1/night\par 7 courses of abx this year, most recently two weeks ago, feels better after abx\par Went to gyn this past summer - told to go to urologist for possible urethral diverticulum\par not tender, no bleeding\par no gross hematuria\par Mild stress incontinence - no pads or diapers\par Drinks 5 bottles of water daily\par 1 child, \par normal bowel movements\par Of note, presented to ER about 4 weeks ago with right upper quadrant pain, usually after a meal\par CT scan showed normal kidneys\par \par 3/8/21 Interval history: Pt is s/p cholecystectomy \par Since procedure, has been having dysuria and urethral pain\par No gross hematuria\par No fever, chills\par \par 21 Interval history: Pt is s/p periurethral cyst excision\par Here today for trial of void\par Some vaginal discharge since procedure\par \par 21 Interval history: Pt had some brownish vaginal discharge as well as some dysuria about 10 days ago\par Finished course of keflex and symptoms have resolved completely\par Incontinence back to baseline like before surgery with small amoutn of leakage with cough - no pads or diapers\par No pelvic pain or vaginal pain\par \par 21 Interval history: Symptoms resolved with course of abx but went to ER last week for dysuria\par Some clear mucus vaginal discharge\par Incontinence is actually better than before surgery\par no gross hematuria\par no fever\par \par 21 Interval history: Since last visit had two episodes of dysuria, sometimes difficulty urinating\par Cultures have been normal each time\par Of note, repeat MRI in July through  showed residual cyst

## 2022-01-24 ENCOUNTER — APPOINTMENT (OUTPATIENT)
Dept: UROLOGY | Facility: CLINIC | Age: 32
End: 2022-01-24
Payer: MEDICAID

## 2022-01-24 ENCOUNTER — LABORATORY RESULT (OUTPATIENT)
Age: 32
End: 2022-01-24

## 2022-01-24 VITALS
HEIGHT: 59 IN | WEIGHT: 152 LBS | TEMPERATURE: 97.7 F | DIASTOLIC BLOOD PRESSURE: 82 MMHG | HEART RATE: 81 BPM | BODY MASS INDEX: 30.64 KG/M2 | RESPIRATION RATE: 17 BRPM | SYSTOLIC BLOOD PRESSURE: 119 MMHG

## 2022-01-24 DIAGNOSIS — N36.8 OTHER SPECIFIED DISORDERS OF URETHRA: ICD-10-CM

## 2022-01-24 PROCEDURE — 99215 OFFICE O/P EST HI 40 MIN: CPT

## 2022-01-24 PROCEDURE — 51798 US URINE CAPACITY MEASURE: CPT

## 2022-01-24 NOTE — PHYSICAL EXAM
[General Appearance - Well Developed] : well developed [General Appearance - Well Nourished] : well nourished [Normal Appearance] : normal appearance [Well Groomed] : well groomed [General Appearance - In No Acute Distress] : no acute distress [Abdomen Soft] : soft [Abdomen Tenderness] : non-tender [Urethral Meatus] : normal urethra [Urinary Bladder Findings] : the bladder was normal on palpation [FreeTextEntry1] : some tenderness underlying urethra. faintly palpable area of possible fluctuance  [Edema] : no peripheral edema [] : no respiratory distress [Respiration, Rhythm And Depth] : normal respiratory rhythm and effort [Exaggerated Use Of Accessory Muscles For Inspiration] : no accessory muscle use [Oriented To Time, Place, And Person] : oriented to person, place, and time [Affect] : the affect was normal [Mood] : the mood was normal [Not Anxious] : not anxious [Normal Station and Gait] : the gait and station were normal for the patient's age [No Focal Deficits] : no focal deficits [No Palpable Adenopathy] : no palpable adenopathy

## 2022-01-24 NOTE — HISTORY OF PRESENT ILLNESS
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  She reported complete resolution of symptoms for approximately a month and then reported vaginal pain and dysuria. She had several uti's since then. She reports her symptoms are mostly better after antibiotics but due to the frequency of UTIs she is experiencing bothersome pain. \par \par Dysuria and vaginal pain.  At times radiates to LLQ.   Some improvement with cranberry supplements and NSAIDS.  Not taking NSAIDS every day.   She does at time feel like she has to push to urinate.   \par \par No dyspareunia. \par \par MRI performed outside after surgery (2021) demonstrates residual cyst.  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

## 2022-01-24 NOTE — ASSESSMENT
[FreeTextEntry1] : Counseled the patient on constipation and how it can affect the urinary tract. We discussed increasing water intake, daily fruits and vegetables, and sources of fiber.  We recommended 4 servings of whole fruit per day, excluding dried fruit or juices.  We also recommended supplementing soluble fiber intake with gummy fiber #2/day.\par \par I counseled the patient on recurrent urinary tract infections and the treatment options.\par \par We discussed conservative measures that can contribute to recurrent UTIs, including avoidance of constipation and increasing water daily water intake by 1.5 L in accordance to studies to show that this can decrease frequency and duration of UTIs.\par \par We discussed prophylactic strategies including cranberry supplements, with the active ingredient Proanthocyanidins (PACS) distinguishing over-the-counter vs prescription-grade products.\par \par We discussed the role of oral probiotics in patients who have received multiple courses of antibiotics. That the microbiome may be altered and that probiotics administered orally can theoretically benefit. We also discussed the utility of vaginally delivered probiotics and provided the patient information for bioPHresh vaginal probiotics at [email or web address]\par \par \par PLAN\par RTO cystoscopy\par ellura\par biophresh\par increase water intake\par gummy fiber / fruit\par \par patient to obtain MRI disc\par we will also try to push MRI imaging - unable to see MRI today since it was >6 mo. ago and unable to pull up on [facility] portal\par \par Excision of Vaginal Periurethral Cyst, possible urethral reconstruction, Cystoscopy.\par We discussed the r/b/a of an Excision of Vaginal Periurethral Cyst, possible urethral reconstruction, Cystoscopy.\par We disuccsed the potential risks of bleeding, infection, and pain. Recurrence of cyst. Possible need for urinary catheter if the cyst involves the urethra. Small but possible risks that there are malignant components to the cyst. We also discussed the possibility of scar formation and urinary incontinence as rare risks.\par \par We will decide on surgery after cystoscopy and attempting to prevent UTIs

## 2022-01-25 LAB
APPEARANCE: CLEAR
BILIRUBIN URINE: NEGATIVE
BLOOD URINE: ABNORMAL
COLOR: YELLOW
GLUCOSE QUALITATIVE U: NEGATIVE
KETONES URINE: NEGATIVE
LEUKOCYTE ESTERASE URINE: ABNORMAL
NITRITE URINE: NEGATIVE
PH URINE: 6
PROTEIN URINE: NORMAL
SPECIFIC GRAVITY URINE: 1.03
UROBILINOGEN URINE: NORMAL

## 2022-03-10 ENCOUNTER — LABORATORY RESULT (OUTPATIENT)
Age: 32
End: 2022-03-10

## 2022-03-10 ENCOUNTER — APPOINTMENT (OUTPATIENT)
Dept: UROLOGY | Facility: CLINIC | Age: 32
End: 2022-03-10
Payer: MEDICAID

## 2022-03-10 ENCOUNTER — OUTPATIENT (OUTPATIENT)
Dept: OUTPATIENT SERVICES | Facility: HOSPITAL | Age: 32
LOS: 1 days | End: 2022-03-10
Payer: MEDICAID

## 2022-03-10 VITALS
RESPIRATION RATE: 17 BRPM | BODY MASS INDEX: 30.64 KG/M2 | HEIGHT: 59 IN | WEIGHT: 152 LBS | HEART RATE: 78 BPM | OXYGEN SATURATION: 99 % | SYSTOLIC BLOOD PRESSURE: 119 MMHG | DIASTOLIC BLOOD PRESSURE: 80 MMHG | TEMPERATURE: 98.1 F

## 2022-03-10 DIAGNOSIS — R35.0 FREQUENCY OF MICTURITION: ICD-10-CM

## 2022-03-10 DIAGNOSIS — N39.0 URINARY TRACT INFECTION, SITE NOT SPECIFIED: ICD-10-CM

## 2022-03-10 DIAGNOSIS — N36.1 URETHRAL DIVERTICULUM: ICD-10-CM

## 2022-03-10 DIAGNOSIS — Z98.89 OTHER SPECIFIED POSTPROCEDURAL STATES: Chronic | ICD-10-CM

## 2022-03-10 DIAGNOSIS — Z87.42 PERSONAL HISTORY OF OTHER DISEASES OF THE FEMALE GENITAL TRACT: Chronic | ICD-10-CM

## 2022-03-10 DIAGNOSIS — S62.309A UNSPECIFIED FRACTURE OF UNSPECIFIED METACARPAL BONE, INITIAL ENCOUNTER FOR CLOSED FRACTURE: Chronic | ICD-10-CM

## 2022-03-10 PROCEDURE — 52000 CYSTOURETHROSCOPY: CPT

## 2022-03-11 DIAGNOSIS — N39.0 URINARY TRACT INFECTION, SITE NOT SPECIFIED: ICD-10-CM

## 2022-03-11 DIAGNOSIS — N36.1 URETHRAL DIVERTICULUM: ICD-10-CM

## 2022-03-11 LAB
APPEARANCE: ABNORMAL
BILIRUBIN URINE: NEGATIVE
BLOOD URINE: ABNORMAL
COLOR: YELLOW
GLUCOSE QUALITATIVE U: NEGATIVE
KETONES URINE: NEGATIVE
LEUKOCYTE ESTERASE URINE: ABNORMAL
NITRITE URINE: NEGATIVE
PH URINE: 7
PROTEIN URINE: ABNORMAL
SPECIFIC GRAVITY URINE: 1.03
UROBILINOGEN URINE: NORMAL

## 2022-05-05 ENCOUNTER — EMERGENCY (EMERGENCY)
Facility: HOSPITAL | Age: 32
LOS: 1 days | Discharge: HOME | End: 2022-05-05
Attending: EMERGENCY MEDICINE
Payer: MEDICAID

## 2022-05-05 VITALS
SYSTOLIC BLOOD PRESSURE: 101 MMHG | DIASTOLIC BLOOD PRESSURE: 61 MMHG | TEMPERATURE: 98 F | OXYGEN SATURATION: 99 % | HEART RATE: 72 BPM | RESPIRATION RATE: 18 BRPM

## 2022-05-05 VITALS
HEART RATE: 81 BPM | TEMPERATURE: 98 F | OXYGEN SATURATION: 98 % | HEIGHT: 58 IN | RESPIRATION RATE: 16 BRPM | SYSTOLIC BLOOD PRESSURE: 127 MMHG | DIASTOLIC BLOOD PRESSURE: 77 MMHG | WEIGHT: 149.91 LBS

## 2022-05-05 DIAGNOSIS — Z87.42 PERSONAL HISTORY OF OTHER DISEASES OF THE FEMALE GENITAL TRACT: Chronic | ICD-10-CM

## 2022-05-05 DIAGNOSIS — S62.309A UNSPECIFIED FRACTURE OF UNSPECIFIED METACARPAL BONE, INITIAL ENCOUNTER FOR CLOSED FRACTURE: Chronic | ICD-10-CM

## 2022-05-05 DIAGNOSIS — Z98.89 OTHER SPECIFIED POSTPROCEDURAL STATES: Chronic | ICD-10-CM

## 2022-05-05 PROCEDURE — 99284 EMERGENCY DEPT VISIT MOD MDM: CPT

## 2022-05-05 RX ORDER — LIDOCAINE 4 G/100G
10 CREAM TOPICAL ONCE
Refills: 0 | Status: COMPLETED | OUTPATIENT
Start: 2022-05-05 | End: 2022-05-05

## 2022-05-05 RX ORDER — FAMOTIDINE 10 MG/ML
20 INJECTION INTRAVENOUS ONCE
Refills: 0 | Status: COMPLETED | OUTPATIENT
Start: 2022-05-05 | End: 2022-05-05

## 2022-05-05 RX ORDER — ONDANSETRON 8 MG/1
4 TABLET, FILM COATED ORAL ONCE
Refills: 0 | Status: COMPLETED | OUTPATIENT
Start: 2022-05-05 | End: 2022-05-05

## 2022-05-05 RX ORDER — ACETAMINOPHEN 500 MG
650 TABLET ORAL ONCE
Refills: 0 | Status: DISCONTINUED | OUTPATIENT
Start: 2022-05-05 | End: 2022-05-09

## 2022-05-05 NOTE — ED ADULT TRIAGE NOTE - CHIEF COMPLAINT QUOTE
c/o on and off abdominal pain x 1 week now, 7 weeks pregnant, denies vaginal bleeding also frequency in urination

## 2022-05-06 ENCOUNTER — EMERGENCY (EMERGENCY)
Facility: HOSPITAL | Age: 32
LOS: 1 days | Discharge: HOME | End: 2022-05-06
Attending: EMERGENCY MEDICINE
Payer: MEDICAID

## 2022-05-06 VITALS
TEMPERATURE: 98 F | SYSTOLIC BLOOD PRESSURE: 97 MMHG | HEART RATE: 70 BPM | DIASTOLIC BLOOD PRESSURE: 65 MMHG | OXYGEN SATURATION: 99 % | RESPIRATION RATE: 16 BRPM

## 2022-05-06 VITALS
TEMPERATURE: 98 F | RESPIRATION RATE: 16 BRPM | WEIGHT: 167.99 LBS | OXYGEN SATURATION: 100 % | HEART RATE: 74 BPM | HEIGHT: 58 IN | DIASTOLIC BLOOD PRESSURE: 66 MMHG | SYSTOLIC BLOOD PRESSURE: 96 MMHG

## 2022-05-06 DIAGNOSIS — Z87.42 PERSONAL HISTORY OF OTHER DISEASES OF THE FEMALE GENITAL TRACT: Chronic | ICD-10-CM

## 2022-05-06 DIAGNOSIS — Z98.89 OTHER SPECIFIED POSTPROCEDURAL STATES: Chronic | ICD-10-CM

## 2022-05-06 DIAGNOSIS — S62.309A UNSPECIFIED FRACTURE OF UNSPECIFIED METACARPAL BONE, INITIAL ENCOUNTER FOR CLOSED FRACTURE: Chronic | ICD-10-CM

## 2022-05-06 LAB
ALBUMIN SERPL ELPH-MCNC: 3.2 G/DL — LOW (ref 3.5–5)
ALP SERPL-CCNC: 52 U/L — SIGNIFICANT CHANGE UP (ref 40–120)
ALT FLD-CCNC: 18 U/L DA — SIGNIFICANT CHANGE UP (ref 10–60)
ANION GAP SERPL CALC-SCNC: 7 MMOL/L — SIGNIFICANT CHANGE UP (ref 5–17)
APPEARANCE UR: CLEAR — SIGNIFICANT CHANGE UP
AST SERPL-CCNC: 5 U/L — LOW (ref 10–40)
BASOPHILS # BLD AUTO: 0.05 K/UL — SIGNIFICANT CHANGE UP (ref 0–0.2)
BASOPHILS NFR BLD AUTO: 0.5 % — SIGNIFICANT CHANGE UP (ref 0–2)
BILIRUB SERPL-MCNC: 0.2 MG/DL — SIGNIFICANT CHANGE UP (ref 0.2–1.2)
BILIRUB UR-MCNC: NEGATIVE — SIGNIFICANT CHANGE UP
BUN SERPL-MCNC: 12 MG/DL — SIGNIFICANT CHANGE UP (ref 7–18)
CALCIUM SERPL-MCNC: 8.8 MG/DL — SIGNIFICANT CHANGE UP (ref 8.4–10.5)
CHLORIDE SERPL-SCNC: 105 MMOL/L — SIGNIFICANT CHANGE UP (ref 96–108)
CO2 SERPL-SCNC: 25 MMOL/L — SIGNIFICANT CHANGE UP (ref 22–31)
COLOR SPEC: YELLOW — SIGNIFICANT CHANGE UP
CREAT SERPL-MCNC: 0.63 MG/DL — SIGNIFICANT CHANGE UP (ref 0.5–1.3)
DIFF PNL FLD: ABNORMAL
EGFR: 122 ML/MIN/1.73M2 — SIGNIFICANT CHANGE UP
EOSINOPHIL # BLD AUTO: 0.17 K/UL — SIGNIFICANT CHANGE UP (ref 0–0.5)
EOSINOPHIL NFR BLD AUTO: 1.5 % — SIGNIFICANT CHANGE UP (ref 0–6)
GLUCOSE SERPL-MCNC: 92 MG/DL — SIGNIFICANT CHANGE UP (ref 70–99)
GLUCOSE UR QL: NEGATIVE — SIGNIFICANT CHANGE UP
HCG SERPL-ACNC: HIGH MIU/ML
HCT VFR BLD CALC: 36.7 % — SIGNIFICANT CHANGE UP (ref 34.5–45)
HGB BLD-MCNC: 12.4 G/DL — SIGNIFICANT CHANGE UP (ref 11.5–15.5)
IMM GRANULOCYTES NFR BLD AUTO: 0.5 % — SIGNIFICANT CHANGE UP (ref 0–1.5)
KETONES UR-MCNC: NEGATIVE — SIGNIFICANT CHANGE UP
LEUKOCYTE ESTERASE UR-ACNC: NEGATIVE — SIGNIFICANT CHANGE UP
LIDOCAIN IGE QN: 157 U/L — SIGNIFICANT CHANGE UP (ref 73–393)
LYMPHOCYTES # BLD AUTO: 2.45 K/UL — SIGNIFICANT CHANGE UP (ref 1–3.3)
LYMPHOCYTES # BLD AUTO: 22.3 % — SIGNIFICANT CHANGE UP (ref 13–44)
MCHC RBC-ENTMCNC: 32.8 PG — SIGNIFICANT CHANGE UP (ref 27–34)
MCHC RBC-ENTMCNC: 33.8 GM/DL — SIGNIFICANT CHANGE UP (ref 32–36)
MCV RBC AUTO: 97.1 FL — SIGNIFICANT CHANGE UP (ref 80–100)
MONOCYTES # BLD AUTO: 0.93 K/UL — HIGH (ref 0–0.9)
MONOCYTES NFR BLD AUTO: 8.5 % — SIGNIFICANT CHANGE UP (ref 2–14)
NEUTROPHILS # BLD AUTO: 7.34 K/UL — SIGNIFICANT CHANGE UP (ref 1.8–7.4)
NEUTROPHILS NFR BLD AUTO: 66.7 % — SIGNIFICANT CHANGE UP (ref 43–77)
NITRITE UR-MCNC: NEGATIVE — SIGNIFICANT CHANGE UP
NRBC # BLD: 0 /100 WBCS — SIGNIFICANT CHANGE UP (ref 0–0)
PH UR: 6 — SIGNIFICANT CHANGE UP (ref 5–8)
PLATELET # BLD AUTO: 278 K/UL — SIGNIFICANT CHANGE UP (ref 150–400)
POTASSIUM SERPL-MCNC: 3.8 MMOL/L — SIGNIFICANT CHANGE UP (ref 3.5–5.3)
POTASSIUM SERPL-SCNC: 3.8 MMOL/L — SIGNIFICANT CHANGE UP (ref 3.5–5.3)
PROT SERPL-MCNC: 7.3 G/DL — SIGNIFICANT CHANGE UP (ref 6–8.3)
PROT UR-MCNC: NEGATIVE — SIGNIFICANT CHANGE UP
RBC # BLD: 3.78 M/UL — LOW (ref 3.8–5.2)
RBC # FLD: 12.1 % — SIGNIFICANT CHANGE UP (ref 10.3–14.5)
SODIUM SERPL-SCNC: 137 MMOL/L — SIGNIFICANT CHANGE UP (ref 135–145)
SP GR SPEC: 1.01 — SIGNIFICANT CHANGE UP (ref 1.01–1.02)
UROBILINOGEN FLD QL: NEGATIVE — SIGNIFICANT CHANGE UP
WBC # BLD: 10.99 K/UL — HIGH (ref 3.8–10.5)
WBC # FLD AUTO: 10.99 K/UL — HIGH (ref 3.8–10.5)

## 2022-05-06 PROCEDURE — 76705 ECHO EXAM OF ABDOMEN: CPT | Mod: 26

## 2022-05-06 PROCEDURE — 85025 COMPLETE CBC W/AUTO DIFF WBC: CPT

## 2022-05-06 PROCEDURE — 76705 ECHO EXAM OF ABDOMEN: CPT

## 2022-05-06 PROCEDURE — 36415 COLL VENOUS BLD VENIPUNCTURE: CPT

## 2022-05-06 PROCEDURE — 80053 COMPREHEN METABOLIC PANEL: CPT

## 2022-05-06 PROCEDURE — 96374 THER/PROPH/DIAG INJ IV PUSH: CPT

## 2022-05-06 PROCEDURE — 83690 ASSAY OF LIPASE: CPT

## 2022-05-06 PROCEDURE — 81001 URINALYSIS AUTO W/SCOPE: CPT

## 2022-05-06 PROCEDURE — 99284 EMERGENCY DEPT VISIT MOD MDM: CPT | Mod: 25

## 2022-05-06 PROCEDURE — 87086 URINE CULTURE/COLONY COUNT: CPT

## 2022-05-06 PROCEDURE — 96375 TX/PRO/DX INJ NEW DRUG ADDON: CPT

## 2022-05-06 PROCEDURE — 99284 EMERGENCY DEPT VISIT MOD MDM: CPT

## 2022-05-06 PROCEDURE — 84702 CHORIONIC GONADOTROPIN TEST: CPT

## 2022-05-06 RX ORDER — SODIUM CHLORIDE 9 MG/ML
1000 INJECTION INTRAMUSCULAR; INTRAVENOUS; SUBCUTANEOUS ONCE
Refills: 0 | Status: COMPLETED | OUTPATIENT
Start: 2022-05-06 | End: 2022-05-06

## 2022-05-06 RX ADMIN — FAMOTIDINE 20 MILLIGRAM(S): 10 INJECTION INTRAVENOUS at 00:26

## 2022-05-06 RX ADMIN — ONDANSETRON 4 MILLIGRAM(S): 8 TABLET, FILM COATED ORAL at 00:26

## 2022-05-06 RX ADMIN — SODIUM CHLORIDE 1000 MILLILITER(S): 9 INJECTION INTRAMUSCULAR; INTRAVENOUS; SUBCUTANEOUS at 09:30

## 2022-05-06 RX ADMIN — LIDOCAINE 10 MILLILITER(S): 4 CREAM TOPICAL at 00:25

## 2022-05-06 RX ADMIN — Medication 30 MILLILITER(S): at 00:26

## 2022-05-06 NOTE — ED PROVIDER NOTE - NSFOLLOWUPINSTRUCTIONS_ED_ALL_ED_FT
Please return to the emergency room at 8am for an abdominal ultrasound and further evaluation. Please return to the emergency room at 8am for an abdominal ultrasound and further evaluation.      [facility]                                                                                                                                                                       Abdominal Pain, Adult      Pain in the abdomen (abdominal pain) can be caused by many things. Often, abdominal pain is not serious and it gets better with no treatment or by being treated at home. However, sometimes abdominal pain is serious.    Your health care provider will ask questions about your medical history and do a physical exam to try to determine the cause of your abdominal pain.      Follow these instructions at home:    Medicines     •Take over-the-counter and prescription medicines only as told by your health care provider.      • Do not take a laxative unless told by your health care provider.        General instructions      •Watch your condition for any changes.      •Drink enough fluid to keep your urine pale yellow.      •Keep all follow-up visits as told by your health care provider. This is important.        Contact a health care provider if:    •Your abdominal pain changes or gets worse.      •You are not hungry or you lose weight without trying.      •You are constipated or have diarrhea for more than 2–3 days.      •You have pain when you urinate or have a bowel movement.      •Your abdominal pain wakes you up at night.      •Your pain gets worse with meals, after eating, or with certain foods.      •You are vomiting and cannot keep anything down.      •You have a fever.      •You have blood in your urine.        Get help right away if:    •Your pain does not go away as soon as your health care provider told you to expect.      •You cannot stop vomiting.      •Your pain is only in areas of the abdomen, such as the right side or the left lower portion of the abdomen. Pain on the right side could be caused by appendicitis.      •You have bloody or black stools, or stools that look like tar.      •You have severe pain, cramping, or bloating in your abdomen.    •You have signs of dehydration, such as:  •Dark urine, very little urine, or no urine.      •Cracked lips.      •Dry mouth.      •Sunken eyes.      •Sleepiness.      •Weakness.        •You have trouble breathing or chest pain.        Summary    •Often, abdominal pain is not serious and it gets better with no treatment or by being treated at home. However, sometimes abdominal pain is serious.      •Watch your condition for any changes.      •Take over-the-counter and prescription medicines only as told by your health care provider.      •Contact a health care provider if your abdominal pain changes or gets worse.      •Get help right away if you have severe pain, cramping, or bloating in your abdomen.      This information is not intended to replace advice given to you by your health care provider. Make sure you discuss any questions you have with your health care provider.      Document Revised: 02/05/2021 Document Reviewed: 04/27/2020    Elsevier Patient Education © 2022 [employer]

## 2022-05-06 NOTE — ED PROVIDER NOTE - NSTIMEPROVIDERCAREINITIATE_GEN_ER
06-May-2022 09:07 Acute Care - Speech Language Pathology   Swallow Re-Evaluation [facility]   Clinical Swallow Re-Evaluation     Patient Name: Saturnino Henderson  : 1997  MRN: [id number]  Today's Date: 2021               Admit Date: 2021    Visit Dx:     ICD-10-CM ICD-9-CM   1. Seizure ([facility])  R56.9 780.39   2. Status epilepticus ([facility])  G40.901 345.3   3. Acute respiratory failure with hypoxia ([facility])  J96.01 518.81   4. Hyponatremia  E87.1 276.1   5. Leukocytosis, unspecified type  D72.829 288.60   6. Dysphagia, unspecified type  R13.10 787.20     Patient Active Problem List   Diagnosis   • Status epilepticus (HCC)   • Essential hypertension   • Retinal detachment   • Acute hypoxemic respiratory failure (HCC)   • TBI (traumatic brain injury) ([facility])     Past Medical History:   Diagnosis Date   • Seizures ([facility])    • TBI (traumatic brain injury) ([facility])     Age 8     History reviewed. No pertinent surgical history.    SLP Recommendation and Plan  SLP Swallowing Diagnosis: swallow WFL, other (see comments) (much improved c/t yesterday's assessment) (21)  SLP Diet Recommendation: regular textures, thin liquids (21)  Recommended Precautions and Strategies: other (see comments) (supervision to reduce impulsivity and smaller sips as needed) (21)  SLP Rec. for Method of Medication Administration: as tolerated (21)     Monitor for Signs of Aspiration: notify SLP if any concerns (21)     Swallow Criteria for Skilled Therapeutic Interventions Met: no problems identified which require skilled intervention (21)        Therapy Frequency (Swallow): evaluation only (21)                            Plan of Care Reviewed With: patient, family  Progress: improving      SWALLOW EVALUATION (last 72 hours)     SLP Adult Swallow Evaluation     Row Name 21 [id number]                Rehab Evaluation    Document Type re-evaluation  - evaluation  -AC        Subjective Information -- no complaints  -       Patient Observations alert; cooperative  - alert; cooperative  -       Patient/Family/Caregiver Comments/Observations -- Pt confused. Spouse present.  -       Care Plan Review evaluation/treatment results reviewed; patient/other agree to care plan  - --       Care Plan Review, Other Participant(s) family  - --       Patient Effort -- good  -                General Information    Patient Profile Reviewed -- yes  -       Pertinent History Of Current Problem -- Adm status epilepticus, resp failure. Intubated 11/17-11/18 (~25 hrs). Failed RN post-extubation screen. Hx TBI w/ ICH age 8.  -       Current Method of Nutrition NPO  x ice chips  - NPO  -AC       Precautions/Limitations, Vision -- WFL; for purposes of eval  -AC       Precautions/Limitations, Hearing -- WFL; for purposes of eval  -AC       Prior Level of Function-Communication -- unknown  -       Prior Level of Function-Swallowing safe, efficient swallowing in all situations  - no diet consistency restrictions  -       Plans/Goals Discussed with patient and family; agreed upon  - patient; spouse/S.O.; agreed upon  -       Barriers to Rehab -- medically complex  -       Patient's Goals for Discharge return to PO diet  - return to PO diet  -       Family Goals for Discharge patient able to return to PO diet  - family did not state  -                Pain    Additional Documentation Pain Scale: Numbers Pre/Post-Treatment (Group)  - Pain Scale: FACES Pre/Post-Treatment (Group)  -                Pain Scale: Numbers Pre/Post-Treatment    Pretreatment Pain Rating 0/10 - no pain  - --       Posttreatment Pain Rating 0/10 - no pain  - --                Pain Scale: FACES Pre/Post-Treatment    Pain: FACES Scale, Pretreatment -- 0-->no hurt  -AC       Posttreatment Pain Rating -- 0-->no hurt  -AC                Oral Motor Structure and Function    Dentition Assessment --  "natural, present and adequate  -AC       Secretion Management -- WNL/WFL  -AC       Mucosal Quality -- dry  -AC       Volitional Swallow -- WFL  -AC       Volitional Cough -- weak  -AC                Oral Musculature and Cranial Nerve Assessment    Oral Motor General Assessment -- generalized oral motor weakness  -                General Eating/Swallowing Observations    Eating/Swallowing Skills -- fed by SLP; self-fed; other (see comments)  needed assist - impulsive self-feeding behaviors  -AC       Positioning During Eating -- upright 90 degree; upright in bed  -       Utensils Used -- spoon; cup; straw  -       Consistencies Trialed -- ice chips; thin liquids; nectar/syrup-thick liquids; pureed  -AC                Clinical Swallow Eval    Oral Prep Phase WFL  -SM WFL  -AC       Oral Transit WFL  -SM WFL  -AC       Oral Residue WFL  -SM WFL  -AC       Pharyngeal Phase -- suspected pharyngeal impairment  -       Clinical Swallow Evaluation Summary No s/s aspiration until impulsive sipping of thin liquids with liquid mix/wash following saltine cracker. When cued to slow down, no s/s aspiration.  - --                Pharyngeal Phase Concerns    Pharyngeal Phase Concerns -- cough; throat clear  -       Cough -- all consistencies  -       Throat Clear -- all consistencies  -AC       Pharyngeal Phase Concerns, Comment -- At first, no overt clinical s/sxs aspiration w/ thin liquid and puree trials. Then pt exhibited hard coughing following straw drinks of thin liquid. Several minutes later, cont'd to exhibit coughing and throat clearing w/ all PO trials x single ice chips. At one point, pt coughed up what he thought was a \"tonsil rock.\" RN notified.  -                Clinical Impression    SLP Swallowing Diagnosis swallow WFL; other (see comments)  much improved c/t yesterday's assessment  - suspected pharyngeal dysphagia  -       Functional Impact risk of aspiration/pneumonia  r/t impulsivity  - " risk of aspiration/pneumonia; risk of malnutrition; risk of dehydration  -       Rehab Potential/Prognosis, Swallowing -- good, to achieve stated therapy goals  -       Swallow Criteria for Skilled Therapeutic Interventions Met no problems identified which require skilled intervention  - demonstrates skilled criteria  -                Recommendations    Therapy Frequency (Swallow) evaluation only  - --       SLP Diet Recommendation regular textures; thin liquids  - NPO; other (see comments)  except 2-3 ice chips w/ staff, after oral care, and as tolerated  -       Recommended Diagnostics -- reassess via clinical swallow evaluation  -       Recommended Precautions and Strategies other (see comments)  supervision to reduce impulsivity and smaller sips as needed  - --       Oral Care Recommendations Oral Care BID/PRN  - Oral Care BID/PRN; Suction toothbrush  -       SLP Rec. for Method of Medication Administration as tolerated  - meds via alternate route  -       Monitor for Signs of Aspiration notify SLP if any concerns  - --       Anticipated Discharge Disposition (SLP) -- anticipate therapy at next level of care  -             User Key  (r) = Recorded By, (t) = Taken By, (c) = Cosigned By    Initials Name Effective Dates     Camille Joiner, MS CCC-SLP 06/16/21 -      Luba Vasquez, MS CCC-SLP 06/16/21 -                 EDUCATION  The patient has been educated in the following areas:   Dysphagia (Swallowing Impairment) Modified Diet Instruction.              Time Calculation:    Time Calculation- SLP     Row Name 11/19/21 1033             Time Calculation- [facility]    SLP Start Time 0945  -      SLP Received On 11/19/21  -              Untimed Charges    85191-PM Eval Oral Pharyng Swallow Minutes 40  -SM              Total Minutes    Untimed Charges Total Minutes 40  -SM       Total Minutes 40  -SM            User Key  (r) = Recorded By, (t) = Taken By, (c) = Cosigned By     Initials Name Provider Type     Camille Joiner, MS CCC-SLP Speech and Language Pathologist                Therapy Charges for Today     Code Description Service Date Service Provider Modifiers Qty    [id number] HC ST EVAL ORAL PHARYNG SWALLOW 3 11/19/2021 Camille Joiner, MS CCC-SLP GN 1               Camille Joiner, MS CCC-SLP  11/19/2021

## 2022-05-06 NOTE — ED PROVIDER NOTE - OBJECTIVE STATEMENT
32 y/o female approximately 7 weeks pregnant, LMP 3 and  was seen last night for abdominal pain and noted right upper quadrant pain on exam. Ultrasound department closed and patient elected to leave and return for right upper quadrant sonogram. Patient still reporting abdominal pain and nausea. Patient has Reglan allergy and does not want Zofran at this time as she had it earlier. Patient denies any chest pain, shortness of breath, fever or vaginal discharge.

## 2022-05-06 NOTE — ED PROVIDER NOTE - OBJECTIVE STATEMENT
31 year old female PMH appendectomy, cholecystectomy, s/p abd hernia repair, urethral cyst surgery currently 7 weeks pregnant coming in with upper abdominal pain for the past 3 days that worsens with PO intake, and pain more on right side. states has also been having nausea but no signficant vomiting. also states having some dysuria. Denies fevers, chills, sweats, back pains, cp, sob, palpitations, vaginal bleeding, vaginal discharge. saw her OBGYN today and had an US with [facility] and [facility].

## 2022-05-06 NOTE — ED PROVIDER NOTE - PROGRESS NOTE DETAILS
labs are unremarkable. ua no uti. pain mildly improved with GI cocktail but still with pain. 2/2 pregnancy no ct/xr at this time- discussed with patient about staying in ED for US in AM vs. going home and coming back for abd US in AM and pt prefers to go home and come back at 8am for abd US to eval liver/CBD. will dc. f/u in AM in ED for US. return precautions discussed.

## 2022-05-06 NOTE — ED PROVIDER NOTE - PROGRESS NOTE DETAILS
sono shows Status post cholecystectomy. No intra or extrahepatic biliary ductal   dilatation.  Pt given copy of all results.  pt to follow up with clinic.

## 2022-05-06 NOTE — ED PROVIDER NOTE - CLINICAL SUMMARY MEDICAL DECISION MAKING FREE TEXT BOX
31 year old female pregnant with upper abd pain and urinary complaints. PE as above.  labs, GI cocktail, UA, reassess

## 2022-05-06 NOTE — ED PROVIDER NOTE - CLINICAL SUMMARY MEDICAL DECISION MAKING FREE TEXT BOX
Rule out retained stone given history of cholecystectomy. Already did labs today, will do IV fluids and sonogram.

## 2022-05-06 NOTE — ED PROVIDER NOTE - PATIENT PORTAL LINK FT
You can access the FollowMyHealth Patient Portal offered by [facility] by registering at the following website: [email or web address]. By joining [employer]’s FollowMyHealth portal, you will also be able to view your health information using other applications (apps) compatible with our system.

## 2022-05-06 NOTE — ED PROVIDER NOTE - MUSCULOSKELETAL, MLM
Right upper quadrant pain. Spine appears normal, range of motion is not limited, no muscle or joint tenderness

## 2022-05-06 NOTE — ED ADULT NURSE NOTE - OBJECTIVE STATEMENT
pt is here for abdominal pain.  d/c in the morning from [facility], c/o RUQ pain, denies N/V/D,

## 2022-05-06 NOTE — ED PROVIDER NOTE - PATIENT PORTAL LINK FT
You can access the FollowMyHealth Patient Portal offered by [facility] by registering at the following website: [email or web address]. By joining [employer]’s FollowMyHealth portal, you will also be able to view your health information using other applications (apps) compatible with our system.

## 2022-05-08 LAB
CULTURE RESULTS: SIGNIFICANT CHANGE UP
SPECIMEN SOURCE: SIGNIFICANT CHANGE UP

## 2022-05-10 NOTE — CONSULT NOTE ADULT - CONSULT REASON
Date of Consult:    5/10/2022    INDICATION(s) FOR CARDIOLOGY CONSULT:    Ventricular ectopy   Referring Physician:    Dr. Jerod Phelps     HPI:      71year old female pmh ESRD on HD, iron deficiency anemia, moderate arterial calcification (CT abd pelvis 10/2021). Presented 5/9/22 for evaluation of chest pain, weakness and bradycardia noted during HD, treated with atropine by EMS. Temp 101.1 noted today. EKG NSR LBBB unchanged, PVCs. Trop negative. K / Mg, TSH  wnl. WBC 14. Echo pending. Telemetry with frequent PVCs, couplets, paroxysmal AF,  no bradycardia noted. Cardiology consulted from ED. Patient Active Problem List    Diagnosis Date Noted   â¢ Symptomatic bradycardia 05/09/2022     Priority: Low   â¢ Multiple closed fractures of pelvis with stable disruption of pelvic [place] (CMS/[facility]) 02/28/2022     Priority: Low   â¢ Contusion of scalp 02/22/2022     Priority: Low   â¢ Status post kidney transplant 04/28/2015     Priority: Low   â¢ CKD (chronic kidney disease) stage 4, GFR 15-29 ml/min (CMS/[facility]) 04/06/2020   â¢ Iron deficiency anemia 04/06/2020   â¢ Polycystic kidney disease    â¢ Pseudotumor cerebri      Past Medical History:   Diagnosis Date   â¢ Anemia    â¢ Fracture 01/20/2022    Pelvis   â¢ Polycystic kidney disease May 10, 1995    Transplant    â¢ Pseudotumor cerebri    â¢ Status post kidney transplant 4/28/2015     Past Surgical History:   Procedure Laterality Date   â¢ Hysterectomy  age 45   â¢ Kidney transplant     â¢ Oophorectomy     â¢ Tonsillectomy and adenoidectomy       Prior to Admission medications    Medication Sig Start Date End Date Taking? Authorizing Provider   ondansetron [facility]) 4 MG/2ML injectable solution Inject 4 mg into the vein daily as needed (at dialysis). Yes Outside Provider   amoxicillin (AMOXIL) 500 MG tablet Take 500 mg by mouth 2 times daily.  5/5/22  Yes Provider Not In System   HYDROcodone-acetaminophen (NORCO) 5-325 MG per tablet Take 1 tablet by mouth every 6 hours as needed for Pain. 4/14/22  Yes Sintia Avalos MD   Cholecalciferol 25 mcg (1,000 units) capsule Take 25 mcg by mouth daily. 3/24/22  Yes Provider Not In System   [employer] Super B Complex/C Tab Take 1 tablet by mouth daily. On non dialysis days 2/28/22  Yes Genet Gray MD   [employer] Super B Complex/C Tab Take 2 tablets by mouth 3 days a week. Take in addition to the one tablet daily on dialysis days 2/28/22  Yes Genet Gray MD   metoPROLOL succinate (TOPROL-XL) 50 MG 24 hr tablet Take 50 mg by mouth every morning. 12/28/21  Yes Outside Provider   aspirin-acetaminophen-caffeine ([address]) 394-586-10 MG per tablet Take 0.5 tablets by mouth daily. Do not start before March 24, 2022. Dose: Â½ tablet  for headaches and moderate pain 3/24/22  Yes Outside Provider   senna (SENOKOT) 8.6 MG tablet Take 1 tablet by mouth daily as needed for Constipation. Yes Outside Provider   calcium carbonate (TUMS) 500 MG chewable tablet Chew 1 tablet by mouth 3 times daily (with meals). Yes Outside Provider   Epoetin Migue (EPOGEN IJ) Inject 8,000 Units as directed 3 days a week. Monday, Wednesday and Friday. Yes Outside Provider   Doxercalciferol (HECTOROL IV) Inject 0.5 mcg into the vein 3 days a week. Yes Outside Provider   Iron Sucrose (VENOFER IV) Inject 100 mg into the vein 1 day a week. On Monday. Yes Outside Provider   cycloSPORINE modified 25 MG capsule Take 50 mg by mouth 2 times daily. Dose: 2 capsules (=50mg)   Yes Outside Provider   predniSONE (DELTASONE) 5 MG tablet Take 5 mg by mouth daily. ongoing   Yes Outside Provider   furosemide (LASIX) 80 MG tablet Take 80 mg by mouth 4 days a week. On non dialysis days. Yes Outside Provider   aspirin 325 MG tablet Take 325 mg by mouth every other day. On dialysis nights. Yes Outside Provider   Ascorbic Acid (VITAMIN C) 1000 MG tablet Take 1,000 mg by mouth daily as needed (sleep).     Yes Outside Provider   acetaminophen (TYLENOL) 500 MG tablet Take 500 mg by mouth every 6 "hours as needed for Pain. for mild pain 3/24/22  Yes Outside Provider       ALLERGIES:   Allergen Reactions   â¢ Ciprofloxacin RASH   â¢ Bandaid [Adhesive   (Environmental)] RASH   â¢ Doxycycline Other (See Comments)     ""When I had this dental issue in Oct 2021 I was put on doxycycline. It may or may have not caused my kidneys to shut down and stop producing urine. \"" Pt would like it added to her allergy list at this time. â¢ Iodine   (Environmental Or Med) RASH     Social History     Tobacco Use   â¢ Smoking status: Never Smoker   â¢ Smokeless tobacco: Never Used   Substance Use Topics   â¢ Alcohol use: Yes     Comment: occasional     Family History   Problem Relation Age of Onset   â¢ Kidney disease Father         kidney disease       Review of Systems  Constitutional:  Negative for fever, chills, diaphoresis, activity change, appetite change, fatigue and unexpected weight change. HENT:  Negative for hearing loss, nosebleeds, congestion and neck pain. Eyes:  Negative for photophobia and visual disturbance. Respiratory:  Negative for apnea, cough, choking, chest tightness, shortness of breath, wheezing and stridor. Cardiovascular:  Negative for chest pain, palpitations, lightheadedness, near-syncope, syncope or leg swelling. Gastrointestinal:  Negative for nausea, vomiting, abdominal pain, abdominal distention, diarrhea, constipation, blood in stool, throwing up blood and anal bleeding. Genitourinary:  Negative for dysuria, frequency, hematuria and difficulty urinating. Musculoskeletal:  Negative for myalgias, back pain, joint swelling, arthralgias and gait problem. Skin:  Negative for color change, pallor, rash and wound. Neurological:  Negative for dizziness, vision changes, seizures, numbness, new motor or sensory deficits or headaches. Hematological:  Negative for adenopathy. Does not bruise/bleed easily.   Psychiatric/Behavioral:  Negative for hallucinations, behavioral problems, confusion, sleep " disturbance, dysphoric mood, decreased concentration and agitation. Objective:    Vitals Last Value 24-Hour Range   Temperature (!) 101.1 Â°F (38.4 Â°C) (05/10/22 0731) Temp  Min: 97.4 Â°F (36.3 Â°C)  Max: 101.1 Â°F (38.4 Â°C)   Pulse 90 (05/10/22 0731) Pulse  Min: 64  Max: 90   Respiratory 18 (05/10/22 0731) Resp  Min: 15  Max: 23   Non-Invasive  Blood Pressure 120/57 (05/10/22 0731) BP  Min: 111/66  Max: 146/68   Arterial  Blood Pressure   No data recorded   Pulse Oximetry 94 % (05/10/22 0731) SpO2  Min: 87 %  Max: 98 %     Vitals Today Admission   Weight 91.2 kg (201 lb 1 oz) (05/09/22 2055) Weight: 91.2 kg (201 lb 1 oz) (05/09/22 2055)     Weight over the past 48 hours:  Patient Vitals for the past 48 hrs:   Weight   05/09/22 2055 91.2 kg (201 lb 1 oz)        Intake/Output:  I/O last 3 completed shifts:  In: -   Out: 200 [Urine:200]    Intake/Output Summary (Last 24 hours) at 5/10/2022 1012  Last data filed at 5/9/2022 1840  Gross per 24 hour   Intake --   Output 200 ml   Net -200 ml       Physical Exam  Constitutional:  Oriented to person, place, and time. Appears well-developed and well-nourished. No distress. HENT:  No obvious deformity. Head:  Normocephalic and atraumatic. Eyes:  Conjunctivae are normal.  Pupils are equal, round. No scleral pallor, icterus or cyanosis. Neck:  Normal range of motion. Neck supple. No JVD (jugular venous distention) present. Carotid bruit is not present. No tracheal deviation present. No thyromegaly present. Cardiovascular:  Normal rate, regular rhythm and intact distal pulses. Exam reveals no gallop and no friction rub. No murmur heard. Pulmonary/Chest:  Effort normal and breath sounds normal.  No respiratory distress. No wheezes. No rales. No tenderness. Abdominal:  Soft. Bowel sounds are normal.  No distention and no mass. There is no tenderness. There is no rebound and no guarding. Musculoskeletal:  Normal range of motion.   No edema and no tenderness. Lymphadenopathy:  No cervical adenopathy. Neurological:  Alert and oriented to person, place, and time. Skin:  Skin is warm and dry. No rash noted. Not diaphoretic. No erythema. No pallor. Psychiatric:  Normal mood and affect. Behavior is normal.  Judgment and thought content normal.    Laboratory Results:  Lab Results   Component Value Date    SODIUM 133 (L) 05/10/2022    POTASSIUM 4.6 05/10/2022    BUN 47 (H) 05/10/2022    CREATININE 4.14 (H) 05/10/2022    WBC 14.9 (H) 05/10/2022    HCT 26.8 (L) 05/10/2022    HGB 8.2 (L) 05/10/2022    INR 1.4 05/09/2022    GLUCOSE 99 05/10/2022    TSH 2.444 05/09/2022    CHOLESTEROL 296 (H) 05/21/2020    HDL 32 (L) 05/21/2020    CALCLDL 206 (H) 05/21/2020    TRIGLYCERIDE 291 (H) 05/21/2020    MG 2.2 05/10/2022     CT A/P 10/2021  Â   IMPRESSION:  Â   No acute intra-abdominal or pelvic abnormality. No findings of  constipation. Â   Complex native and right pelvic transplant renal cysts with presumed areas  of prior hemorrhage. No definite tumor. Â   Small right pleural effusion and dependent lung base atelectasis. Â   Anterior abdominal subcutaneous edema. Â   Cholelithiasis. Â   Minimal pelvic ascites. There is moderate arterial calcification. Echo 5/9/22  Final Impressions  Severely decreased left ventricular systolic function. Left ventricular ejection fraction; 30 %. Moderately increased left ventricular chamber size. Grade II diastolic dysfunction of the left ventricle (pseudonormal filling pattern). Flattening of the septum in systole consistent with right ventricular pressure overload. Moderate pulmonary hypertension; RVSP 54 mmHg. Mild to moderate mitral valve regurgitation. Moderate tricuspid valve regurgitation. Agitated saline was injected through a peripheral vein and did show evidence of a shunt. No prior study available for comparison.               Assessment:  71year old female pmh ESRD on HD, iron deficiency anemia, moderate arterial calcification (CT abd pelvis 10/2021). Presented 5/9/22 for evaluation of chest pain, weakness and bradycardia noted during HD, treated with atropine by EMS. Temp 101.1 noted today. EKG NSR LBBB unchanged, PVCs. Trop negative. K / Mg, TSH  wnl. WBC 14. Echo pending. Telemetry with frequent PVCs, couplets, paroxysmal AF,  no bradycardia noted. Cardiology consulted from ED. 1. Ventricular ectopy / PVCs / ? paroxysmal AF / LBBB  Presented w/ bradycardia following HD, treated with atropine  Tele with frequent PVC's, couplets, 3 beat runs NSVT and ? transient AF  No bradycardia noted   Chronic LBBB  Toprol 50 mg daily pta , continued   Echo EF 30% , mild to moderate MR, moderate TR, RVSP 54 mm Hg - no old study for comparison   Cont GDMT - continue Toprol, start lisinopril if ok with nephrology   Cath to be considered       INGA Quiroz seen in conjunction with the attending cardiologist, Dr. Jett Dai     Patient seen and examined with Ms Floridalma Elizabeth NP. Agree with the history and physical, past medical history, surgical history, social history, and review of systems. Medication list, vitals, and labs reviewed. I have reviewed and edited the above note as needed. I have personaly formulated the clinical impression and recommendations as stated. Thank you, Dr Sarah Hamilton MD, for the opportunity to participate in her care. Please call me if any questions or concerns at [phone], any time of the day.      Dasha Gallego MD, MS, [facility]  Interventional Cardiologist   Pager ([phone] with new onset dilated cardiomyopathy (LVEF 30%; LVEDV 264 ml), anasarca due to her resistance towards aggressive dialysis as she can only tolerate the machine so much, minimal Tn leak of 54, chest pain that she denies now, bradycardia that is common during dialysis for her needing atropine on admission, anasarca on exam and potpouri of arrythmias on telemetry includingn VT, AF, on the dysuria background of chronic LBBB. Recommend aggressive dialysis. Decrease Toprol to 25 bid. Once dialyzed, then start heart failure meds. Stress MPI when can lay flat as refuses contrast due to JEAN risk, as she still makes 200 cc of urine.

## 2022-06-03 NOTE — ED ADULT NURSE NOTE - PERIPHERAL VASCULAR
A gentleman answered the initial call and provided the phone number of [phone] to contact Susan at. L/M FOR SUSAN  Following up on appointment from yesterday regarding the hamstring.  Requested she contact the office if she wishes to pursue the consultation with Dr. Timmy Yu as referred by Pb Berkowitz PA-C.
WDL

## 2022-06-14 NOTE — ED ADULT TRIAGE NOTE - AS O2 DELIVERY
ED Attending Note      Patient : Perry Arias Age: 35 year old Sex: male   MRN: [id number] Encounter Date: 6/13/2022    I participated in the following activities of this patients care: The medical history, the physical exam, medical decision making.  I personally performed: Supervision of the patient's care, the medical history, the physical exam, the medical decision making.  The case was discussed with: The resident  Evaluation and management service: I agree with the evaluation and management decisions made in this patient's care.  Results interpretation: I agree with the study interpretation in this patient's care, I agree with the documentation of the study interpretation.      History     Chief Complaint   Patient presents with   • Suicidal         35 year-old male presents under [employer] custody for suicidal ideation.  Patient apparently made a remark that he wants to jump off a 2 foot sink and attempt to kill himself.  He then rambles on about being sexually assaulted by [employer] and then fabricating stories to keep him incarcerated.  He was seen at [facility] for same earlier today, had a full psychiatric evaluation.  When told that our protocol would be to draw blood for crisis evaluation, patient declined so recanted his statement.  He states that he is no longer suicidal and wishes to leave.  Does not wish to talk to the crisis worker.  Denies HI or hallucinations.          ALLERGIES:   Allergen Reactions   • Ibuprofen Nausea & Vomiting       There are no discharge medications for this patient.      There are no discharge medications for this patient.      Past History       No past medical history on file.    No past surgical history on file.    No family history on file.    Social History     Tobacco Use   • Smoking status: Not on file   • Smokeless tobacco: Not on file   Substance Use Topics   • Alcohol use: Not on file   • Drug use: Not on file       Review of Systems   Constitutional: Negative for activity  change, appetite change, chills, diaphoresis, fatigue and fever.   HENT: Negative for congestion, ear pain, facial swelling, hearing loss, nosebleeds, rhinorrhea, sinus pressure, sinus pain, sneezing, sore throat, trouble swallowing and voice change.    Eyes: Negative for photophobia, pain, discharge, redness, itching and visual disturbance.   Respiratory: Negative for cough, chest tightness, shortness of breath, wheezing and stridor.    Cardiovascular: Negative for chest pain, palpitations and leg swelling.   Gastrointestinal: Negative for abdominal distention, abdominal pain, blood in stool, constipation, diarrhea, nausea, rectal pain and vomiting.   Genitourinary: Negative for dysuria, flank pain, frequency, hematuria and urgency.   Musculoskeletal: Negative for back pain, joint swelling, neck pain and neck stiffness.   Skin: Negative for color change, pallor and rash.   Neurological: Negative for dizziness, seizures, facial asymmetry, speech difficulty, light-headedness, numbness and headaches.   Psychiatric/Behavioral: Positive for suicidal ideas.       Physical Exam     ED Triage Vitals [06/13/22 2142]   ED Triage Vitals Group      Temp 97.9 °F (36.6 °C)      Heart Rate (!) 56      Resp 16      /81      SpO2 98 %      EtCO2 mmHg       Height       Weight       Weight Scale Used       BMI (Calculated)       IBW/kg (Calculated)        Physical Exam  Vitals and nursing note reviewed.   Constitutional:       Appearance: Normal appearance.   HENT:      Head: Normocephalic and atraumatic.      Mouth/Throat:      Mouth: Mucous membranes are moist.      Neck: Normal range of motion and neck supple.   Eyes:      Extraocular Movements: Extraocular movements intact.      Conjunctiva/sclera: Conjunctivae normal.      Pupils: Pupils are equal, round, and reactive to light.   Cardiovascular:      Rate and Rhythm: Normal rate and regular rhythm.      Pulses: Normal pulses.      Heart sounds: Normal heart sounds. No  murmur heard.    No friction rub. No gallop.   Pulmonary:      Effort: Pulmonary effort is normal. No respiratory distress.      Breath sounds: Normal breath sounds. No stridor. No rales.   Chest:      Chest wall: No tenderness.   Abdominal:      General: Abdomen is flat. Bowel sounds are normal. There is no distension.      Palpations: Abdomen is soft. There is no mass.      Tenderness: There is no abdominal tenderness. There is no right CVA tenderness, left CVA tenderness, guarding or rebound.   Musculoskeletal:         General: No swelling, tenderness or deformity. Normal range of motion.   Skin:     General: Skin is warm and dry.      Capillary Refill: Capillary refill takes less than 2 seconds.      Coloration: Skin is not pale.      Findings: No rash.   Neurological:      General: No focal deficit present.      Mental Status: He is alert and oriented to person, place, and time.      Cranial Nerves: No cranial nerve deficit.      Sensory: No sensory deficit.      Motor: No weakness.      Gait: Gait normal.   Psychiatric:         Attention and Perception: Attention and perception normal.         Mood and Affect: Mood is anxious. Affect is labile.         Speech: Speech is delayed.         Behavior: Behavior is hyperactive. Behavior is cooperative.         Thought Content: Thought content normal. Thought content is not paranoid or delusional. Thought content does not include homicidal or suicidal ideation. Thought content does not include homicidal or suicidal plan.         Cognition and Memory: Cognition and memory normal.         Judgment: Judgment is impulsive. Judgment is not inappropriate.         Procedures    Lab Results     Labs Reviewed   COMPREHENSIVE METABOLIC PANEL   CBC WITH DIFFERENTIAL    Narrative:     The following orders were created for panel order CBC with Automated Differential.  Procedure                               Abnormality         Status                     ---------                                -----------         ------                     CBC with Automated Dif...[[id number]                                                   [id number]]                                                                     [facility] and [address] [place] [address] LAWRENCE Lovelace,  MD  06/13/22 4853     room air

## 2022-07-05 ENCOUNTER — EMERGENCY (EMERGENCY)
Facility: HOSPITAL | Age: 32
LOS: 1 days | Discharge: HOME | End: 2022-07-05
Attending: STUDENT IN AN ORGANIZED HEALTH CARE EDUCATION/TRAINING PROGRAM | Admitting: STUDENT IN AN ORGANIZED HEALTH CARE EDUCATION/TRAINING PROGRAM

## 2022-07-05 VITALS
HEART RATE: 86 BPM | TEMPERATURE: 97 F | HEIGHT: 58 IN | SYSTOLIC BLOOD PRESSURE: 105 MMHG | OXYGEN SATURATION: 100 % | DIASTOLIC BLOOD PRESSURE: 678 MMHG | RESPIRATION RATE: 20 BRPM

## 2022-07-05 VITALS
OXYGEN SATURATION: 100 % | DIASTOLIC BLOOD PRESSURE: 60 MMHG | TEMPERATURE: 98 F | SYSTOLIC BLOOD PRESSURE: 105 MMHG | HEART RATE: 87 BPM | RESPIRATION RATE: 16 BRPM

## 2022-07-05 DIAGNOSIS — S62.309A UNSPECIFIED FRACTURE OF UNSPECIFIED METACARPAL BONE, INITIAL ENCOUNTER FOR CLOSED FRACTURE: Chronic | ICD-10-CM

## 2022-07-05 DIAGNOSIS — Z87.42 PERSONAL HISTORY OF OTHER DISEASES OF THE FEMALE GENITAL TRACT: Chronic | ICD-10-CM

## 2022-07-05 DIAGNOSIS — Z98.89 OTHER SPECIFIED POSTPROCEDURAL STATES: Chronic | ICD-10-CM

## 2022-07-05 LAB
ANION GAP SERPL CALC-SCNC: 15 MMOL/L — HIGH (ref 7–14)
APPEARANCE UR: CLEAR — SIGNIFICANT CHANGE UP
BASOPHILS # BLD AUTO: 0.03 K/UL — SIGNIFICANT CHANGE UP (ref 0–0.2)
BASOPHILS NFR BLD AUTO: 0.3 % — SIGNIFICANT CHANGE UP (ref 0–2)
BILIRUB UR-MCNC: NEGATIVE — SIGNIFICANT CHANGE UP
BUN SERPL-MCNC: 7 MG/DL — SIGNIFICANT CHANGE UP (ref 7–23)
CALCIUM SERPL-MCNC: 9.2 MG/DL — SIGNIFICANT CHANGE UP (ref 8.4–10.5)
CHLORIDE SERPL-SCNC: 100 MMOL/L — SIGNIFICANT CHANGE UP (ref 98–107)
CO2 SERPL-SCNC: 19 MMOL/L — LOW (ref 22–31)
COLOR SPEC: YELLOW — SIGNIFICANT CHANGE UP
CREAT SERPL-MCNC: 0.51 MG/DL — SIGNIFICANT CHANGE UP (ref 0.5–1.3)
DIFF PNL FLD: NEGATIVE — SIGNIFICANT CHANGE UP
EGFR: 129 ML/MIN/1.73M2 — SIGNIFICANT CHANGE UP
EOSINOPHIL # BLD AUTO: 0.05 K/UL — SIGNIFICANT CHANGE UP (ref 0–0.5)
EOSINOPHIL NFR BLD AUTO: 0.5 % — SIGNIFICANT CHANGE UP (ref 0–6)
GLUCOSE SERPL-MCNC: 83 MG/DL — SIGNIFICANT CHANGE UP (ref 70–99)
GLUCOSE UR QL: NEGATIVE — SIGNIFICANT CHANGE UP
HCG SERPL-ACNC: SIGNIFICANT CHANGE UP MIU/ML
HCT VFR BLD CALC: 37.9 % — SIGNIFICANT CHANGE UP (ref 34.5–45)
HGB BLD-MCNC: 12.6 G/DL — SIGNIFICANT CHANGE UP (ref 11.5–15.5)
IANC: 8.42 K/UL — HIGH (ref 1.8–7.4)
IMM GRANULOCYTES NFR BLD AUTO: 0.4 % — SIGNIFICANT CHANGE UP (ref 0–1.5)
KETONES UR-MCNC: ABNORMAL
LEUKOCYTE ESTERASE UR-ACNC: NEGATIVE — SIGNIFICANT CHANGE UP
LYMPHOCYTES # BLD AUTO: 1.33 K/UL — SIGNIFICANT CHANGE UP (ref 1–3.3)
LYMPHOCYTES # BLD AUTO: 12.9 % — LOW (ref 13–44)
MCHC RBC-ENTMCNC: 33.2 GM/DL — SIGNIFICANT CHANGE UP (ref 32–36)
MCHC RBC-ENTMCNC: 33.2 PG — SIGNIFICANT CHANGE UP (ref 27–34)
MCV RBC AUTO: 100 FL — SIGNIFICANT CHANGE UP (ref 80–100)
MONOCYTES # BLD AUTO: 0.44 K/UL — SIGNIFICANT CHANGE UP (ref 0–0.9)
MONOCYTES NFR BLD AUTO: 4.3 % — SIGNIFICANT CHANGE UP (ref 2–14)
NEUTROPHILS # BLD AUTO: 8.42 K/UL — HIGH (ref 1.8–7.4)
NEUTROPHILS NFR BLD AUTO: 81.6 % — HIGH (ref 43–77)
NITRITE UR-MCNC: NEGATIVE — SIGNIFICANT CHANGE UP
NRBC # BLD: 0 /100 WBCS — SIGNIFICANT CHANGE UP
NRBC # FLD: 0 K/UL — SIGNIFICANT CHANGE UP
PH UR: 6.5 — SIGNIFICANT CHANGE UP (ref 5–8)
PLATELET # BLD AUTO: 264 K/UL — SIGNIFICANT CHANGE UP (ref 150–400)
POTASSIUM SERPL-MCNC: 3.8 MMOL/L — SIGNIFICANT CHANGE UP (ref 3.5–5.3)
POTASSIUM SERPL-SCNC: 3.8 MMOL/L — SIGNIFICANT CHANGE UP (ref 3.5–5.3)
PROT UR-MCNC: ABNORMAL
RBC # BLD: 3.79 M/UL — LOW (ref 3.8–5.2)
RBC # FLD: 12.7 % — SIGNIFICANT CHANGE UP (ref 10.3–14.5)
SODIUM SERPL-SCNC: 134 MMOL/L — LOW (ref 135–145)
SP GR SPEC: 1.02 — SIGNIFICANT CHANGE UP (ref 1–1.05)
UROBILINOGEN FLD QL: SIGNIFICANT CHANGE UP
WBC # BLD: 10.31 K/UL — SIGNIFICANT CHANGE UP (ref 3.8–10.5)
WBC # FLD AUTO: 10.31 K/UL — SIGNIFICANT CHANGE UP (ref 3.8–10.5)

## 2022-07-05 PROCEDURE — G1004: CPT

## 2022-07-05 PROCEDURE — 70496 CT ANGIOGRAPHY HEAD: CPT | Mod: 26,MF

## 2022-07-05 PROCEDURE — 99284 EMERGENCY DEPT VISIT MOD MDM: CPT

## 2022-07-05 RX ORDER — SODIUM CHLORIDE 9 MG/ML
1000 INJECTION INTRAMUSCULAR; INTRAVENOUS; SUBCUTANEOUS ONCE
Refills: 0 | Status: COMPLETED | OUTPATIENT
Start: 2022-07-05 | End: 2022-07-05

## 2022-07-05 RX ORDER — ACETAMINOPHEN 500 MG
1000 TABLET ORAL ONCE
Refills: 0 | Status: COMPLETED | OUTPATIENT
Start: 2022-07-05 | End: 2022-07-05

## 2022-07-05 RX ORDER — ONDANSETRON 8 MG/1
4 TABLET, FILM COATED ORAL ONCE
Refills: 0 | Status: COMPLETED | OUTPATIENT
Start: 2022-07-05 | End: 2022-07-05

## 2022-07-05 RX ORDER — PROCHLORPERAZINE MALEATE 5 MG
10 TABLET ORAL ONCE
Refills: 0 | Status: DISCONTINUED | OUTPATIENT
Start: 2022-07-05 | End: 2022-07-05

## 2022-07-05 RX ORDER — PROCHLORPERAZINE MALEATE 5 MG
1000 TABLET ORAL ONCE
Refills: 0 | Status: DISCONTINUED | OUTPATIENT
Start: 2022-07-05 | End: 2022-07-05

## 2022-07-05 RX ADMIN — Medication 200 MILLIGRAM(S): at 17:41

## 2022-07-05 RX ADMIN — ONDANSETRON 4 MILLIGRAM(S): 8 TABLET, FILM COATED ORAL at 17:41

## 2022-07-05 RX ADMIN — SODIUM CHLORIDE 1000 MILLILITER(S): 9 INJECTION INTRAMUSCULAR; INTRAVENOUS; SUBCUTANEOUS at 17:41

## 2022-07-05 NOTE — ED PROVIDER NOTE - ATTENDING CONTRIBUTION TO CARE
31 yof phx of PCOS, extensive surg hx  p/w acute HA in the morning. Pt woke up feeling well and then later developed headache on top of head, gradual onset worsening throughout the day, assoc with 2 episodes of vomiting, no fevers, no neck pain, has had headaches in the past but usually not assoc with vomiting, no vision changes or neuro deficits took tylenol but vomited     well appearing, neuro intact, will give meds pain control, ctv for ro central venous thrmobus       PHx: See above.    3 hernia surgeries. Cholecystectomy, appendectomy, as well.   Not a smoker, no drugs, no drinking.

## 2022-07-05 NOTE — ED ADULT NURSE REASSESSMENT NOTE - NS ED NURSE REASSESS COMMENT FT1
Received report. Pt resting comfortably in stretcher, vitals as charted. Respirations are even and unlabored.

## 2022-07-05 NOTE — ED PROVIDER NOTE - OBJECTIVE STATEMENT
31 yof no phx extensive 31 yof no phx, extensive surg hx  p/w acute HA in the morning.     Upon waking and on toilet, pt experienced a 8/10 HA that was globally located. Pt had nosebleed as well at this time, since stopped. Pt also vomited NBNB fluid at this time as well. Pt then complained of a sensitivity to light. This has never happened to her before. She does have frequent sinus HAs, states this HA feels different. No pain behind her eyes, no unilateral pain. HA did not wake pt from sleep. No recent illnesses, no neck pain, no pain w/ flexion. No sick contacts. Her previous pregnancy was normal.     Currently, pt has HA that has not evolved and is still 8/10. Light and sound make the pain worse. Pt did take tylenol today and immediately vomited the tylenol.         PHx: See above.    3 hernia surgeries. Cholecystectomy, appendectomy, as well. 31 yof phx of PCOS, extensive surg hx  p/w acute HA in the morning.     Upon waking and on toilet, pt experienced a 8/10 HA that was globally located. Pt had nosebleed as well at this time, since stopped. Pt also vomited NBNB fluid at this time as well. Pt then complained of a sensitivity to light. This has never happened to her before. She does have frequent sinus HAs, states this HA feels different. No pain behind her eyes, no unilateral pain. HA did not wake pt from sleep. No recent illnesses, no neck pain, no pain w/ flexion. No sick contacts. Her previous pregnancy was normal.     Currently, pt has HA that has not evolved and is still 8/10. Light and sound make the pain worse. Pt did take tylenol today and immediately vomited the tylenol.         PHx: See above.    3 hernia surgeries. Cholecystectomy, appendectomy, as well.   Not a smoker, no drugs, no drinking.

## 2022-07-05 NOTE — ED PROVIDER NOTE - NSFOLLOWUPINSTRUCTIONS_ED_ALL_ED_FT
You came in with a headache. We were mostly concerned for a blood clot in your head, given your pregnancy and sudden-onset headache.     Your imaging came back normal. Your blood work was not concerning for any life-threatening diseases.     You may take your tylenol as needed, per the instructions that are safe for you and baby (every 6 hours a normal dose per the box).     Reasons to return to the ED: Cannot eat or drink, excessively vomiting. Headache worsens to a level you cannot cope with or fix/help with medications you're taking.

## 2022-07-05 NOTE — ED PROVIDER NOTE - PHYSICAL EXAMINATION
General: Pt appears unwell, reacting poorly to light in room. Hiding head under blanket.   Neuro: AxOx3. Cranial nerves 2-12 intact. Normal strength in UE and LE flexors and extensors.    HEENT: Pupils equal round reactive, 3 mm reacting to 2 mm b/l. No crusting in nose. Oropharynx moist, not dry.     Cardiac: Normal s1/s2 over all valves. No murmurs.  Resp: Lungs clear in all field, no crackles or tightness of air flow. Breathing in NAD.  Abdomen/GI/:   UE/LE: No LE edema b/l.

## 2022-07-05 NOTE — ED PROVIDER NOTE - NSICDXFAMILYHX_GEN_ALL_CORE_FT
FAMILY HISTORY:  FH: HTN (hypertension), mother    Father  Still living? No  Diabetes mellitus, type 2, Age at diagnosis: Age Unknown    Mother  Still living? Yes, Estimated age: Age Unknown  Diabetes mellitus, type 2, Age at diagnosis: Age Unknown    Sibling  Still living? Yes, Estimated age: Age Unknown  Family history of essential hypertension, Age at diagnosis: Age Unknown     detailed exam

## 2022-07-05 NOTE — ED ADULT TRIAGE NOTE - CHIEF COMPLAINT QUOTE
Pt 16 weeks pregnant. Pt c/o HA, dizziness, and nosebleed today. Pt denies any vaginal bleeding but has abd cramping.

## 2022-07-05 NOTE — ED PROVIDER NOTE - PATIENT PORTAL LINK FT
You can access the FollowMyHealth Patient Portal offered by [facility] by registering at the following website: [email or web address]. By joining [employer]’s FollowMyHealth portal, you will also be able to view your health information using other applications (apps) compatible with our system.

## 2022-07-05 NOTE — ED PROVIDER NOTE - CLINICAL SUMMARY MEDICAL DECISION MAKING FREE TEXT BOX
30 yo 16 weeks pregnant  p/w HA, photophobia, nosebleed, w/o FND.     DDx: Primary HA such as migraine, tension, cluster vs secondary HA d/t mass effect c/f dural thrombosis, IIH  Given clinical picture w/ photophobia but distribution in global pattern, seem mixed picture of migraines and tension HA.   C/f thrombosis given sudden onset and pregnancy. No PE findings indicative of mass effect.    Plan: Compazine, tylenol, 1000 ml saline, CT venogram

## 2022-07-05 NOTE — ED ADULT NURSE NOTE - OBJECTIVE STATEMENT
Break coverage RN - Received pt in room 17. pt A&OX3, ambulatory. pt with hx of asthma, endometriosis. Pt 16 weeks pregnant due date 12/13. pt c/o headache, dizziness and nose bleeds beginning today. pt also c/o abdominal cramping. vitals stable as noted. no bleeding noted at this time. no vaginal bleeding, discharge, chest pain, sob, cough, fever/chills. respirations are equal and nonlabored, no respiratory distress noted, sating 100% on room air. 20 gauge iv placed in the left ac , labs to be sent. pt stable, awaiting further plan at this time.

## 2022-08-04 NOTE — ED PROVIDER NOTE - SECONDARY DIAGNOSIS.
Face time spent with the patient was 15 minutes or less   Exacerbation of asthma, unspecified asthma severity, unspecified whether persistent

## 2022-11-28 NOTE — ASU PREOP CHECKLIST - INTERNAL PROSTHESES
[facility] ([facility]) [facility] [address]  Phone: [phone]  Fax: [phone]    LURDES Lucero CNP        November 28, 2022     Patient: Chapincito Lawler   YOB: 2012   Date of Visit: 11/28/2022       To Whom it May Concern:    Lissa Perez was seen in my clinic on 11/28/2022. He may return to school on 11/5/2022. If you have any questions or concerns, please don't hesitate to call.     Sincerely,         LURDES Lucero CNP PLATE IN THE RIGHT HAND/yes(specify)

## 2022-12-03 ENCOUNTER — NON-APPOINTMENT (OUTPATIENT)
Age: 32
End: 2022-12-03

## 2022-12-28 NOTE — ED ADULT NURSE NOTE - TEMPLATE
Impression:  - colonoscopy up to the cecum  - 3 3 polyps removed from descending colon, largest measuring 8 mm  - small mixed type hemorrhoids    Recommendations  Awaiting pathology results due to biopsy(s) performed.  Repeat colonoscopy 3 years  GI Discharge Instructions Endoscopy      12/28/2022    During your exam, the physician:    Removed polyps  Lab results will be called/mailed to you within 7-10 business days.  If you have not heard from the doctor within 10 days, call the office for results:  Dr. BRITNEY Warren, [phone]    DIET INSTRUCTIONS:  Resume your regular diet    PRESCRIPTIONS/MEDICATIONS  No new prescriptions given today    A RESPONSIBLE ADULT MUST ACCOMPANY YOU AND DRIVE YOU HOME    You had the following procedure(s) today:   Colonoscopy     There is no need to hold any aspirin or anti-inflammatory medications.     Following sedation, your judgement, perception and coordination are impaired for a minimum of 24 hours.      Therefore:  Do not drive.  Do not return to work today.  Do not operate appliances or machinery that require quick reaction time  Do not sign legal documents or be involved in work decisions  Do not smoke or drink alcoholic beverages for 24 hours  Plan to spend a few hours resting before resuming your normal routine    Please call your physician in the event that you experience any of the following or proceed to the nearest hospital in the event of an emergency:     COLONOSCOPY  Fever  Severe abdominal distention or pain. Some mild distention and/or cramping are normal after these procedures but should pass within an hour or two with the passage of air.  Rectal bleeding more than blood streaking on the toilet  tissue  Nausea or Vomiting    If you have any questions or concerns, contact Dr. BRITNEY Warren, [phone]    ADDITIONAL INSTRUCTIONS: none    General

## 2023-03-30 NOTE — ED PROVIDER NOTE - TOBACCO USE
Patient restarted himself on bupropion.  He notes that his mood has improved.  He has been on this previously.  Continue 300 mg of Wellbutrin daily.  Side-effects reviewed current this time.  No SI/HI.  Recheck in 2 weeks.   Never smoker

## 2023-07-12 NOTE — ED ADULT TRIAGE NOTE - HEIGHT IN CM
-- DO NOT REPLY / DO NOT REPLY ALL --  -- Message is from [employer] [facility] [employer] ([facility]) --    General Patient Message: Patient mom buck calling wants to speak with the doctor or nurse, states patient has history of warts and wants to know how to proceed, does the doctor want to freeze them off again or what?       Alternative phone number: none     Can a detailed message be left? Yes    Message Turnaround: [place]:    Refer to site's KB page for routing instructions    Please give this turnaround time to the caller:   \"You can expect to receive a response 2-3 business days after your provider's clinical team reviews the message\"              
Left detailed message that mom will have to schedule an appointment for warts.     If mom calls back please place her in the schedule.   
[phone]

## 2023-09-26 NOTE — ASU PATIENT PROFILE, ADULT - NSSUBSTANCEUSE_GEN_ALL_CORE_SD
caffeine Area M Indication Text: Tumors in this location are included in Area M (cheek, forehead, scalp, neck, jawline and pretibial skin).  Mohs surgery is indicated for tumors in these anatomic locations.

## 2023-11-09 NOTE — ED PROVIDER NOTE - CROS ED CONS ALL NEG
Received incoming fax from [employer] for Cetirizine HCl 10 MG Cap    Message:  State does not cover the capsules, can we switch it to tablets?    Prescription pended. Pharmacy populated.    - - -

## 2023-11-16 NOTE — ED PROVIDER NOTE - NS HIV RISK FACTOR YES
Declined Siliq Pregnancy And Lactation Text: The risk during pregnancy and breastfeeding is uncertain with this medication.

## 2023-12-04 ENCOUNTER — NON-APPOINTMENT (OUTPATIENT)
Age: 33
End: 2023-12-04

## 2024-02-10 NOTE — REVIEW OF SYSTEMS
[Urine Infection (bladder/kidney)] : bladder/kidney infection [Told you have blood in urine on a urine test] : told blood was present in a urine test [Strain or push to urinate] : strain or push to urinate [Negative] : Heme/Lymph [see HPI] : see HPI 85 [FreeTextEntry6] : Reoccurring UTI / lump inside the vagina / burning during urination

## 2024-03-12 NOTE — ED ADULT NURSE NOTE - PERIPHERAL VASCULAR WDL
All discussed with the spouse Milli (EC)      ----- Message from Aries Vieyra NP sent at 3/12/2024 10:32 AM CDT -----  Past echocardiogram has mentioned enlargement to to the abdominal aorta at 3.2 cm.  This was re-evaluated with a dedicated abdominal ultrasound showing no evidence of aneurysm which is reassuring.   Pulses equal bilaterally, no edema present.

## 2024-03-21 NOTE — ASU PREOP CHECKLIST - NS PREOP CHK HIBICLENS NA
Dr. Obrien,    Patient is asking if her appt today at 10:30am can be a video visit.    Please advise, thank you.    #1:

## 2024-05-28 ENCOUNTER — EMERGENCY (EMERGENCY)
Facility: HOSPITAL | Age: 34
LOS: 1 days | Discharge: HOME | End: 2024-05-28
Attending: EMERGENCY MEDICINE | Admitting: EMERGENCY MEDICINE
Payer: MEDICAID

## 2024-05-28 VITALS
SYSTOLIC BLOOD PRESSURE: 135 MMHG | HEART RATE: 96 BPM | WEIGHT: 147.93 LBS | OXYGEN SATURATION: 98 % | RESPIRATION RATE: 16 BRPM | TEMPERATURE: 98 F | DIASTOLIC BLOOD PRESSURE: 82 MMHG

## 2024-05-28 VITALS
SYSTOLIC BLOOD PRESSURE: 116 MMHG | RESPIRATION RATE: 16 BRPM | TEMPERATURE: 98 F | DIASTOLIC BLOOD PRESSURE: 66 MMHG | OXYGEN SATURATION: 98 % | HEART RATE: 72 BPM

## 2024-05-28 DIAGNOSIS — Z87.42 PERSONAL HISTORY OF OTHER DISEASES OF THE FEMALE GENITAL TRACT: Chronic | ICD-10-CM

## 2024-05-28 DIAGNOSIS — Z98.89 OTHER SPECIFIED POSTPROCEDURAL STATES: Chronic | ICD-10-CM

## 2024-05-28 DIAGNOSIS — S62.309A UNSPECIFIED FRACTURE OF UNSPECIFIED METACARPAL BONE, INITIAL ENCOUNTER FOR CLOSED FRACTURE: Chronic | ICD-10-CM

## 2024-05-28 LAB
ALBUMIN SERPL ELPH-MCNC: 4.2 G/DL — SIGNIFICANT CHANGE UP (ref 3.3–5)
ALP SERPL-CCNC: 60 U/L — SIGNIFICANT CHANGE UP (ref 40–120)
ALT FLD-CCNC: 14 U/L — SIGNIFICANT CHANGE UP (ref 4–33)
ANION GAP SERPL CALC-SCNC: 11 MMOL/L — SIGNIFICANT CHANGE UP (ref 7–14)
APPEARANCE UR: CLEAR — SIGNIFICANT CHANGE UP
AST SERPL-CCNC: 13 U/L — SIGNIFICANT CHANGE UP (ref 4–32)
BACTERIA # UR AUTO: NEGATIVE /HPF — SIGNIFICANT CHANGE UP
BASOPHILS # BLD AUTO: 0.05 K/UL — SIGNIFICANT CHANGE UP (ref 0–0.2)
BASOPHILS NFR BLD AUTO: 0.8 % — SIGNIFICANT CHANGE UP (ref 0–2)
BILIRUB SERPL-MCNC: 0.3 MG/DL — SIGNIFICANT CHANGE UP (ref 0.2–1.2)
BILIRUB UR-MCNC: NEGATIVE — SIGNIFICANT CHANGE UP
BUN SERPL-MCNC: 12 MG/DL — SIGNIFICANT CHANGE UP (ref 7–23)
CALCIUM SERPL-MCNC: 9.4 MG/DL — SIGNIFICANT CHANGE UP (ref 8.4–10.5)
CAST: 0 /LPF — SIGNIFICANT CHANGE UP (ref 0–4)
CHLORIDE SERPL-SCNC: 104 MMOL/L — SIGNIFICANT CHANGE UP (ref 98–107)
CO2 SERPL-SCNC: 23 MMOL/L — SIGNIFICANT CHANGE UP (ref 22–31)
COLOR SPEC: YELLOW — SIGNIFICANT CHANGE UP
CREAT SERPL-MCNC: 0.64 MG/DL — SIGNIFICANT CHANGE UP (ref 0.5–1.3)
DIFF PNL FLD: ABNORMAL
EGFR: 120 ML/MIN/1.73M2 — SIGNIFICANT CHANGE UP
EOSINOPHIL # BLD AUTO: 0.21 K/UL — SIGNIFICANT CHANGE UP (ref 0–0.5)
EOSINOPHIL NFR BLD AUTO: 3.2 % — SIGNIFICANT CHANGE UP (ref 0–6)
GLUCOSE SERPL-MCNC: 115 MG/DL — HIGH (ref 70–99)
GLUCOSE UR QL: NEGATIVE MG/DL — SIGNIFICANT CHANGE UP
HCG SERPL-ACNC: <1 MIU/ML — SIGNIFICANT CHANGE UP
HCT VFR BLD CALC: 40.2 % — SIGNIFICANT CHANGE UP (ref 34.5–45)
HGB BLD-MCNC: 13.9 G/DL — SIGNIFICANT CHANGE UP (ref 11.5–15.5)
IANC: 4.24 K/UL — SIGNIFICANT CHANGE UP (ref 1.8–7.4)
IMM GRANULOCYTES NFR BLD AUTO: 0.3 % — SIGNIFICANT CHANGE UP (ref 0–0.9)
KETONES UR-MCNC: NEGATIVE MG/DL — SIGNIFICANT CHANGE UP
LEUKOCYTE ESTERASE UR-ACNC: ABNORMAL
LIDOCAIN IGE QN: 33 U/L — SIGNIFICANT CHANGE UP (ref 7–60)
LYMPHOCYTES # BLD AUTO: 1.58 K/UL — SIGNIFICANT CHANGE UP (ref 1–3.3)
LYMPHOCYTES # BLD AUTO: 24.3 % — SIGNIFICANT CHANGE UP (ref 13–44)
MCHC RBC-ENTMCNC: 32.5 PG — SIGNIFICANT CHANGE UP (ref 27–34)
MCHC RBC-ENTMCNC: 34.6 GM/DL — SIGNIFICANT CHANGE UP (ref 32–36)
MCV RBC AUTO: 93.9 FL — SIGNIFICANT CHANGE UP (ref 80–100)
MONOCYTES # BLD AUTO: 0.41 K/UL — SIGNIFICANT CHANGE UP (ref 0–0.9)
MONOCYTES NFR BLD AUTO: 6.3 % — SIGNIFICANT CHANGE UP (ref 2–14)
NEUTROPHILS # BLD AUTO: 4.24 K/UL — SIGNIFICANT CHANGE UP (ref 1.8–7.4)
NEUTROPHILS NFR BLD AUTO: 65.1 % — SIGNIFICANT CHANGE UP (ref 43–77)
NITRITE UR-MCNC: NEGATIVE — SIGNIFICANT CHANGE UP
NRBC # BLD: 0 /100 WBCS — SIGNIFICANT CHANGE UP (ref 0–0)
NRBC # FLD: 0 K/UL — SIGNIFICANT CHANGE UP (ref 0–0)
PH UR: 6.5 — SIGNIFICANT CHANGE UP (ref 5–8)
PLATELET # BLD AUTO: 293 K/UL — SIGNIFICANT CHANGE UP (ref 150–400)
POTASSIUM SERPL-MCNC: 4.2 MMOL/L — SIGNIFICANT CHANGE UP (ref 3.5–5.3)
POTASSIUM SERPL-SCNC: 4.2 MMOL/L — SIGNIFICANT CHANGE UP (ref 3.5–5.3)
PROT SERPL-MCNC: 8 G/DL — SIGNIFICANT CHANGE UP (ref 6–8.3)
PROT UR-MCNC: NEGATIVE MG/DL — SIGNIFICANT CHANGE UP
RBC # BLD: 4.28 M/UL — SIGNIFICANT CHANGE UP (ref 3.8–5.2)
RBC # FLD: 12.2 % — SIGNIFICANT CHANGE UP (ref 10.3–14.5)
RBC CASTS # UR COMP ASSIST: 2 /HPF — SIGNIFICANT CHANGE UP (ref 0–4)
SODIUM SERPL-SCNC: 138 MMOL/L — SIGNIFICANT CHANGE UP (ref 135–145)
SP GR SPEC: 1.01 — SIGNIFICANT CHANGE UP (ref 1–1.03)
SQUAMOUS # UR AUTO: 1 /HPF — SIGNIFICANT CHANGE UP (ref 0–5)
UROBILINOGEN FLD QL: 0.2 MG/DL — SIGNIFICANT CHANGE UP (ref 0.2–1)
WBC # BLD: 6.51 K/UL — SIGNIFICANT CHANGE UP (ref 3.8–10.5)
WBC # FLD AUTO: 6.51 K/UL — SIGNIFICANT CHANGE UP (ref 3.8–10.5)
WBC UR QL: 1 /HPF — SIGNIFICANT CHANGE UP (ref 0–5)

## 2024-05-28 PROCEDURE — 74177 CT ABD & PELVIS W/CONTRAST: CPT | Mod: 26,MC

## 2024-05-28 PROCEDURE — 99285 EMERGENCY DEPT VISIT HI MDM: CPT

## 2024-05-28 PROCEDURE — 93010 ELECTROCARDIOGRAM REPORT: CPT

## 2024-05-28 RX ORDER — ACETAMINOPHEN 500 MG
1000 TABLET ORAL ONCE
Refills: 0 | Status: COMPLETED | OUTPATIENT
Start: 2024-05-28 | End: 2024-05-28

## 2024-05-28 RX ORDER — ONDANSETRON 8 MG/1
4 TABLET, FILM COATED ORAL ONCE
Refills: 0 | Status: COMPLETED | OUTPATIENT
Start: 2024-05-28 | End: 2024-05-28

## 2024-05-28 RX ORDER — KETOROLAC TROMETHAMINE 30 MG/ML
15 SYRINGE (ML) INJECTION ONCE
Refills: 0 | Status: DISCONTINUED | OUTPATIENT
Start: 2024-05-28 | End: 2024-05-28

## 2024-05-28 RX ADMIN — Medication 15 MILLIGRAM(S): at 13:00

## 2024-05-28 RX ADMIN — Medication 1000 MILLIGRAM(S): at 11:30

## 2024-05-28 RX ADMIN — ONDANSETRON 4 MILLIGRAM(S): 8 TABLET, FILM COATED ORAL at 10:51

## 2024-05-28 RX ADMIN — ONDANSETRON 4 MILLIGRAM(S): 8 TABLET, FILM COATED ORAL at 13:00

## 2024-05-28 RX ADMIN — Medication 400 MILLIGRAM(S): at 10:51

## 2024-05-28 NOTE — ED PROVIDER NOTE - NSFOLLOWUPCLINICS_GEN_ALL_ED_FT
Gastroenterology at [facility]  Gastroenterology  [address] [place], [place] [address]  Phone: [phone]  Fax:   Follow Up Time: 1-3 Days

## 2024-05-28 NOTE — ED PROVIDER NOTE - NSFOLLOWUPINSTRUCTIONS_ED_ALL_ED_FT
It was a pleasure caring for you today!    You were seen in the ER today for abdominal pain.     Please follow up with your primary care doctor within 1 - 3 days. Call and let them know you were seen in the ER today.   Bring the results of your blood work and imaging with you to your appointment, if applicable.    For pain, please take acetaminophen 650 mg every 6 hours for pain. Additionally, you can also take ibuprofen 400 mg every 6-8 hours for pain.    Return to the ER for any worsening symptoms or concerns, including chest pain, shortness of breath, lightheadedness, weakness, or any other concerns. It was a pleasure caring for you today!    You were seen in the ER today for abdominal pain. Take pepcid and colace as directed.     Please follow up with gastroenterology and your primary care doctor within 1 - 3 days. Call and let them know you were seen in the ER today.   Bring the results of your blood work and imaging with you to your appointment, if applicable.    For pain, please take acetaminophen 650 mg every 6 hours for pain. Additionally, you can also take ibuprofen 400 mg every 6-8 hours for pain.    Return to the ER for any worsening symptoms or concerns, including chest pain, shortness of breath, lightheadedness, weakness, or any other concerns.

## 2024-05-28 NOTE — ED ADULT TRIAGE NOTE - CHIEF COMPLAINT QUOTE
pt ambulatory c/o 1 month worsening abdominal distention and pain, now experiencing nausea, diarrhea, urinary frequency. denies fevers. past medical history: PCOS, umbilical hernia, ovarian cysts, appendicitis, cholecystectomy

## 2024-05-28 NOTE — ED ADULT NURSE NOTE - OBJECTIVE STATEMENT
Pt A+Ox4.  Pt c/o abdominal distention for 1 month.  Pt reports pressure at epigastric region and pain to left lower abdomen.  Pt also states she feels pulling in right upper quadrant.  Pt reports difficulty controlling her bowel movements.  pt lungs clear, equal and unlabored, abdomen soft, skin intact.  IV placed right AC #20.

## 2024-05-28 NOTE — ED PROVIDER NOTE - OBJECTIVE STATEMENT
33-year-old female with history of PCOS, umbilical hernia s/p with care, ovarian cyst rupture, appendicitis, cholecystectomy presents to the ED with abdominal pain for the past 2 weeks.  Patient states that 1 month ago she noticed swelling in her abdomen followed by abdominal pain that started 2 weeks ago.  Patient initially thought it was due to a virus.  Patient endorses diarrhea since yesterday and 4 episodes of watery yellow frothy stools since this morning.  Patient also endorses malodorous urine patient also endorses nausea.  Patient also endorses incomplete bladder emptying and urgency.  No chest pain, SOB, fevers, chills, vomiting, constipation.

## 2024-05-28 NOTE — ED PROVIDER NOTE - CLINICAL SUMMARY MEDICAL DECISION MAKING FREE TEXT BOX
33-year-old female with history of PCOS, umbilical hernia s/p with care, ovarian cyst rupture, appendicitis, cholecystectomy presents to the ED with abdominal pain for the past 2 weeks.  Patient states that 1 month ago she noticed swelling in her abdomen followed by abdominal pain that started 2 weeks ago.  Patient initially thought it was due to a virus.  Patient endorses diarrhea since yesterday and 4 episodes of watery yellow frothy stools since this morning.  Patient also endorses malodorous urine patient also endorses nausea.  Patient also endorses incomplete bladder emptying and urgency.  No chest pain, SOB, fevers, chills, vomiting, constipation. Patient afebrile and hemodynamically stable.  DDx includes pregnancy, gastroenteritis, GERD.

## 2024-05-28 NOTE — ED PROVIDER NOTE - ATTENDING CONTRIBUTION TO CARE
Dr. Gracia: 34 yo female with history of PCOS, umbilical hernia, s/p ovarian cyst rupture, s/p appendectomy and cholecystectomy, in ED with 2 weeks of worsening generalized abdominal pain, accompanied now by yellow diarrhea but no N/V, as well as pt having urinary urgency.  On exam pt overall well appearing, in NAD, heart RRR, lungs CTAB, abd with general TTP throughout but no rebound or guarding, extremities without swelling, strength 5/5 in all extremities and skin without rash.    I, Tai Gracia MD, personally made/approved the management plan for this patient and take responsibility for the patient's management.

## 2024-05-28 NOTE — ED ADULT NURSE NOTE - PAIN RATING/NUMBER SCALE (0-10): ACTIVITY
Add Ability To Document Additional Intralesional Injection: No Pre-Procedure: The surgical site was antiseptically prepared. Medication Injected: 5-Fluorouracil Concentration (Mg/Ml Or Millions Of Plaque Forming Units/Cc): 0.01 Total Time In Minutes: 3 minutes Bill As A Line Item Or As Units: Line Item Post-Care Instructions: I reviewed with the patient in detail post-care instructions. Patient is to keep the area dry for 48 hours, and not to engage in any heavy lifting, exercise, or swimming for the next 14 days. Should the patient develop any fevers, chills, bleeding, severe pain patient will contact the office immediately. 6 (moderate pain) Size Of Lesion In Cm: 1.2 Number Of Freeze-Thaw Cycles: 2 freeze-thaw cycles Total Volume (Ccs): 1 Body Location Override (Optional - Billing Will Still Be Based On Selected Body Map Location If Applicable): Lt deltoid Anesthesia Volume In Cc: 0 Detail Level: Detailed Consent was obtained from the patient. The risks and benefits to therapy were discussed in detail. Specifically, the risks of infection, scarring, bleeding, prolonged wound healing, incomplete removal, allergy to anesthesia, nerve injury and recurrence were addressed. Alternatives to liquid nitrogen, such as ED&C, surgical removal, XRT were also discussed.  Prior to the procedure, the treatment site was clearly identified and confirmed by the patient. All components of Universal Protocol/PAUSE Rule completed.

## 2024-05-28 NOTE — ED PROVIDER NOTE - PROGRESS NOTE DETAILS
Rachelle Jimenes MD (PGY1) Pt reassessed at bedside and states she is feeling better. Pt states she felt better after the pepcid.

## 2024-05-28 NOTE — ED PROVIDER NOTE - PHYSICAL EXAMINATION
Const: Awake, alert, no acute distress.  Well appearing.  Moving comfortably on stretcher.  Cardiac: Regular rate and regular rhythm. S1 S2 present.  Peripheral pulses 2+ and symmetric. No LE edema.  Resp: Speaking in full sentences. No evidence of respiratory distress.  Breath sounds clear to auscultation b/l. Normal chest excursion.   Abd: LLQ and epigastric ttp and suprapubic ttp. Non-distended, no overlying skin changes.  Soft, no guarding, no rigidity, no rebound tenderness.  No palpable masses.  Normal bowel sounds in all 4 quadrants.  Back: No CVAT.  Skin: Normal coloration.  No rashes, abrasions or lacerations.  Neuro: Awake, alert & oriented x 3.  Moves all extremities spontaneously.  No focal deficits.

## 2024-05-28 NOTE — ED ADULT NURSE NOTE - NSFALLUNIVINTERV_ED_ALL_ED
Bed/Stretcher in lowest position, wheels locked, appropriate side rails in place/Call bell, personal items and telephone in reach/Instruct patient to call for assistance before getting out of bed/chair/stretcher/Non-slip footwear applied when patient is off stretcher/[place] to call system/Physically safe environment - no spills, clutter or unnecessary equipment/Purposeful proactive rounding/Room/bathroom lighting operational, light cord in reach

## 2024-05-28 NOTE — ED PROVIDER NOTE - PATIENT PORTAL LINK FT
You can access the FollowMyHealth Patient Portal offered by [facility] by registering at the following website: [email or web address]. By joining [employer]’s FollowMyHealth portal, you will also be able to view your health information using other applications (apps) compatible with our system.

## 2024-05-29 LAB
CULTURE RESULTS: SIGNIFICANT CHANGE UP
SPECIMEN SOURCE: SIGNIFICANT CHANGE UP

## 2024-07-02 ENCOUNTER — APPOINTMENT (OUTPATIENT)
Dept: GASTROENTEROLOGY | Facility: CLINIC | Age: 34
End: 2024-07-02
Payer: MEDICAID

## 2024-07-02 VITALS
DIASTOLIC BLOOD PRESSURE: 84 MMHG | BODY MASS INDEX: 32.86 KG/M2 | TEMPERATURE: 97.8 F | WEIGHT: 163 LBS | OXYGEN SATURATION: 98 % | HEIGHT: 59 IN | SYSTOLIC BLOOD PRESSURE: 131 MMHG | HEART RATE: 97 BPM

## 2024-07-02 PROCEDURE — 99204 OFFICE O/P NEW MOD 45 MIN: CPT

## 2024-07-02 RX ORDER — PANTOPRAZOLE 40 MG/1
40 TABLET, DELAYED RELEASE ORAL
Qty: 30 | Refills: 1 | Status: ACTIVE | COMMUNITY
Start: 2024-07-02 | End: 1900-01-01

## 2024-07-04 ENCOUNTER — EMERGENCY (EMERGENCY)
Facility: HOSPITAL | Age: 34
LOS: 1 days | Discharge: HOME | End: 2024-07-04
Attending: EMERGENCY MEDICINE | Admitting: EMERGENCY MEDICINE
Payer: MEDICAID

## 2024-07-04 VITALS
WEIGHT: 162.92 LBS | TEMPERATURE: 98 F | RESPIRATION RATE: 18 BRPM | HEIGHT: 59 IN | SYSTOLIC BLOOD PRESSURE: 108 MMHG | DIASTOLIC BLOOD PRESSURE: 74 MMHG | HEART RATE: 78 BPM | OXYGEN SATURATION: 98 %

## 2024-07-04 VITALS
RESPIRATION RATE: 15 BRPM | DIASTOLIC BLOOD PRESSURE: 63 MMHG | TEMPERATURE: 98 F | SYSTOLIC BLOOD PRESSURE: 107 MMHG | HEART RATE: 73 BPM | OXYGEN SATURATION: 100 %

## 2024-07-04 DIAGNOSIS — S62.309A UNSPECIFIED FRACTURE OF UNSPECIFIED METACARPAL BONE, INITIAL ENCOUNTER FOR CLOSED FRACTURE: Chronic | ICD-10-CM

## 2024-07-04 DIAGNOSIS — Z98.89 OTHER SPECIFIED POSTPROCEDURAL STATES: Chronic | ICD-10-CM

## 2024-07-04 DIAGNOSIS — Z87.42 PERSONAL HISTORY OF OTHER DISEASES OF THE FEMALE GENITAL TRACT: Chronic | ICD-10-CM

## 2024-07-04 LAB
ALBUMIN SERPL ELPH-MCNC: 4 G/DL — SIGNIFICANT CHANGE UP (ref 3.3–5)
ALP SERPL-CCNC: 58 U/L — SIGNIFICANT CHANGE UP (ref 40–120)
ALT FLD-CCNC: 11 U/L — SIGNIFICANT CHANGE UP (ref 4–33)
ANION GAP SERPL CALC-SCNC: 12 MMOL/L — SIGNIFICANT CHANGE UP (ref 7–14)
APPEARANCE UR: CLEAR — SIGNIFICANT CHANGE UP
AST SERPL-CCNC: 11 U/L — SIGNIFICANT CHANGE UP (ref 4–32)
BACTERIA # UR AUTO: NEGATIVE /HPF — SIGNIFICANT CHANGE UP
BASOPHILS # BLD AUTO: 0.05 K/UL — SIGNIFICANT CHANGE UP (ref 0–0.2)
BASOPHILS NFR BLD AUTO: 0.7 % — SIGNIFICANT CHANGE UP (ref 0–2)
BILIRUB SERPL-MCNC: 0.5 MG/DL — SIGNIFICANT CHANGE UP (ref 0.2–1.2)
BILIRUB UR-MCNC: NEGATIVE — SIGNIFICANT CHANGE UP
BUN SERPL-MCNC: 14 MG/DL — SIGNIFICANT CHANGE UP (ref 7–23)
CALCIUM SERPL-MCNC: 8.8 MG/DL — SIGNIFICANT CHANGE UP (ref 8.4–10.5)
CAST: 0 /LPF — SIGNIFICANT CHANGE UP (ref 0–4)
CHLORIDE SERPL-SCNC: 104 MMOL/L — SIGNIFICANT CHANGE UP (ref 98–107)
CO2 SERPL-SCNC: 22 MMOL/L — SIGNIFICANT CHANGE UP (ref 22–31)
COLOR SPEC: YELLOW — SIGNIFICANT CHANGE UP
CREAT SERPL-MCNC: 0.65 MG/DL — SIGNIFICANT CHANGE UP (ref 0.5–1.3)
DIFF PNL FLD: ABNORMAL
EGFR: 120 ML/MIN/1.73M2 — SIGNIFICANT CHANGE UP
EGFR: 120 ML/MIN/1.73M2 — SIGNIFICANT CHANGE UP
EOSINOPHIL # BLD AUTO: 0.18 K/UL — SIGNIFICANT CHANGE UP (ref 0–0.5)
EOSINOPHIL NFR BLD AUTO: 2.6 % — SIGNIFICANT CHANGE UP (ref 0–6)
GLUCOSE SERPL-MCNC: 101 MG/DL — HIGH (ref 70–99)
GLUCOSE UR QL: NEGATIVE MG/DL — SIGNIFICANT CHANGE UP
HCG SERPL-ACNC: <1 MIU/ML — SIGNIFICANT CHANGE UP
HCT VFR BLD CALC: 38.8 % — SIGNIFICANT CHANGE UP (ref 34.5–45)
HGB BLD-MCNC: 13 G/DL — SIGNIFICANT CHANGE UP (ref 11.5–15.5)
IANC: 4.6 K/UL — SIGNIFICANT CHANGE UP (ref 1.8–7.4)
IMM GRANULOCYTES NFR BLD AUTO: 0.1 % — SIGNIFICANT CHANGE UP (ref 0–0.9)
KETONES UR-MCNC: NEGATIVE MG/DL — SIGNIFICANT CHANGE UP
LEUKOCYTE ESTERASE UR-ACNC: NEGATIVE — SIGNIFICANT CHANGE UP
LIDOCAIN IGE QN: 35 U/L — SIGNIFICANT CHANGE UP (ref 7–60)
LYMPHOCYTES # BLD AUTO: 1.76 K/UL — SIGNIFICANT CHANGE UP (ref 1–3.3)
LYMPHOCYTES # BLD AUTO: 25.1 % — SIGNIFICANT CHANGE UP (ref 13–44)
MCHC RBC-ENTMCNC: 32.1 PG — SIGNIFICANT CHANGE UP (ref 27–34)
MCHC RBC-ENTMCNC: 33.5 GM/DL — SIGNIFICANT CHANGE UP (ref 32–36)
MCV RBC AUTO: 95.8 FL — SIGNIFICANT CHANGE UP (ref 80–100)
MONOCYTES # BLD AUTO: 0.42 K/UL — SIGNIFICANT CHANGE UP (ref 0–0.9)
MONOCYTES NFR BLD AUTO: 6 % — SIGNIFICANT CHANGE UP (ref 2–14)
NEUTROPHILS # BLD AUTO: 4.6 K/UL — SIGNIFICANT CHANGE UP (ref 1.8–7.4)
NEUTROPHILS NFR BLD AUTO: 65.5 % — SIGNIFICANT CHANGE UP (ref 43–77)
NITRITE UR-MCNC: NEGATIVE — SIGNIFICANT CHANGE UP
NRBC # BLD AUTO: 0 K/UL — SIGNIFICANT CHANGE UP (ref 0–0)
NRBC # BLD: 0 /100 WBCS — SIGNIFICANT CHANGE UP (ref 0–0)
NRBC # FLD: 0 K/UL — SIGNIFICANT CHANGE UP (ref 0–0)
NRBC BLD-RTO: 0 /100 WBCS — SIGNIFICANT CHANGE UP (ref 0–0)
PH UR: 5.5 — SIGNIFICANT CHANGE UP (ref 5–8)
PLATELET # BLD AUTO: 317 K/UL — SIGNIFICANT CHANGE UP (ref 150–400)
POTASSIUM SERPL-MCNC: 4.2 MMOL/L — SIGNIFICANT CHANGE UP (ref 3.5–5.3)
POTASSIUM SERPL-SCNC: 4.2 MMOL/L — SIGNIFICANT CHANGE UP (ref 3.5–5.3)
PROT SERPL-MCNC: 7.4 G/DL — SIGNIFICANT CHANGE UP (ref 6–8.3)
PROT UR-MCNC: NEGATIVE MG/DL — SIGNIFICANT CHANGE UP
RBC # BLD: 4.05 M/UL — SIGNIFICANT CHANGE UP (ref 3.8–5.2)
RBC # FLD: 11.5 % — SIGNIFICANT CHANGE UP (ref 10.3–14.5)
RBC CASTS # UR COMP ASSIST: 4 /HPF — SIGNIFICANT CHANGE UP (ref 0–4)
SODIUM SERPL-SCNC: 138 MMOL/L — SIGNIFICANT CHANGE UP (ref 135–145)
SP GR SPEC: 1.02 — SIGNIFICANT CHANGE UP (ref 1–1.03)
SQUAMOUS # UR AUTO: 5 /HPF — SIGNIFICANT CHANGE UP (ref 0–5)
UROBILINOGEN FLD QL: 0.2 MG/DL — SIGNIFICANT CHANGE UP (ref 0.2–1)
WBC # BLD: 7.02 K/UL — SIGNIFICANT CHANGE UP (ref 3.8–10.5)
WBC # FLD AUTO: 7.02 K/UL — SIGNIFICANT CHANGE UP (ref 3.8–10.5)
WBC UR QL: 2 /HPF — SIGNIFICANT CHANGE UP (ref 0–5)

## 2024-07-04 PROCEDURE — 74177 CT ABD & PELVIS W/CONTRAST: CPT | Mod: 26,MC

## 2024-07-04 PROCEDURE — 76830 TRANSVAGINAL US NON-OB: CPT | Mod: 26

## 2024-07-04 PROCEDURE — 99285 EMERGENCY DEPT VISIT HI MDM: CPT

## 2024-07-04 RX ORDER — ACETAMINOPHEN 500 MG/5ML
1000 LIQUID (ML) ORAL ONCE
Refills: 0 | Status: COMPLETED | OUTPATIENT
Start: 2024-07-04 | End: 2024-07-04

## 2024-07-04 RX ORDER — KETOROLAC TROMETHAMINE 30 MG/ML
15 INJECTION, SOLUTION INTRAMUSCULAR; INTRAVENOUS ONCE
Refills: 0 | Status: DISCONTINUED | OUTPATIENT
Start: 2024-07-04 | End: 2024-07-04

## 2024-07-04 RX ORDER — METOCLOPRAMIDE HCL 10 MG
10 TABLET ORAL ONCE
Refills: 0 | Status: DISCONTINUED | OUTPATIENT
Start: 2024-07-04 | End: 2024-07-04

## 2024-07-04 RX ORDER — ONDANSETRON HCL/PF 4 MG/2 ML
4 VIAL (ML) INJECTION ONCE
Refills: 0 | Status: COMPLETED | OUTPATIENT
Start: 2024-07-04 | End: 2024-07-04

## 2024-07-04 RX ADMIN — Medication 400 MILLIGRAM(S): at 12:27

## 2024-07-04 RX ADMIN — Medication 1000 MILLILITER(S): at 12:28

## 2024-07-04 RX ADMIN — KETOROLAC TROMETHAMINE 15 MILLIGRAM(S): 30 INJECTION, SOLUTION INTRAMUSCULAR; INTRAVENOUS at 09:50

## 2024-07-04 RX ADMIN — Medication 1000 MILLILITER(S): at 09:51

## 2024-07-04 RX ADMIN — Medication 4 MILLIGRAM(S): at 09:50

## 2024-07-08 LAB
-  AMPICILLIN: SIGNIFICANT CHANGE UP
-  CIPROFLOXACIN: SIGNIFICANT CHANGE UP
-  LEVOFLOXACIN: SIGNIFICANT CHANGE UP
-  NITROFURANTOIN: SIGNIFICANT CHANGE UP
-  TETRACYCLINE: SIGNIFICANT CHANGE UP
-  VANCOMYCIN: SIGNIFICANT CHANGE UP
CULTURE RESULTS: ABNORMAL
METHOD TYPE: SIGNIFICANT CHANGE UP
ORGANISM # SPEC MICROSCOPIC CNT: ABNORMAL
ORGANISM # SPEC MICROSCOPIC CNT: ABNORMAL
SPECIMEN SOURCE: SIGNIFICANT CHANGE UP

## 2024-07-15 ENCOUNTER — NON-APPOINTMENT (OUTPATIENT)
Age: 34
End: 2024-07-15

## 2024-07-15 ENCOUNTER — LABORATORY RESULT (OUTPATIENT)
Age: 34
End: 2024-07-15

## 2024-07-15 ENCOUNTER — APPOINTMENT (OUTPATIENT)
Dept: INTERNAL MEDICINE | Facility: CLINIC | Age: 34
End: 2024-07-15
Payer: MEDICAID

## 2024-07-15 VITALS
HEIGHT: 59 IN | HEART RATE: 81 BPM | DIASTOLIC BLOOD PRESSURE: 86 MMHG | TEMPERATURE: 98.5 F | BODY MASS INDEX: 33.06 KG/M2 | SYSTOLIC BLOOD PRESSURE: 120 MMHG | WEIGHT: 164 LBS | OXYGEN SATURATION: 99 %

## 2024-07-15 DIAGNOSIS — R10.9 UNSPECIFIED ABDOMINAL PAIN: ICD-10-CM

## 2024-07-15 DIAGNOSIS — Z00.00 ENCOUNTER FOR GENERAL ADULT MEDICAL EXAMINATION W/OUT ABNORMAL FINDINGS: ICD-10-CM

## 2024-07-15 PROCEDURE — 99385 PREV VISIT NEW AGE 18-39: CPT

## 2024-07-22 LAB
25(OH)D3 SERPL-MCNC: 23.5 NG/ML
ALBUMIN SERPL ELPH-MCNC: 4.6 G/DL
ALP BLD-CCNC: 58 U/L
ALT SERPL-CCNC: 13 U/L
ANION GAP SERPL CALC-SCNC: 14 MMOL/L
APPEARANCE: CLEAR
AST SERPL-CCNC: 10 U/L
BASOPHILS # BLD AUTO: 0.05 K/UL
BASOPHILS NFR BLD AUTO: 0.6 %
BILIRUB SERPL-MCNC: 0.3 MG/DL
BILIRUBIN URINE: NEGATIVE
BLOOD URINE: ABNORMAL
BUN SERPL-MCNC: 15 MG/DL
CALCIUM SERPL-MCNC: 9 MG/DL
CELIACPAN: NORMAL
CHLORIDE SERPL-SCNC: 104 MMOL/L
CHOLEST SERPL-MCNC: 228 MG/DL
CO2 SERPL-SCNC: 23 MMOL/L
COLOR: YELLOW
CREAT SERPL-MCNC: 0.73 MG/DL
EGFR: 112 ML/MIN/1.73M2
EOSINOPHIL # BLD AUTO: 0.15 K/UL
EOSINOPHIL NFR BLD AUTO: 1.9 %
ESTIMATED AVERAGE GLUCOSE: 105 MG/DL
FSH SERPL-MCNC: 3.9 IU/L
GLUCOSE QUALITATIVE U: NEGATIVE MG/DL
GLUCOSE SERPL-MCNC: 104 MG/DL
HBA1C MFR BLD HPLC: 5.3 %
HCT VFR BLD CALC: 40.7 %
HDLC SERPL-MCNC: 54 MG/DL
HGB BLD-MCNC: 13.5 G/DL
IMM GRANULOCYTES NFR BLD AUTO: 0.3 %
KETONES URINE: NEGATIVE MG/DL
LDLC SERPL CALC-MCNC: 127 MG/DL
LEUKOCYTE ESTERASE URINE: ABNORMAL
LH SERPL-ACNC: 9.9 IU/L
LYMPHOCYTES # BLD AUTO: 1.86 K/UL
LYMPHOCYTES NFR BLD AUTO: 23.4 %
MAGNESIUM SERPL-MCNC: 2 MG/DL
MAN DIFF?: NORMAL
MCHC RBC-ENTMCNC: 32.7 PG
MCHC RBC-ENTMCNC: 33.2 GM/DL
MCV RBC AUTO: 98.5 FL
MONOCYTES # BLD AUTO: 0.58 K/UL
MONOCYTES NFR BLD AUTO: 7.3 %
NEUTROPHILS # BLD AUTO: 5.28 K/UL
NEUTROPHILS NFR BLD AUTO: 66.5 %
NITRITE URINE: NEGATIVE
NONHDLC SERPL-MCNC: 174 MG/DL
PH URINE: 5.5
PHOSPHATE SERPL-MCNC: 3.2 MG/DL
PLATELET # BLD AUTO: 319 K/UL
POTASSIUM SERPL-SCNC: 4 MMOL/L
PROT SERPL-MCNC: 7.8 G/DL
PROTEIN URINE: NORMAL MG/DL
RBC # BLD: 4.13 M/UL
RBC # FLD: 12.2 %
SODIUM SERPL-SCNC: 141 MMOL/L
SPECIFIC GRAVITY URINE: >1.03
T3FREE SERPL-MCNC: 3.05 PG/ML
T4 FREE SERPL-MCNC: 1.2 NG/DL
TESTOST FREE SERPL-MCNC: 0 PG/ML
TESTOST SERPL-MCNC: 27.3 NG/DL
TRIGL SERPL-MCNC: 265 MG/DL
TSH SERPL-ACNC: 0.97 UIU/ML
UREA BREATH TEST QL: NEGATIVE
UROBILINOGEN URINE: 1 MG/DL
VIT B12 SERPL-MCNC: 462 PG/ML
WBC # FLD AUTO: 7.94 K/UL

## 2024-07-22 RX ORDER — AMOXICILLIN AND CLAVULANATE POTASSIUM 875; 125 MG/1; MG/1
875-125 TABLET, COATED ORAL
Qty: 14 | Refills: 0 | Status: ACTIVE | COMMUNITY
Start: 2024-07-22 | End: 1900-01-01

## 2024-07-24 LAB — DHEA-SULFATE, SERUM: 205 UG/DL

## 2024-08-08 NOTE — PATIENT PROFILE OB - BREAST MILK PROVIDES COLOSTRUM THAT IS HIGH IN PROTEIN
Patient tolerated treatment today. Next appointment 9/13 1500, she declined AVS   Statement Selected

## 2024-08-11 ENCOUNTER — NON-APPOINTMENT (OUTPATIENT)
Age: 34
End: 2024-08-11

## 2024-09-04 ENCOUNTER — TRANSCRIPTION ENCOUNTER (OUTPATIENT)
Age: 34
End: 2024-09-04

## 2024-09-04 ENCOUNTER — RESULT REVIEW (OUTPATIENT)
Age: 34
End: 2024-09-04

## 2024-09-04 ENCOUNTER — OUTPATIENT (OUTPATIENT)
Dept: OUTPATIENT SERVICES | Facility: HOSPITAL | Age: 34
LOS: 1 days | End: 2024-09-04
Payer: MEDICAID

## 2024-09-04 ENCOUNTER — APPOINTMENT (OUTPATIENT)
Dept: GASTROENTEROLOGY | Facility: HOSPITAL | Age: 34
End: 2024-09-04

## 2024-09-04 VITALS
RESPIRATION RATE: 23 BRPM | SYSTOLIC BLOOD PRESSURE: 112 MMHG | OXYGEN SATURATION: 99 % | DIASTOLIC BLOOD PRESSURE: 71 MMHG | HEART RATE: 90 BPM

## 2024-09-04 VITALS
HEART RATE: 86 BPM | RESPIRATION RATE: 14 BRPM | OXYGEN SATURATION: 96 % | WEIGHT: 164.91 LBS | SYSTOLIC BLOOD PRESSURE: 125 MMHG | HEIGHT: 59 IN | DIASTOLIC BLOOD PRESSURE: 76 MMHG | TEMPERATURE: 98 F

## 2024-09-04 DIAGNOSIS — S62.309A UNSPECIFIED FRACTURE OF UNSPECIFIED METACARPAL BONE, INITIAL ENCOUNTER FOR CLOSED FRACTURE: Chronic | ICD-10-CM

## 2024-09-04 DIAGNOSIS — K21.9 GASTRO-ESOPHAGEAL REFLUX DISEASE WITHOUT ESOPHAGITIS: ICD-10-CM

## 2024-09-04 DIAGNOSIS — Z98.89 OTHER SPECIFIED POSTPROCEDURAL STATES: Chronic | ICD-10-CM

## 2024-09-04 DIAGNOSIS — Z87.42 PERSONAL HISTORY OF OTHER DISEASES OF THE FEMALE GENITAL TRACT: Chronic | ICD-10-CM

## 2024-09-04 PROCEDURE — 43259 EGD US EXAM DUODENUM/JEJUNUM: CPT

## 2024-09-04 PROCEDURE — 88305 TISSUE EXAM BY PATHOLOGIST: CPT

## 2024-09-04 PROCEDURE — 43239 EGD BIOPSY SINGLE/MULTIPLE: CPT

## 2024-09-04 PROCEDURE — 88305 TISSUE EXAM BY PATHOLOGIST: CPT | Mod: 26

## 2024-09-04 RX ORDER — SODIUM CHLORIDE 9 MG/ML
500 INJECTION INTRAMUSCULAR; INTRAVENOUS; SUBCUTANEOUS
Refills: 0 | Status: DISCONTINUED | OUTPATIENT
Start: 2024-09-04 | End: 2024-09-18

## 2024-09-04 RX ORDER — SODIUM CHLORIDE 9 MG/ML
500 INJECTION INTRAMUSCULAR; INTRAVENOUS; SUBCUTANEOUS
Refills: 0 | Status: COMPLETED | OUTPATIENT
Start: 2024-09-04 | End: 2024-09-04

## 2024-09-04 RX ADMIN — SODIUM CHLORIDE 30 MILLILITER(S): 9 INJECTION INTRAMUSCULAR; INTRAVENOUS; SUBCUTANEOUS at 11:02

## 2024-09-04 NOTE — PRE-ANESTHESIA EVALUATION ADULT - NSANTHDISPORD_GEN_ALL_CORE
Patient made aware of message. Patient states that he took 1 of the BP medication so far today at 11 AM.  Right now his BP reads 182/99, pulse 87. He planned to take 1 more with his eliquis this afternoon. Should he take them together or should he keep them separate? Yesterday when he felt like that, he took them seperately. PACU

## 2024-09-04 NOTE — PRE PROCEDURE NOTE - PRE PROCEDURE EVALUATION
Attending Physician:  Dr. Scott                Procedure: EGD EUS    Indication for Procedure: abd pain abn CT  ________________________________________________________  PAST MEDICAL & SURGICAL HISTORY:  Endometriosis      Asthma      History of PCOS      Umbilical hernia      Ovarian cyst      Appendicitis      Obesity      S/P Appendectomy      H/O hernia repair  umbilical hernia repair 2011 X 3      History of ovarian cyst  rupture- 2011. pt states she had internal bleeding and needed a transfusion.      Fracture, metacarpal  s/p repair with metal plate        ALLERGIES:  Reglan (Rash)  Nuts (Anaphylaxis)  fruits (apples, pineapples, pears, cherries,  kiwi)---mouth edema and &quot;itchy throat&quot; (Other)  reglan--pruritic rash (Other)  nuts--mouth edema and &quot;itchy throat&quot; (Other)  Reglan (Hives)    HOME MEDICATIONS:  metFORMIN 500 mg oral tablet: 1 tab(s) orally once a day  Multiple Vitamins oral capsule: 1 cap(s) orally once a day  spironolactone 25 mg oral tablet: 1 tab(s) orally once a day  zafemy birth control pills daily:     AICD/PPM: [ ] yes   [x ] no    PERTINENT LAB DATA:                      PHYSICAL EXAMINATION:    Height (cm): 149.9  Weight (kg): 74.8  BMI (kg/m2): 33.3  BSA (m2): 1.7T(C): 36.8  HR: 86  BP: 125/76  RR: 14  SpO2: 96%    Constitutional: NAD  HEENT: PERRLA, EOMI,    Neck:  No JVD  Respiratory: CTAB/L  Cardiovascular: S1 and S2  Gastrointestinal: BS+, soft, NT/ND  Extremities: No peripheral edema  Neurological: A/O x 3, no focal deficits  Psychiatric: Normal mood, normal affect  Skin: No rashes    ASA Class: I [ x]  II [ ]  III [ ]  IV [ ]    COMMENTS:    The patient is a suitable candidate for the planned procedure unless box checked [ ]  No, explain:

## 2024-09-05 LAB — SURGICAL PATHOLOGY STUDY: SIGNIFICANT CHANGE UP

## 2024-09-17 RX ORDER — FAMOTIDINE 20 MG/1
20 TABLET, FILM COATED ORAL
Qty: 15 | Refills: 1 | Status: ACTIVE | COMMUNITY
Start: 2024-09-17 | End: 1900-01-01

## 2024-09-27 ENCOUNTER — NON-APPOINTMENT (OUTPATIENT)
Age: 34
End: 2024-09-27

## 2024-10-21 ENCOUNTER — APPOINTMENT (OUTPATIENT)
Dept: UROLOGY | Facility: CLINIC | Age: 34
End: 2024-10-21
Payer: MEDICAID

## 2024-10-21 ENCOUNTER — NON-APPOINTMENT (OUTPATIENT)
Age: 34
End: 2024-10-21

## 2024-10-21 VITALS
OXYGEN SATURATION: 99 % | DIASTOLIC BLOOD PRESSURE: 71 MMHG | HEART RATE: 102 BPM | SYSTOLIC BLOOD PRESSURE: 111 MMHG | RESPIRATION RATE: 17 BRPM | TEMPERATURE: 98 F

## 2024-10-21 DIAGNOSIS — N39.0 URINARY TRACT INFECTION, SITE NOT SPECIFIED: ICD-10-CM

## 2024-10-21 DIAGNOSIS — Z87.448 PERSONAL HISTORY OF OTHER DISEASES OF URINARY SYSTEM: ICD-10-CM

## 2024-10-21 DIAGNOSIS — N36.1 URETHRAL DIVERTICULUM: ICD-10-CM

## 2024-10-21 PROCEDURE — G2211 COMPLEX E/M VISIT ADD ON: CPT | Mod: NC

## 2024-10-21 PROCEDURE — 99203 OFFICE O/P NEW LOW 30 MIN: CPT

## 2024-11-09 ENCOUNTER — APPOINTMENT (OUTPATIENT)
Dept: MRI IMAGING | Facility: IMAGING CENTER | Age: 34
End: 2024-11-09

## 2024-11-09 ENCOUNTER — OUTPATIENT (OUTPATIENT)
Dept: OUTPATIENT SERVICES | Facility: HOSPITAL | Age: 34
LOS: 1 days | End: 2024-11-09
Payer: COMMERCIAL

## 2024-11-09 DIAGNOSIS — Z98.89 OTHER SPECIFIED POSTPROCEDURAL STATES: Chronic | ICD-10-CM

## 2024-11-09 DIAGNOSIS — N36.1 URETHRAL DIVERTICULUM: ICD-10-CM

## 2024-11-09 DIAGNOSIS — S62.309A UNSPECIFIED FRACTURE OF UNSPECIFIED METACARPAL BONE, INITIAL ENCOUNTER FOR CLOSED FRACTURE: Chronic | ICD-10-CM

## 2024-11-09 DIAGNOSIS — Z87.42 PERSONAL HISTORY OF OTHER DISEASES OF THE FEMALE GENITAL TRACT: Chronic | ICD-10-CM

## 2024-11-09 PROCEDURE — 72197 MRI PELVIS W/O & W/DYE: CPT | Mod: 26

## 2024-11-09 PROCEDURE — 72197 MRI PELVIS W/O & W/DYE: CPT

## 2024-11-09 PROCEDURE — A9585: CPT

## 2024-11-10 ENCOUNTER — NON-APPOINTMENT (OUTPATIENT)
Age: 34
End: 2024-11-10

## 2024-12-09 ENCOUNTER — APPOINTMENT (OUTPATIENT)
Dept: UROLOGY | Facility: CLINIC | Age: 34
End: 2024-12-09

## 2024-12-10 ENCOUNTER — OUTPATIENT (OUTPATIENT)
Dept: OUTPATIENT SERVICES | Facility: HOSPITAL | Age: 34
LOS: 1 days | End: 2024-12-10
Payer: COMMERCIAL

## 2024-12-10 ENCOUNTER — APPOINTMENT (OUTPATIENT)
Dept: UROLOGY | Facility: CLINIC | Age: 34
End: 2024-12-10
Payer: COMMERCIAL

## 2024-12-10 VITALS
WEIGHT: 171.52 LBS | HEIGHT: 59 IN | HEART RATE: 76 BPM | SYSTOLIC BLOOD PRESSURE: 117 MMHG | TEMPERATURE: 99 F | RESPIRATION RATE: 14 BRPM | DIASTOLIC BLOOD PRESSURE: 81 MMHG | OXYGEN SATURATION: 97 %

## 2024-12-10 DIAGNOSIS — N36.1 URETHRAL DIVERTICULUM: ICD-10-CM

## 2024-12-10 DIAGNOSIS — Z98.89 OTHER SPECIFIED POSTPROCEDURAL STATES: Chronic | ICD-10-CM

## 2024-12-10 DIAGNOSIS — R35.0 FREQUENCY OF MICTURITION: ICD-10-CM

## 2024-12-10 DIAGNOSIS — Z98.890 OTHER SPECIFIED POSTPROCEDURAL STATES: Chronic | ICD-10-CM

## 2024-12-10 DIAGNOSIS — S62.309A UNSPECIFIED FRACTURE OF UNSPECIFIED METACARPAL BONE, INITIAL ENCOUNTER FOR CLOSED FRACTURE: Chronic | ICD-10-CM

## 2024-12-10 DIAGNOSIS — Z01.818 ENCOUNTER FOR OTHER PREPROCEDURAL EXAMINATION: ICD-10-CM

## 2024-12-10 DIAGNOSIS — Z87.42 PERSONAL HISTORY OF OTHER DISEASES OF THE FEMALE GENITAL TRACT: Chronic | ICD-10-CM

## 2024-12-10 LAB
ANION GAP SERPL CALC-SCNC: 13 MMOL/L — SIGNIFICANT CHANGE UP (ref 5–17)
BLD GP AB SCN SERPL QL: NEGATIVE — SIGNIFICANT CHANGE UP
BUN SERPL-MCNC: 14 MG/DL — SIGNIFICANT CHANGE UP (ref 7–23)
CALCIUM SERPL-MCNC: 9.3 MG/DL — SIGNIFICANT CHANGE UP (ref 8.4–10.5)
CHLORIDE SERPL-SCNC: 103 MMOL/L — SIGNIFICANT CHANGE UP (ref 96–108)
CO2 SERPL-SCNC: 24 MMOL/L — SIGNIFICANT CHANGE UP (ref 22–31)
CREAT SERPL-MCNC: 0.65 MG/DL — SIGNIFICANT CHANGE UP (ref 0.5–1.3)
EGFR: 119 ML/MIN/1.73M2 — SIGNIFICANT CHANGE UP
GLUCOSE SERPL-MCNC: 108 MG/DL — HIGH (ref 70–99)
HCT VFR BLD CALC: 41.1 % — SIGNIFICANT CHANGE UP (ref 34.5–45)
HGB BLD-MCNC: 13.6 G/DL — SIGNIFICANT CHANGE UP (ref 11.5–15.5)
MCHC RBC-ENTMCNC: 32 PG — SIGNIFICANT CHANGE UP (ref 27–34)
MCHC RBC-ENTMCNC: 33.1 G/DL — SIGNIFICANT CHANGE UP (ref 32–36)
MCV RBC AUTO: 96.7 FL — SIGNIFICANT CHANGE UP (ref 80–100)
NRBC # BLD: 0 /100 WBCS — SIGNIFICANT CHANGE UP (ref 0–0)
PLATELET # BLD AUTO: 302 K/UL — SIGNIFICANT CHANGE UP (ref 150–400)
POTASSIUM SERPL-MCNC: 3.5 MMOL/L — SIGNIFICANT CHANGE UP (ref 3.5–5.3)
POTASSIUM SERPL-SCNC: 3.5 MMOL/L — SIGNIFICANT CHANGE UP (ref 3.5–5.3)
RBC # BLD: 4.25 M/UL — SIGNIFICANT CHANGE UP (ref 3.8–5.2)
RBC # FLD: 12 % — SIGNIFICANT CHANGE UP (ref 10.3–14.5)
RH IG SCN BLD-IMP: POSITIVE — SIGNIFICANT CHANGE UP
SODIUM SERPL-SCNC: 140 MMOL/L — SIGNIFICANT CHANGE UP (ref 135–145)
WBC # BLD: 7.16 K/UL — SIGNIFICANT CHANGE UP (ref 3.8–10.5)
WBC # FLD AUTO: 7.16 K/UL — SIGNIFICANT CHANGE UP (ref 3.8–10.5)

## 2024-12-10 PROCEDURE — 87077 CULTURE AEROBIC IDENTIFY: CPT

## 2024-12-10 PROCEDURE — G0463: CPT

## 2024-12-10 PROCEDURE — 52000 CYSTOURETHROSCOPY: CPT

## 2024-12-10 PROCEDURE — 86901 BLOOD TYPING SEROLOGIC RH(D): CPT

## 2024-12-10 PROCEDURE — 87086 URINE CULTURE/COLONY COUNT: CPT

## 2024-12-10 PROCEDURE — 86900 BLOOD TYPING SEROLOGIC ABO: CPT

## 2024-12-10 PROCEDURE — 80048 BASIC METABOLIC PNL TOTAL CA: CPT

## 2024-12-10 PROCEDURE — 86850 RBC ANTIBODY SCREEN: CPT

## 2024-12-10 PROCEDURE — 85027 COMPLETE CBC AUTOMATED: CPT

## 2024-12-10 RX ORDER — LIDOCAINE HYDROCHLORIDE 10 MG/ML
0.2 INJECTION INFILTRATION; PERINEURAL ONCE
Refills: 0 | Status: COMPLETED | OUTPATIENT
Start: 2024-12-31 | End: 2024-12-31

## 2024-12-10 RX ORDER — SODIUM CHLORIDE 9 MG/ML
1000 INJECTION, SOLUTION INTRAVENOUS
Refills: 0 | Status: DISCONTINUED | OUTPATIENT
Start: 2024-12-31 | End: 2024-12-31

## 2024-12-10 RX ORDER — SODIUM CHLORIDE 9 MG/ML
3 INJECTION, SOLUTION INTRAMUSCULAR; INTRAVENOUS; SUBCUTANEOUS EVERY 8 HOURS
Refills: 0 | Status: DISCONTINUED | OUTPATIENT
Start: 2024-12-31 | End: 2025-01-14

## 2024-12-10 RX ORDER — CEFAZOLIN SODIUM 1 G
2000 VIAL (EA) INJECTION ONCE
Refills: 0 | Status: COMPLETED | OUTPATIENT
Start: 2024-12-31 | End: 2024-12-31

## 2024-12-10 NOTE — H&P PST ADULT - PROBLEM SELECTOR PLAN 1
Urethral Diverticulum Repair, Urethral Reconstruction, Cystoscopy  -cbc, bmp, type and screen, urine culture at PST  -preop instructions discussed. All questions answered  -ABO and ucg on admit

## 2024-12-10 NOTE — H&P PST ADULT - HISTORY OF PRESENT ILLNESS
34 year old  (, C section) PMH of Childhood Asthma, PCOS, and Urethral Diverticulum S/P Excision with Urethral Reconstruction on 21 with recurrent Urethral Diverticulum. Patient endorses surgery was  postponed in  as she was pregnant  Reports over  the last 2 years she has had multiple UTIs, last treated in October. She reports dyspareunia and urinary dribbling. Denies fever, chills, nausea, vomiting, flank pain, gross hematuria or dysuria. Patient now presents to PST prior to scheduled Urethral Diverticulum Repair, Urethral Reconstruction, Cystoscopy on 2024.    **of note, patient reports h/o post-op hemorrhage after Excision of Urethral Diverticulum in , requiring 2 units PRBCs

## 2024-12-10 NOTE — H&P PST ADULT - NSICDXPASTSURGICALHX_GEN_ALL_CORE_FT
PAST SURGICAL HISTORY:  Fracture, metacarpal s/p repair with metal plate    H/O hernia repair umbilical hernia repair 2011 X 3    History of ovarian cyst rupture- 2011. pt states she had internal bleeding and needed a transfusion.    S/P Appendectomy     S/P cholecystectomy     S/P endoscopy     S/P urethral diverticulectomy

## 2024-12-10 NOTE — H&P PST ADULT - NSICDXPASTMEDICALHX_GEN_ALL_CORE_FT
PAST MEDICAL HISTORY:  Appendicitis     Asthma     Endometriosis     Fracture, metacarpal     History of PCOS     Obesity     Ovarian cyst     Umbilical hernia     Urethral diverticulum

## 2024-12-11 DIAGNOSIS — N36.1 URETHRAL DIVERTICULUM: ICD-10-CM

## 2024-12-12 LAB
CULTURE RESULTS: ABNORMAL
SPECIMEN SOURCE: SIGNIFICANT CHANGE UP

## 2024-12-21 ENCOUNTER — NON-APPOINTMENT (OUTPATIENT)
Age: 34
End: 2024-12-21

## 2024-12-24 PROBLEM — F10.90 ALCOHOL USE: Status: ACTIVE | Noted: 2018-10-24

## 2024-12-24 NOTE — PACU DISCHARGE NOTE - HYDRATION STATUS:
Summary of plan:  Follow-up in 6 months  Consider lab work at that time to include CMP and CBC  Continue medications as they are   Satisfactory

## 2024-12-26 ENCOUNTER — NON-APPOINTMENT (OUTPATIENT)
Age: 34
End: 2024-12-26

## 2024-12-31 ENCOUNTER — APPOINTMENT (OUTPATIENT)
Dept: UROLOGY | Facility: HOSPITAL | Age: 34
End: 2024-12-31

## 2024-12-31 ENCOUNTER — TRANSCRIPTION ENCOUNTER (OUTPATIENT)
Age: 34
End: 2024-12-31

## 2024-12-31 ENCOUNTER — OUTPATIENT (OUTPATIENT)
Dept: OUTPATIENT SERVICES | Facility: HOSPITAL | Age: 34
LOS: 1 days | End: 2024-12-31
Payer: COMMERCIAL

## 2024-12-31 VITALS
TEMPERATURE: 98 F | RESPIRATION RATE: 16 BRPM | DIASTOLIC BLOOD PRESSURE: 80 MMHG | OXYGEN SATURATION: 99 % | HEART RATE: 103 BPM | WEIGHT: 171.52 LBS | SYSTOLIC BLOOD PRESSURE: 122 MMHG | HEIGHT: 59 IN

## 2024-12-31 DIAGNOSIS — S62.309A UNSPECIFIED FRACTURE OF UNSPECIFIED METACARPAL BONE, INITIAL ENCOUNTER FOR CLOSED FRACTURE: Chronic | ICD-10-CM

## 2024-12-31 DIAGNOSIS — Z87.42 PERSONAL HISTORY OF OTHER DISEASES OF THE FEMALE GENITAL TRACT: Chronic | ICD-10-CM

## 2024-12-31 DIAGNOSIS — Z98.89 OTHER SPECIFIED POSTPROCEDURAL STATES: Chronic | ICD-10-CM

## 2024-12-31 DIAGNOSIS — N36.1 URETHRAL DIVERTICULUM: ICD-10-CM

## 2024-12-31 DIAGNOSIS — Z98.890 OTHER SPECIFIED POSTPROCEDURAL STATES: Chronic | ICD-10-CM

## 2024-12-31 LAB
ANION GAP SERPL CALC-SCNC: 11 MMOL/L — SIGNIFICANT CHANGE UP (ref 5–17)
BUN SERPL-MCNC: 8 MG/DL — SIGNIFICANT CHANGE UP (ref 7–23)
CALCIUM SERPL-MCNC: 8.5 MG/DL — SIGNIFICANT CHANGE UP (ref 8.4–10.5)
CHLORIDE SERPL-SCNC: 103 MMOL/L — SIGNIFICANT CHANGE UP (ref 96–108)
CO2 SERPL-SCNC: 21 MMOL/L — LOW (ref 22–31)
CREAT SERPL-MCNC: 0.59 MG/DL — SIGNIFICANT CHANGE UP (ref 0.5–1.3)
EGFR: 122 ML/MIN/1.73M2 — SIGNIFICANT CHANGE UP
GLUCOSE SERPL-MCNC: 168 MG/DL — HIGH (ref 70–99)
HCT VFR BLD CALC: 35.2 % — SIGNIFICANT CHANGE UP (ref 34.5–45)
HGB BLD-MCNC: 11.6 G/DL — SIGNIFICANT CHANGE UP (ref 11.5–15.5)
MCHC RBC-ENTMCNC: 32 PG — SIGNIFICANT CHANGE UP (ref 27–34)
MCHC RBC-ENTMCNC: 33 G/DL — SIGNIFICANT CHANGE UP (ref 32–36)
MCV RBC AUTO: 97.2 FL — SIGNIFICANT CHANGE UP (ref 80–100)
NRBC # BLD: 0 /100 WBCS — SIGNIFICANT CHANGE UP (ref 0–0)
PLATELET # BLD AUTO: 275 K/UL — SIGNIFICANT CHANGE UP (ref 150–400)
POTASSIUM SERPL-MCNC: 4.3 MMOL/L — SIGNIFICANT CHANGE UP (ref 3.5–5.3)
POTASSIUM SERPL-SCNC: 4.3 MMOL/L — SIGNIFICANT CHANGE UP (ref 3.5–5.3)
RBC # BLD: 3.62 M/UL — LOW (ref 3.8–5.2)
RBC # FLD: 12.1 % — SIGNIFICANT CHANGE UP (ref 10.3–14.5)
SODIUM SERPL-SCNC: 135 MMOL/L — SIGNIFICANT CHANGE UP (ref 135–145)
WBC # BLD: 15.84 K/UL — HIGH (ref 3.8–10.5)
WBC # FLD AUTO: 15.84 K/UL — HIGH (ref 3.8–10.5)

## 2024-12-31 PROCEDURE — 53230 REMOVAL OF URETHRA LESION: CPT

## 2024-12-31 PROCEDURE — 86900 BLOOD TYPING SEROLOGIC ABO: CPT

## 2024-12-31 PROCEDURE — 56740 EXC BARTHOLINS GLAND/CYST: CPT | Mod: LT

## 2024-12-31 PROCEDURE — 86850 RBC ANTIBODY SCREEN: CPT

## 2024-12-31 PROCEDURE — C1889: CPT

## 2024-12-31 PROCEDURE — 56740 EXC BARTHOLINS GLAND/CYST: CPT

## 2024-12-31 PROCEDURE — 80048 BASIC METABOLIC PNL TOTAL CA: CPT

## 2024-12-31 PROCEDURE — 86901 BLOOD TYPING SEROLOGIC RH(D): CPT

## 2024-12-31 PROCEDURE — 85027 COMPLETE CBC AUTOMATED: CPT

## 2024-12-31 DEVICE — SURGIFLO MATRIX WITH THROMBIN KIT: Type: IMPLANTABLE DEVICE | Status: FUNCTIONAL

## 2024-12-31 RX ORDER — FENTANYL 75 UG/H
25 PATCH, EXTENDED RELEASE TRANSDERMAL
Refills: 0 | Status: DISCONTINUED | OUTPATIENT
Start: 2024-12-31 | End: 2025-01-01

## 2024-12-31 RX ORDER — POLYETHYLENE GLYCOL 3350 17 G/DOSE
17 POWDER (GRAM) ORAL
Qty: 1 | Refills: 0
Start: 2024-12-31 | End: 2025-01-29

## 2024-12-31 RX ORDER — ACETAMINOPHEN 80 MG/.8ML
1000 SOLUTION/ DROPS ORAL ONCE
Refills: 0 | Status: COMPLETED | OUTPATIENT
Start: 2024-12-31 | End: 2024-12-31

## 2024-12-31 RX ORDER — HYDROMORPHONE HCL 4 MG
0.5 TABLET ORAL
Refills: 0 | Status: DISCONTINUED | OUTPATIENT
Start: 2024-12-31 | End: 2025-01-01

## 2024-12-31 RX ORDER — ONDANSETRON 4 MG/1
4 TABLET ORAL ONCE
Refills: 0 | Status: DISCONTINUED | OUTPATIENT
Start: 2024-12-31 | End: 2025-01-01

## 2024-12-31 RX ORDER — OXYCODONE HCL 15 MG
5 TABLET ORAL ONCE
Refills: 0 | Status: DISCONTINUED | OUTPATIENT
Start: 2024-12-31 | End: 2025-01-01

## 2024-12-31 RX ORDER — SODIUM CHLORIDE 9 MG/ML
1000 INJECTION, SOLUTION INTRAVENOUS
Refills: 0 | Status: DISCONTINUED | OUTPATIENT
Start: 2024-12-31 | End: 2025-01-14

## 2024-12-31 RX ORDER — APREPITANT 40 MG/1
40 CAPSULE ORAL ONCE
Refills: 0 | Status: COMPLETED | OUTPATIENT
Start: 2024-12-31 | End: 2024-12-31

## 2024-12-31 RX ORDER — OXYCODONE HCL 15 MG
10 TABLET ORAL ONCE
Refills: 0 | Status: DISCONTINUED | OUTPATIENT
Start: 2024-12-31 | End: 2025-01-01

## 2024-12-31 RX ORDER — OXYCODONE HCL 15 MG
1 TABLET ORAL
Qty: 12 | Refills: 0
Start: 2024-12-31 | End: 2025-01-02

## 2024-12-31 RX ADMIN — SODIUM CHLORIDE 75 MILLILITER(S): 9 INJECTION, SOLUTION INTRAVENOUS at 22:32

## 2024-12-31 RX ADMIN — SODIUM CHLORIDE 3 MILLILITER(S): 9 INJECTION, SOLUTION INTRAMUSCULAR; INTRAVENOUS; SUBCUTANEOUS at 11:07

## 2024-12-31 RX ADMIN — SODIUM CHLORIDE 100 MILLILITER(S): 9 INJECTION, SOLUTION INTRAVENOUS at 11:05

## 2024-12-31 RX ADMIN — ACETAMINOPHEN 400 MILLIGRAM(S): 80 SOLUTION/ DROPS ORAL at 20:42

## 2024-12-31 RX ADMIN — APREPITANT 40 MILLIGRAM(S): 40 CAPSULE ORAL at 12:18

## 2024-12-31 RX ADMIN — SODIUM CHLORIDE 3 MILLILITER(S): 9 INJECTION, SOLUTION INTRAMUSCULAR; INTRAVENOUS; SUBCUTANEOUS at 22:30

## 2024-12-31 NOTE — ASU DISCHARGE PLAN (ADULT/PEDIATRIC) - NS MD DC FALL RISK RISK
For information on Fall & Injury Prevention, visit: [email or web address] OR  [email or web address] OR  https://www.cdc.gov/steadi/patient.html

## 2024-12-31 NOTE — ASU PATIENT PROFILE, ADULT - FALL HARM RISK - UNIVERSAL INTERVENTIONS
Bed in lowest position, wheels locked, appropriate side rails in place/Call bell, personal items and telephone in reach/Instruct patient to call for assistance before getting out of bed or chair/Non-slip footwear when patient is out of bed/[place] to call system/Physically safe environment - no spills, clutter or unnecessary equipment/Purposeful Proactive Rounding/Room/bathroom lighting operational, light cord in reach

## 2024-12-31 NOTE — ASU DISCHARGE PLAN (ADULT/PEDIATRIC) - FINANCIAL ASSISTANCE
[facility] provides services at a reduced cost to those who are determined to be eligible through [facility]’s financial assistance program. Information regarding [facility]’s financial assistance program can be found by going to [email or web address] or by calling [phone].

## 2024-12-31 NOTE — ASU DISCHARGE PLAN (ADULT/PEDIATRIC) - NURSING INSTRUCTIONS
Next dose of Tylenol will be on or after 07:10 pm, tonight, If needed for pain. Your first dose of Tylenol was given at 01:10 pm. Do not exceed more than 4000mg of Tylenol in one 24 hour setting.

## 2024-12-31 NOTE — ASU DISCHARGE PLAN (ADULT/PEDIATRIC) - ASU DC SPECIAL INSTRUCTIONSFT
Most patients are sent home with a catheter, You will have a follow up visit with Dr. Lopez to perform a voiding cystourethrogram (picture of the urethra while voiding).    It is common to have blood in the urine after your procedure.  It may be pink or even red; inform your doctor if you have a significant amount of clots in the urine or if you are unable to void at all.  Keep yourself well hydrated at all times.    -Your incision has already started the healing process.  If you notice excessive drainage of liquid or blood from your incision, inform your doctor.  The same applies to the site where your suprapubic tube is, once it is removed by your doctor.  -Drink at least 6-8 glasses of fluid per day; minimize night-time drinking if this causes you to awaken regularly to urinate  -You may resume your regular diet and regular medication regimen.    -You may shower.  You will see some bleeding from the vagina and spotting for a few weeks post operatively  -You may experience weakness after you are discharged from the hospital.  This is normal.  You will slowly regain your strength as time goes by.  -You may walk around and go up and down stairs.  -Do not exercise, lift heavy objects, return to work, or resume sexual activity until you are told to do so by your urologist.      -You will be given a prescription for pain medication; continue using this as long as your pain persists.  As soon as Extra strength Tylenol is adequate, you can switch to this instead – it is less constipating.  If you have severe pain that does not improve with the pain medication, call your doctor.  You do not need antibiotics.   -You have a prescription for miralax  -Call your physician if you have a fever over 101F.  -Call your physician if your suprapubic tube is accidentally pulled out.  -Make a follow up appointment with your urologist when you arrive home (or the next business day).  -Call your urologist during normal business hours with any other routine questions. Most patients are sent home with a catheter, You will have a follow up visit with Dr. Lopez to perform a voiding cystourethrogram (picture of the urethra while voiding).    ********************************************************************************************   It is common to have blood in the urine after your procedure.  It may be pink or even red; inform your doctor if you have a significant amount of clots in the urine or if you are unable to void at all.  Keep yourself well hydrated at all times.    ********************************************************************************************   -Your incision has already started the healing process.  If you notice excessive drainage of liquid or blood from your incision, inform your doctor.  The same applies to the site where your suprapubic tube is, once it is removed by your doctor.    ********************************************************************************************   -Drink at least 6-8 glasses of fluid per day; minimize night-time drinking if this causes you to awaken regularly to urinate    ********************************************************************************************   -You may resume your regular diet and regular medication regimen.      ********************************************************************************************   -You may shower.  You will see some bleeding from the vagina and spotting for a few weeks post operatively    ********************************************************************************************   -You may experience weakness after you are discharged from the hospital.  This is normal.  You will slowly regain your strength as time goes by.    ********************************************************************************************   -You may walk around and go up and down stairs.    ********************************************************************************************   -Do not exercise, lift heavy objects, return to work, or resume sexual activity until you are told to do so by your urologist.        ********************************************************************************************   -You will be given a prescription for pain medication; continue using this as long as your pain persists.  As soon as Extra strength Tylenol is adequate, you can switch to this instead – it is less constipating.  If you have severe pain that does not improve with the pain medication, call your doctor.    ********************************************************************************************   -You do not need antibiotics.    ********************************************************************************************      -You have a prescription for miralax    ********************************************************************************************   -Call your physician if you have a fever over 101F.    ********************************************************************************************   -Call your physician if your suprapubic tube is accidentally pulled out.    ********************************************************************************************   -Make a follow up appointment with your urologist when you arrive home (or the next business day).    ********************************************************************************************   -Call your urologist during normal business hours with any other routine questions.

## 2024-12-31 NOTE — ASU DISCHARGE PLAN (ADULT/PEDIATRIC) - CARE PROVIDER_API CALL
Nitin Lopze  Urology  [address] [place] [id number]  Phone: [phone]  Fax: [phone]  Follow Up Time: 2 weeks

## 2024-12-31 NOTE — PRE-ANESTHESIA EVALUATION ADULT - NSANTHPMHFT_GEN_ALL_CORE
34 year old  (, C section) PMHx of Childhood Asthma, PCOS, and Urethral Diverticulum S/P Excision with Urethral Reconstruction on 21 with recurrent Urethral Diverticulum. Patient endorses surgery was  postponed in  as she was pregnant  Reports over  the last 2 years she has had multiple UTIs, last treated in October. She reports dyspareunia and urinary dribbling. She now presents for Urethral Diverticulum Repair, Urethral Reconstruction, Cystoscopy.    Denies active CP/SOB/Orthopnea  Denies hx of MI/CHF

## 2025-01-01 VITALS
RESPIRATION RATE: 17 BRPM | HEART RATE: 70 BPM | TEMPERATURE: 98 F | DIASTOLIC BLOOD PRESSURE: 60 MMHG | SYSTOLIC BLOOD PRESSURE: 99 MMHG | OXYGEN SATURATION: 99 %

## 2025-01-01 NOTE — PACU DISCHARGE NOTE - COMMENTS
no anesthesia complications
At surgeon's request, patient being transferred to main hospital PACU for overnight observation

## 2025-01-01 NOTE — PROGRESS NOTE ADULT - SUBJECTIVE AND OBJECTIVE BOX
Urology Progress Note     Subjective/24hour Events:   Patient seen and examined.   No acute events overnight. Doing well. ready for discharge. Orthostatic vitals were normal. patient feeling better and amenable to discharge. Minimal bleeding into pad, urine clear yellow and gaytan in place  Pain controlled.     Vital Signs:  Vital Signs Last 24 Hrs  T(C): 36.5 (01 Jan 2025 11:00), Max: 37 (31 Dec 2024 21:46)  T(F): 97.7 (01 Jan 2025 11:00), Max: 98.6 (31 Dec 2024 21:46)  HR: 70 (01 Jan 2025 11:00) (63 - 113)  BP: 99/60 (01 Jan 2025 11:00) (92/55 - 133/66)  BP(mean): 72 (01 Jan 2025 11:00) (67 - 93)  RR: 17 (01 Jan 2025 11:00) (14 - 17)  SpO2: 99% (01 Jan 2025 11:00) (95% - 99%)    Parameters below as of 01 Jan 2025 11:00  Patient On (Oxygen Delivery Method): room air        CAPILLARY BLOOD GLUCOSE          I&O's Detail    31 Dec 2024 07:01  -  01 Jan 2025 07:00  --------------------------------------------------------  IN:    Lactated Ringers: 500 mL    Lactated Ringers: 675 mL    Oral Fluid: 625 mL  Total IN: 1800 mL    OUT:    Indwelling Catheter - Urethral (mL): 1560 mL  Total OUT: 1560 mL    Total NET: 240 mL      01 Jan 2025 07:01  -  01 Jan 2025 12:21  --------------------------------------------------------  IN:    Lactated Ringers: 75 mL  Total IN: 75 mL    OUT:    Indwelling Catheter - Urethral (mL): 220 mL  Total OUT: 220 mL    Total NET: -145 mL          MEDICATIONS  (STANDING):  lactated ringers. 1000 milliLiter(s) (75 mL/Hr) IV Continuous <Continuous>  sodium chloride 0.9% lock flush 3 milliLiter(s) IV Push every 8 hours    MEDICATIONS  (PRN):  fentaNYL    Injectable 25 MICROGram(s) IV Push every 5 minutes PRN Moderate Pain (4 - 6)  HYDROmorphone  Injectable 0.5 milliGRAM(s) IV Push every 10 minutes PRN Severe Pain (7 - 10)  ondansetron Injectable 4 milliGRAM(s) IV Push once PRN Nausea and/or Vomiting  oxyCODONE    IR 5 milliGRAM(s) Oral once PRN Moderate Pain (4 - 6)  oxyCODONE    IR 10 milliGRAM(s) Oral once PRN Severe Pain (7 - 10)      Physical Exam:  Gen: NAD.  Lungs: Non labored breathing.   Ab: Soft, nontender, nondistended.   : Gaytan in place draining clear yellow urine.     Labs:    12-31    135  |  103  |  8   ----------------------------<  168[H]  4.3   |  21[L]  |  0.59    Ca    8.5      31 Dec 2024 18:57                              11.6   15.84 )-----------( 275      ( 31 Dec 2024 18:57 )             35.2

## 2025-01-01 NOTE — PROGRESS NOTE ADULT - ASSESSMENT
35 yo F sp urethral diverticulectomy 12/31,     Plan  - Post op labs reviewed  - No concern for bleeding given dry vaginal pad and clear yellow urine  - All vitals stable  - Dc home  - Miralax, PRN pain medication on discharge  - F/u 2 weeks for VCUG in office with Dr. Lopez.     [facility]  [address] [place] [id number] [phone]

## 2025-01-03 PROBLEM — S62.309A UNSPECIFIED FRACTURE OF UNSPECIFIED METACARPAL BONE, INITIAL ENCOUNTER FOR CLOSED FRACTURE: Chronic | Status: ACTIVE | Noted: 2024-12-10

## 2025-01-03 PROBLEM — N36.1 URETHRAL DIVERTICULUM: Chronic | Status: ACTIVE | Noted: 2024-12-10

## 2025-01-06 PROBLEM — N32.89 BLADDER SPASM: Status: ACTIVE | Noted: 2025-01-06

## 2025-01-06 RX ORDER — TROSPIUM CHLORIDE 20 MG/1
20 TABLET, FILM COATED ORAL
Qty: 30 | Refills: 0 | Status: ACTIVE | COMMUNITY
Start: 2025-01-06 | End: 1900-01-01

## 2025-01-07 NOTE — ED ADULT NURSE NOTE - NS ED NOTE ABUSE RESPONSE YN
Received report from Vasyl, who brought patient to the floor with Tamy Zelaya. Pt /71 HR 84 Temp 96.5 Pt received 2mg Ativan IM before arrival while being admitted at intake.  Pt difficult to understand in normal conversation due to speech impediment This is more difficult when patient has been participating in substance abuse. According to patient this consumption increased when he was kicked out of brothers home. He stated increased suicidal ideations began in which he considered slicing his own throat, Pt presents with pressured speech rambling at times. He was calm and cooperative stated he  was hearing voices while seeing things trying to attack him.    Yes

## 2025-01-13 ENCOUNTER — NON-APPOINTMENT (OUTPATIENT)
Age: 35
End: 2025-01-13

## 2025-01-13 RX ORDER — SULFAMETHOXAZOLE AND TRIMETHOPRIM 800; 160 MG/1; MG/1
800-160 TABLET ORAL TWICE DAILY
Qty: 10 | Refills: 0 | Status: ACTIVE | COMMUNITY
Start: 2025-01-13 | End: 1900-01-01

## 2025-01-16 ENCOUNTER — OUTPATIENT (OUTPATIENT)
Dept: OUTPATIENT SERVICES | Facility: HOSPITAL | Age: 35
LOS: 1 days | End: 2025-01-16
Payer: COMMERCIAL

## 2025-01-16 ENCOUNTER — APPOINTMENT (OUTPATIENT)
Dept: UROLOGY | Facility: CLINIC | Age: 35
End: 2025-01-16
Payer: COMMERCIAL

## 2025-01-16 VITALS
TEMPERATURE: 100.2 F | HEART RATE: 103 BPM | SYSTOLIC BLOOD PRESSURE: 114 MMHG | DIASTOLIC BLOOD PRESSURE: 76 MMHG | RESPIRATION RATE: 20 BRPM

## 2025-01-16 DIAGNOSIS — Z98.890 OTHER SPECIFIED POSTPROCEDURAL STATES: Chronic | ICD-10-CM

## 2025-01-16 DIAGNOSIS — N36.1 URETHRAL DIVERTICULUM: ICD-10-CM

## 2025-01-16 DIAGNOSIS — N32.89 OTHER SPECIFIED DISORDERS OF BLADDER: ICD-10-CM

## 2025-01-16 DIAGNOSIS — R35.0 FREQUENCY OF MICTURITION: ICD-10-CM

## 2025-01-16 DIAGNOSIS — Z98.89 OTHER SPECIFIED POSTPROCEDURAL STATES: Chronic | ICD-10-CM

## 2025-01-16 DIAGNOSIS — S62.309A UNSPECIFIED FRACTURE OF UNSPECIFIED METACARPAL BONE, INITIAL ENCOUNTER FOR CLOSED FRACTURE: Chronic | ICD-10-CM

## 2025-01-16 DIAGNOSIS — N39.0 URINARY TRACT INFECTION, SITE NOT SPECIFIED: ICD-10-CM

## 2025-01-16 DIAGNOSIS — Z87.42 PERSONAL HISTORY OF OTHER DISEASES OF THE FEMALE GENITAL TRACT: Chronic | ICD-10-CM

## 2025-01-16 PROCEDURE — 74455 X-RAY URETHRA/BLADDER: CPT

## 2025-01-16 PROCEDURE — 51600 INJECTION FOR BLADDER X-RAY: CPT

## 2025-01-16 PROCEDURE — 74455 X-RAY URETHRA/BLADDER: CPT | Mod: 26,58

## 2025-01-17 DIAGNOSIS — N32.89 OTHER SPECIFIED DISORDERS OF BLADDER: ICD-10-CM

## 2025-01-17 DIAGNOSIS — N36.1 URETHRAL DIVERTICULUM: ICD-10-CM

## 2025-01-17 DIAGNOSIS — N39.0 URINARY TRACT INFECTION, SITE NOT SPECIFIED: ICD-10-CM

## 2025-01-21 LAB — BACTERIA UR CULT: NORMAL

## 2025-01-22 ENCOUNTER — NON-APPOINTMENT (OUTPATIENT)
Age: 35
End: 2025-01-22

## 2025-01-22 RX ORDER — FLUCONAZOLE 150 MG/1
150 TABLET ORAL
Qty: 1 | Refills: 0 | Status: ACTIVE | COMMUNITY
Start: 2025-01-22 | End: 1900-01-01

## 2025-01-22 RX ORDER — NITROFURANTOIN (MONOHYDRATE/MACROCRYSTALS) 25; 75 MG/1; MG/1
100 CAPSULE ORAL
Qty: 14 | Refills: 0 | Status: ACTIVE | COMMUNITY
Start: 2025-01-22 | End: 1900-01-01

## 2025-01-22 NOTE — ED PROVIDER NOTE - ENMT, MLM
Airway patent, Nasal mucosa clear. Mouth with normal mucosa. Throat has no vesicles, no oropharyngeal exudates and uvula is midline. 22-Jan-2025 05:18

## 2025-01-23 LAB
APPEARANCE: CLEAR
BACTERIA: NEGATIVE /HPF
BILIRUBIN URINE: NEGATIVE
BLOOD URINE: ABNORMAL
CAST: 1 /LPF
COLOR: YELLOW
EPITHELIAL CELLS: 12 /HPF
GLUCOSE QUALITATIVE U: NEGATIVE MG/DL
KETONES URINE: NEGATIVE MG/DL
LEUKOCYTE ESTERASE URINE: ABNORMAL
MICROSCOPIC-UA: NORMAL
NITRITE URINE: NEGATIVE
PH URINE: 5
PROTEIN URINE: NORMAL MG/DL
RED BLOOD CELLS URINE: 5 /HPF
SPECIFIC GRAVITY URINE: 1.03
UROBILINOGEN URINE: 0.2 MG/DL
WHITE BLOOD CELLS URINE: 58 /HPF

## 2025-01-23 NOTE — ED ADULT NURSE NOTE - NS ED PATIENT SAFETY CONCERN
-Hx of chronic DLES on Cefadroxil for suppression  -cont current reg of cefazolin IV, need abx x 6wk  - orders placed   No

## 2025-01-24 LAB — BACTERIA UR CULT: NORMAL

## 2025-01-27 ENCOUNTER — NON-APPOINTMENT (OUTPATIENT)
Age: 35
End: 2025-01-27

## 2025-01-27 DIAGNOSIS — R30.0 DYSURIA: ICD-10-CM

## 2025-01-27 RX ORDER — CELECOXIB 100 MG/1
100 CAPSULE ORAL TWICE DAILY
Qty: 14 | Refills: 0 | Status: ACTIVE | COMMUNITY
Start: 2025-01-27 | End: 1900-01-01

## 2025-02-10 ENCOUNTER — APPOINTMENT (OUTPATIENT)
Dept: UROLOGY | Facility: CLINIC | Age: 35
End: 2025-02-10
Payer: COMMERCIAL

## 2025-02-10 VITALS
BODY MASS INDEX: 33.67 KG/M2 | WEIGHT: 167 LBS | OXYGEN SATURATION: 95 % | HEIGHT: 59 IN | HEART RATE: 72 BPM | SYSTOLIC BLOOD PRESSURE: 130 MMHG | DIASTOLIC BLOOD PRESSURE: 75 MMHG

## 2025-02-10 PROCEDURE — 99024 POSTOP FOLLOW-UP VISIT: CPT

## 2025-02-21 NOTE — H&P PST ADULT - MEDICATION HERBAL REMEDIES, PROFILE
levothyroxine 175 mcg (0.175 mg) oral tablet: 1 tab(s) orally once a day (in the morning)  magnesium: 400 mg orally once a day (at bedtime)  metoprolol tartrate 25 mg oral tablet: 1 tab(s) orally 2 times a day  olmesartan 20 mg oral tablet: 1 tab(s) orally once a day (in the morning)  pantoprazole 40 mg oral delayed release tablet: 1 tab(s) orally once a day (in the morning)  ranolazine 500 mg oral tablet, extended release: 1 tab(s) orally once a day (in the morning)  tamoxifen 20 mg oral tablet: 1 tab(s) orally once a day LD 1/23/2025  triamterene-hydrochlorothiazide 37.5 mg-25 mg oral tablet: 1 tab(s) orally once a day (in the morning)  Vitamin D3: 50,000 int unit orally once a week THURS  Xarelto 10 mg oral tablet: 1 tab(s) orally once a day LD 1/23/2025   codeine-acetaminophen 30 mg-300 mg oral tablet: 1 tab(s) orally every 4 hours As needed moderate or severe pain  levothyroxine 175 mcg (0.175 mg) oral tablet: 1 tab(s) orally once a day (in the morning)  magnesium: 400 mg orally once a day (at bedtime)  metoprolol tartrate 25 mg oral tablet: 1 tab(s) orally 2 times a day  olmesartan 20 mg oral tablet: 1 tab(s) orally once a day (in the morning)  pantoprazole 40 mg oral delayed release tablet: 1 tab(s) orally once a day (in the morning)  polyethylene glycol 3350 oral powder for reconstitution: 17 gram(s) orally once a day (at bedtime)  ranolazine 500 mg oral tablet, extended release: 1 tab(s) orally once a day (in the morning)  senna leaf extract oral tablet: 2 tab(s) orally once a day (at bedtime)  tamoxifen 20 mg oral tablet: 1 tab(s) orally once a day LD 1/23/2025  triamterene-hydrochlorothiazide 37.5 mg-25 mg oral tablet: 1 tab(s) orally once a day (in the morning)  Vitamin D3: 50,000 int unit orally once a week THURS  Xarelto 10 mg oral tablet: 1 tab(s) orally once a day LD 1/23/2025   no

## 2025-03-13 ENCOUNTER — APPOINTMENT (OUTPATIENT)
Dept: UROLOGY | Facility: CLINIC | Age: 35
End: 2025-03-13

## 2025-03-13 ENCOUNTER — APPOINTMENT (OUTPATIENT)
Dept: UROLOGY | Facility: CLINIC | Age: 35
End: 2025-03-13
Payer: COMMERCIAL

## 2025-03-13 VITALS
RESPIRATION RATE: 18 BRPM | HEART RATE: 86 BPM | DIASTOLIC BLOOD PRESSURE: 76 MMHG | SYSTOLIC BLOOD PRESSURE: 119 MMHG | HEIGHT: 59 IN | BODY MASS INDEX: 33.67 KG/M2 | OXYGEN SATURATION: 99 % | WEIGHT: 167 LBS

## 2025-03-13 DIAGNOSIS — N39.0 URINARY TRACT INFECTION, SITE NOT SPECIFIED: ICD-10-CM

## 2025-03-13 DIAGNOSIS — Z01.818 ENCOUNTER FOR OTHER PREPROCEDURAL EXAMINATION: ICD-10-CM

## 2025-03-13 DIAGNOSIS — M79.641 PAIN IN RIGHT HAND: ICD-10-CM

## 2025-03-13 DIAGNOSIS — N36.1 URETHRAL DIVERTICULUM: ICD-10-CM

## 2025-03-13 PROCEDURE — 51798 US URINE CAPACITY MEASURE: CPT

## 2025-03-13 PROCEDURE — G2211 COMPLEX E/M VISIT ADD ON: CPT

## 2025-03-13 PROCEDURE — 99213 OFFICE O/P EST LOW 20 MIN: CPT | Mod: 24

## 2025-03-17 LAB — BACTERIA UR CULT: NORMAL

## 2025-04-03 NOTE — H&P PST ADULT - RESPIRATORY
Aim for 150 minutes of aerobic exercise weekly.  Yearly mammogram recommended. As we discussed breast cancer occurs in 1 in 8 women and mammogram is recommended for screening. Delaying mammogram could result in worse prognosis. If you change your mind and would like to schedule let us know.   Colon cancer screening is recommended starting at age 45.   You had lab tests today. You should receive a call or my chart message with your test results. If you have not received your results in the next 7-10 days, please contact the office.        
clear to auscultation bilaterally/no wheezes/no respiratory distress/no use of accessory muscles/breath sounds equal/good air movement

## 2025-06-08 ENCOUNTER — NON-APPOINTMENT (OUTPATIENT)
Age: 35
End: 2025-06-08

## 2025-06-14 ENCOUNTER — NON-APPOINTMENT (OUTPATIENT)
Age: 35
End: 2025-06-14

## 2025-06-16 ENCOUNTER — APPOINTMENT (OUTPATIENT)
Dept: UROLOGY | Facility: CLINIC | Age: 35
End: 2025-06-16
Payer: COMMERCIAL

## 2025-06-16 VITALS
HEART RATE: 73 BPM | OXYGEN SATURATION: 96 % | RESPIRATION RATE: 18 BRPM | TEMPERATURE: 98.3 F | HEIGHT: 59 IN | SYSTOLIC BLOOD PRESSURE: 124 MMHG | WEIGHT: 167 LBS | BODY MASS INDEX: 33.67 KG/M2 | DIASTOLIC BLOOD PRESSURE: 82 MMHG

## 2025-06-16 PROCEDURE — 99213 OFFICE O/P EST LOW 20 MIN: CPT

## 2025-06-16 PROCEDURE — 99459 PELVIC EXAMINATION: CPT

## 2025-06-16 PROCEDURE — G2211 COMPLEX E/M VISIT ADD ON: CPT

## 2025-06-29 ENCOUNTER — NON-APPOINTMENT (OUTPATIENT)
Age: 35
End: 2025-06-29

## 2025-08-10 ENCOUNTER — NON-APPOINTMENT (OUTPATIENT)
Age: 35
End: 2025-08-10

## 2025-08-14 ENCOUNTER — APPOINTMENT (OUTPATIENT)
Dept: UROLOGY | Facility: CLINIC | Age: 35
End: 2025-08-14

## (undated) DEVICE — SUCTION YANKAUER NO CONTROL VENT

## (undated) DEVICE — SOL IRR POUR H2O 250ML

## (undated) DEVICE — FOLEY TRAY 16FR 5CC LTX UMETER CLOSED

## (undated) DEVICE — NDL HYPO REGULAR BEVEL 25G X 1.5" (BLUE)

## (undated) DEVICE — SUT POLYSORB 2-0 30" V-20 UNDYED

## (undated) DEVICE — DRAPE INSTRUMENT POUCH 6.75" X 11"

## (undated) DEVICE — SENSOR O2 FINGER ADULT

## (undated) DEVICE — SUT SOFSILK 3-0 30" V-20

## (undated) DEVICE — SOL INJ NS 0.9% 500ML 2 PORT

## (undated) DEVICE — VENODYNE/SCD SLEEVE CALF LARGE

## (undated) DEVICE — PREP BETADINE KIT

## (undated) DEVICE — TUBING IV SET GRAVITY 3Y 100" MACRO

## (undated) DEVICE — SYR ALLIANCE II INFLATION 60ML

## (undated) DEVICE — TUBING SUCTION 20FT

## (undated) DEVICE — CATH IV SAFE BC 20G X 1.16" (PINK)

## (undated) DEVICE — PACK IV START WITH CHG

## (undated) DEVICE — SOL IRR BAG NS 0.9% 3000ML

## (undated) DEVICE — DRAPE LINGEMAN TUR

## (undated) DEVICE — DRAPE MAYO STAND 23"

## (undated) DEVICE — FORCEP RADIAL JAW 4 JUMBO 2.8MM 3.2MM 240CM ORANGE DISP

## (undated) DEVICE — BRUSH COLONOSCOPY CYTOLOGY

## (undated) DEVICE — VISITEC 4X4

## (undated) DEVICE — SUT POLYSORB 3-0 30" V-20 UNDYED

## (undated) DEVICE — ELCTR BOVIE PENCIL SMOKE EVACUATION

## (undated) DEVICE — TUBING SUCTION CONN 6FT STERILE

## (undated) DEVICE — MEDICATION LABELS W MARKER

## (undated) DEVICE — DRAPE TOWEL BLUE 17" X 24"

## (undated) DEVICE — FOLEY HOLDER STATLOCK 2 WAY ADULT

## (undated) DEVICE — DRSG KLING 4"

## (undated) DEVICE — LONE STAR ELASTIC STAY HOOK 5MM SHARP

## (undated) DEVICE — SUT SOFSILK 2-0 30" V-20

## (undated) DEVICE — POSITIONER FOAM EGG CRATE ULNAR 2PCS (PINK)

## (undated) DEVICE — BALLOON US ENDO

## (undated) DEVICE — WARMING BLANKET UPPER ADULT

## (undated) DEVICE — CATH IV SAFE BC 22G X 1" (BLUE)

## (undated) DEVICE — LONE STAR RETRACTOR RING 32.5CM X 18.3CM DISP

## (undated) DEVICE — SUT POLYSORB 4-0 30" V-20 UNDYED

## (undated) DEVICE — GLV 7 PROTEXIS (WHITE)

## (undated) DEVICE — BLADE SCALPEL SAFETYLOCK #15

## (undated) DEVICE — LAP PAD 18 X 18"

## (undated) DEVICE — BAG DECANTER IV STERILE

## (undated) DEVICE — SYR LUER LOK 10CC

## (undated) DEVICE — NDL HYPO SAFE 22G X 1.5" (BLACK)

## (undated) DEVICE — DRAPE 1/2 SHEET 40X57"

## (undated) DEVICE — SOL INJ NS 0.9% 100ML

## (undated) DEVICE — BAG DECANTER 2

## (undated) DEVICE — BITE BLOCK ADULT 20 X 27MM (GREEN)

## (undated) DEVICE — GLV 7.5 PROTEXIS (WHITE)

## (undated) DEVICE — DRAPE LIGHT HANDLE COVER (GREEN)

## (undated) DEVICE — GOWN TRIMAX LG

## (undated) DEVICE — SOL IRR BAG H2O 3000ML

## (undated) DEVICE — BIOPSY FORCEP RADIAL JAW 4 STANDARD WITH NEEDLE

## (undated) DEVICE — IRRIGATOR BIO SHIELD

## (undated) DEVICE — SYR ASEPTO

## (undated) DEVICE — Device

## (undated) DEVICE — PACK CYSTO

## (undated) DEVICE — TUBING TUR 2 PRONG

## (undated) DEVICE — SPECIMEN CONTAINER 100ML